# Patient Record
Sex: FEMALE | Employment: FULL TIME | URBAN - METROPOLITAN AREA
[De-identification: names, ages, dates, MRNs, and addresses within clinical notes are randomized per-mention and may not be internally consistent; named-entity substitution may affect disease eponyms.]

---

## 2018-07-23 LAB
HPV_EXT - HISTORICAL: NORMAL
PAP SMEAR - HIM PATIENT REPORTED: ABNORMAL

## 2019-06-25 LAB
ABORH_EXT (HISTORICAL CONVERSION): NORMAL
ANTIBODY_EXT (HISTORICAL CONVERSION): NEGATIVE
HBSAG_EXT (HISTORICAL CONVERSION): NEGATIVE
HGB_EXT (HISTORICAL CONVERSION): 13.4
HIV_EXT: NEGATIVE
PLT_EXT - HISTORICAL: 147
RPR - HISTORICAL: NORMAL
RUBELLA_EXT (HISTORICAL CONVERSION): NORMAL

## 2019-08-05 ENCOUNTER — TRANSFERRED RECORDS (OUTPATIENT)
Dept: HEALTH INFORMATION MANAGEMENT | Facility: CLINIC | Age: 39
End: 2019-08-05

## 2019-08-19 ENCOUNTER — MEDICAL CORRESPONDENCE (OUTPATIENT)
Dept: HEALTH INFORMATION MANAGEMENT | Facility: CLINIC | Age: 39
End: 2019-08-19

## 2019-08-19 ENCOUNTER — COMMUNICATION - HEALTHEAST (OUTPATIENT)
Dept: TELEHEALTH | Facility: CLINIC | Age: 39
End: 2019-08-19

## 2019-08-19 ENCOUNTER — PRENATAL OFFICE VISIT - HEALTHEAST (OUTPATIENT)
Dept: FAMILY MEDICINE | Facility: CLINIC | Age: 39
End: 2019-08-19

## 2019-08-19 DIAGNOSIS — Z23 NEED FOR HEPATITIS B VACCINATION: ICD-10-CM

## 2019-08-19 DIAGNOSIS — L29.9 ITCHING: ICD-10-CM

## 2019-08-19 DIAGNOSIS — O99.119 THROMBOCYTOPENIA AFFECTING PREGNANCY (H): ICD-10-CM

## 2019-08-19 DIAGNOSIS — K75.4 HEPATITIS, AUTOIMMUNE (H): ICD-10-CM

## 2019-08-19 DIAGNOSIS — G43.109 MIGRAINE WITH AURA AND WITHOUT STATUS MIGRAINOSUS, NOT INTRACTABLE: ICD-10-CM

## 2019-08-19 DIAGNOSIS — D69.6 THROMBOCYTOPENIA AFFECTING PREGNANCY (H): ICD-10-CM

## 2019-08-19 DIAGNOSIS — O09.529 SUPERVISION OF HIGH-RISK PREGNANCY OF ELDERLY MULTIGRAVIDA: ICD-10-CM

## 2019-08-19 DIAGNOSIS — Z12.4 CERVICAL CANCER SCREENING: ICD-10-CM

## 2019-08-19 LAB
ALBUMIN SERPL-MCNC: 3 G/DL (ref 3.5–5)
ALP SERPL-CCNC: 162 U/L (ref 45–120)
ALT SERPL W P-5'-P-CCNC: 456 U/L (ref 0–45)
AST SERPL W P-5'-P-CCNC: 448 U/L (ref 0–40)
BILIRUB DIRECT SERPL-MCNC: 0.5 MG/DL
BILIRUB SERPL-MCNC: 1 MG/DL (ref 0–1)
PROT SERPL-MCNC: 9.1 G/DL (ref 6–8)

## 2019-08-19 ASSESSMENT — MIFFLIN-ST. JEOR: SCORE: 1209.75

## 2019-08-20 LAB — BILE AC SERPL-SCNC: 73 UMOL/L (ref 0–10)

## 2019-08-21 ENCOUNTER — COMMUNICATION - HEALTHEAST (OUTPATIENT)
Dept: FAMILY MEDICINE | Facility: CLINIC | Age: 39
End: 2019-08-21

## 2019-08-21 ENCOUNTER — COMMUNICATION - HEALTHEAST (OUTPATIENT)
Dept: MATERNAL FETAL MEDICINE | Facility: HOSPITAL | Age: 39
End: 2019-08-21

## 2019-08-21 ENCOUNTER — TRANSCRIBE ORDERS (OUTPATIENT)
Dept: FAMILY MEDICINE | Facility: CLINIC | Age: 39
End: 2019-08-21

## 2019-08-21 DIAGNOSIS — O26.90 PREGNANCY RELATED CONDITION, ANTEPARTUM: Primary | ICD-10-CM

## 2019-08-23 ENCOUNTER — PRE VISIT (OUTPATIENT)
Dept: MATERNAL FETAL MEDICINE | Facility: CLINIC | Age: 39
End: 2019-08-23

## 2019-09-03 ENCOUNTER — OFFICE VISIT (OUTPATIENT)
Dept: MATERNAL FETAL MEDICINE | Facility: CLINIC | Age: 39
End: 2019-09-03
Attending: OBSTETRICS & GYNECOLOGY
Payer: COMMERCIAL

## 2019-09-03 DIAGNOSIS — O26.90 PREGNANCY RELATED CONDITION, ANTEPARTUM: Primary | ICD-10-CM

## 2019-09-03 DIAGNOSIS — M35.00 SJOGREN'S SYNDROME, WITH UNSPECIFIED ORGAN INVOLVEMENT (H): ICD-10-CM

## 2019-09-03 DIAGNOSIS — K75.4 AUTOIMMUNE HEPATITIS (H): ICD-10-CM

## 2019-09-03 DIAGNOSIS — O09.522 SUPERVISION OF ELDERLY MULTIGRAVIDA IN SECOND TRIMESTER: Primary | ICD-10-CM

## 2019-09-03 DIAGNOSIS — O26.90 PREGNANCY RELATED CONDITION, ANTEPARTUM: ICD-10-CM

## 2019-09-03 PROCEDURE — 96040 ZZH GENETIC COUNSELING, EACH 30 MINUTES: CPT | Mod: ZF | Performed by: GENETIC COUNSELOR, MS

## 2019-09-03 RX ORDER — HYDROXYZINE PAMOATE 50 MG/1
50-100 CAPSULE ORAL 3 TIMES DAILY PRN
Qty: 30 CAPSULE | Refills: 0 | Status: SHIPPED | OUTPATIENT
Start: 2019-09-03 | End: 2023-02-21

## 2019-09-03 NOTE — PROGRESS NOTES
Pt presents to [facility] for assessment and evaluation of her pregnancy due to autoimmune hepatitis. Pt met with [facility]. See separate note. Pt met with Dr. Robison and Dr. Olmos for [facility] consult. See note in epic for today's consult discussion and recommendations. At this time pt to pt will have L2 at 18-20 weeks. Call made to pt PCP by Dr. Olmos. See note. Questions answered. Discharged stable at this time. Macy Bernard RN

## 2019-09-03 NOTE — PROGRESS NOTES
Maternal-Fetal Medicine Consultation    Earnestine Guillen  : 1980  MRN: [id number]    REFERRAL:  Earnestine Guillen is a 38 year old sent by Dr. Mckenna from [facility] for MFM consultation.    HPI:  Earnestine Guillen is a 38 year old  at 15w6d by LMP c/w 9w0d US here for MFM consultation regarding autoimmune hepatitis. She is here today alone.    Earnestine reports she was diagnosed with autoimmune hepatitis about 1 year ago based on biopsy. She had presented with right flank pain and had a kidney infection and was also diagnosed with AI hepatitis at that time. She was started on azathioprine and tolerates the medication without issue.     When she found out she was pregnant her azathioprine was tapered from 50mg daily to every other day. On recheck of labs a few weeks later it was discovered that her LFTs had tushar significantly and she was subsequently restarted on the daily dosing. She notes itching that started around the time of the decrease in her azathioprine. States its all over, more so on the trunk than in her extremities. Not in palms or soles. It is worse at night. She has not taken anything for this. Overall feels like it has improved slightly since azathioprine was increased back to her previous dose.    She has a history of Migraines but no issue with this in pregnancy. Has not taken sumatriptan this pregnancy. Feeling tired and pruritis but otherwise no concerns. No cramping or bleeding.    Of note:   In review of her records after the patient left I found out that she has SSA/SSB antibodies.  She saw a Rheumatologist in 2018 (Dr. Mark Neil).  She has not had follow up with him regarding possible Sjogren's syndrome.     Pregnancy complicated by:  -Autoimmune hepatitis   -Advanced maternal age  -Migraines  -Thrombocytopenia  -Positive SSA/SSB- discovered after patient left the office.   -Hepatitis B nonimmune    Prenatal Care:  Primary OB care this pregnancy has been with   Clarisa from [facility].    Dating:    LMP: 5/15/2019, history of regular cycles  Dating ultrasound: 7/15/19 at 9w0d  Assisted reproduction: No  Assigned EDC: 20 by LMP    Obstetrics History:  G1 -  -  at 38w0d, 6lb female  G2 - -  at 40w0d, 7lb male    Gynecologic History:  - Pap 18- ASCUS with + HPV. Colposcopy done 18, no bx taken, otherwise normal. Deferring to postpartum visit per primary OB provider  - Denies prior cervical surgery or procedures  - History of pyelonephritis and urinary tract infection. Denies history of STI    Past Medical History:  -Autoimmune hepatitis   -Migraine  -Positive EMELIA, SSA, SSB  -Thrombocytopenia  -Pyelonephritis  -PID     Past Surgical History:  No surgeries     Current Medications:  Azathioprine  Sumatriptan- not taking  Zofran    Medications Prior to Pregnancy:  Azathioprine  Zofran    Allergies:  Had rash to Propranolol or methocaramol (given at same time)    Social History:   Occupation: Works full time at [employer]  Denies use of alcohol, drugs or smoking.    Family History:  -Father with diabetes  Denies history of genetic disorders, preeeclampsia, thromboembolic disease, bleeding disorders, mental retardation    Partner History:  New partner with this pregnancy. No genetic conditions that run in the family.     ROS:  10-point ROS negative except as in HPI     PHYSICAL EXAM:    LMP 05/15/2019   Gen: NAD, well appearing  Chest: Non-labored breathing    Prenatal Labs:    Date: 2019  ABO/Rh: O+  ABS: negative   Hgb: 13.4  Plt: 147  TPA: NR  HepBSAg: negative, HepBSAby negative  HIV: negative  Rubella: immune    GC/Chlam: negative  Urine Cx: negative   Bile acids 73 (2019)  AST 55>66>594 (2019)>448 (19)  ALT 64>67>630 (2019)>456 (19)   Alk Phos 66>84>173 (2019) 164 (19)     Genetic Testing:   cfDNA negative, expecting boy    Ultrasounds:    DATE  GA  ASSESSMENT  7/15/2019 9w0d  Dating    ASSESSMENT:  Earnestine Guillen is a 38 year old  at 15w6d by LMP consistent with 8w5d US here for M consultation for autoimmune hepatitis. On chart review patient also noted to have history of SSA+/SSB+. Her pregnancy is also notable for advanced maternal age, thrombocytopenia, and hepatitis B non-immunity.     RECOMMENDATIONS:    #Autoimmune hepatitis: Women with autoimmune hepatitis are at risk for  delivery and flare of autoimmune hepatitis with worsening of disease. Her current flare is most likely secondary to tapering of azathioprine and she is now back on daily dose and improving. Discussed patient's GI provider, BRANDT Trujillo who reports that it can take 1-1.5 months for LFTs normalize. Bile acids are elevated though not obtained fasting. Suspect patient's itching is secondary to current flare and that symptoms will resolve with normalization of LFTs. If she still has pruritis, BA can be rechecked (recommend checking them fasting).  Ursodiol can be started if they are still elevated and patient is symptomatic.        Recommendations:    Q 2 Week OK intervals from 16 to 24 weeks (these will be performed through MFM, alternating with Peds Cardiology).     Continue daily Azathioprine    Would be okay with prednisone taper if needed to normalize LFTs    Continue q2wk liver studies under direction of GI.     Vistaril prescription provided to help with pruritis at night.     Would be okay with initiating ursodiol if patient's symptoms don't continue to improve.    Recommend comprehensive ultrasound at 18-20 weeks for AMA followed by growth ultrasounds at 28 and 34 weeks with immunosuppressive medication    #SSA/SSB positive: Noted on chart review from 2018. Women with SS-A (Ro) or SS-B (La) antibodies are at risk for  lupus, characterized by rash.  Most severe complication is congenital heart block (CHB). The risk of  developing heart block in offspring of women with anti-Ro/SSA antibodies in the absence of a prior birth with  lupus syndrome is approximately 1-2%.   It increases to 15-20% if a previous child was affected.  Prenatal screening programs using the mechanical HI interval have been created to identify heart block early so that it may be treated before it becomes irreversible.  Patient will need serial assessment of the mechanical HI interval to screen for CHB. We also recommend a formal fetal echocardiograms ASAP, 22 weeks and 26 weeks. This was discussed with the patient over the phone as the antibodies were discovered after she left.     Recommendations:    Q 2 week HI intervals from 16 to 26weeks    Fetal echocardiogram at 17, 22 and 26 weeks.     Recommend ASAP follow up with her Rheumatologist Dr. Neil (He may want her to start on Plaquenil). Please tell patient to arrange this follow up.    # Advanced maternal age: Patient had cfDNA which was negative. Patients of advanced age are also at increased risks of pregnancy complications, including miscarriage, preeclampsia, abnormalities with placentation, stillbirth, and  delivery.  These risks increase with increasing maternal age and comorbidities.      Recommendations:    Targeted anatomical ultrasound at 18-20 weeks.  .    Consider induction of labor at 39 weeks, but no later than 40 weeks.    The patient was seen and evaluated with Dr. Robison    Thank you for allowing us to participate in the care of your patient. Please do not hesitate to contact us if you have further questions regarding the management of your patient.     I acted as a scribe for Dr. Ricki Olmos MD  Ob/Gyn PGY-2  September 3, 2019 7:45 PM    Attestation:   The documentation recorded by the scribe accurately reflects the services I personally performed and the decisions made by me.   Debbi Robison, DO  Maternal Fetal Medicine Specialist           The patient was  seen for an established outpatient consultation.  I spent a total of 30 minutes face to face with Earnestine Guillen during today's visit. Over 50% of this time was spent counseling the patient and/or coordinating care regarding autoimmune hepatitis and SSA antibodies (probable Sjogren's syndrome).

## 2019-09-03 NOTE — PROGRESS NOTES
[facility] Fetal Medicine Center  Genetic Counseling Consult    Patient:  Earnestine Guillen YOB: 1980   Date of Service:  19      Earnestine Guillen was seen at the [facility] for genetic consultation as part of her MFM consultation today.  The indication for genetic counseling is advanced maternal age. She was unaccompanied to today's visit.       Impression/Plan:   1. Earnestine had a cell-free fetal DNA test earlier in pregnancy, which was normal.    2. Earnestine completed an MFM consultation with Dr. Debbi Robison today due to her diagnosis of autoimmune hepatitis and recent rise in her LFTs. See MFM consult note for complete discussion.    Pregnancy History:   /Parity:    Age at Delivery: 39 year old  KRISHNA: 2020, by Last Menstrual Period  Gestational Age: 15w6d    Earnestine takes azathioprine for her diagnosis of autoimmune hepatitis.     Earnestine s pregnancy history is significant for:  o 2000: term, vaginal delivery, female  o 2003: term, vaginal delivery, male    Medical History:   Earnestine has autoimmune hepatitis and follows with [facility] for her gastroenterology care. See MFM consult note for complete discussion.       Family History:   A three-generation pedigree was obtained, and is scanned under the  Media  tab.   The reported family history is negative for multiple miscarriages, stillbirths, birth defects, mental retardation, known genetic conditions, and consanguinity.       Risk Assessment for Chromosome Conditions:   We explained that the risk for fetal chromosome abnormalities increases with maternal age. We discussed specific features of common chromosome abnormalities, including Down syndrome, trisomy 13, trisomy 18, and sex chromosome trisomies.      - At age 39 at midtrimester, the risk to have a baby with Down syndrome is 1 in 98.     - At age 39 at midtrimester, the risk to have a baby with any chromosome  abnormality is 1 in 51.       Earnestine had maternal serum screening earlier in pregnancy.     Non-invasive Prenatal Testing (NIPT)    Maternal plasma cell-free DNA testing    Screens for fetal trisomy 21, trisomy 13, trisomy 18, and sex chromosome aneuploidy    First trimester ultrasound with nuchal translucency and nasal bone assessment was not performed in this pregnancy, to our knowledge.    Earnestine had a Prequel screen earlier in pregnancy; we reviewed the results today, which are normal for chromosome 13, chromosome 18 and chromosome 21 (no aneuploidy detected)    Given the accuracy of this test, these results greatly decrease the chance for certain fetal chromosome abnormalities    We discussed the limitations of normal NIPT results    MSAFP (after 15 weeks for open neural tube defect screening) results were not available for our review today.    Testing Options:   We discussed the following options:   Non-invasive Prenatal Testing (NIPT)    Maternal plasma cell-free DNA testing; first trimester ultrasound with nuchal translucency and nasal bone assessment is recommended, when appropriate    Screens for fetal trisomy 21, trisomy 13, trisomy 18, and sex chromosome aneuploidy    Cannot screen for open neural tube defects; maternal serum AFP after 15 weeks is recommended     Comprehensive (Level II) ultrasound: Detailed ultrasound performed between 18-22 weeks gestation to screen for major birth defects and markers for aneuploidy.      We reviewed the benefits and limitations of this testing.  Screening tests provide a risk assessment specific to the pregnancy for certain fetal chromosome abnormalities, but cannot definitively diagnose or exclude a fetal chromosome abnormality.  Follow-up genetic counseling and consideration of diagnostic testing is recommended with any abnormal screening result. Diagnostic tests carry inherent risks- including risk of miscarriage- that require careful consideration.  These tests can  detect fetal chromosome abnormalities with greater than 99% certainty.  Results can be compromised by maternal cell contamination or mosaicism, and are limited by the resolution of cytogenetic G-banding technology.  There is no screening nor diagnostic test that can detect all forms of birth defects or mental disability.    It was a pleasure to be involved with Earnestine s care. Face-to-face time of the meeting was 20 minutes.      Maria A Vega MS, [facility]  Maternal Fetal Medicine  [facility]  Ph: [phone]  [email or web address]

## 2019-09-09 ENCOUNTER — AMBULATORY - HEALTHEAST (OUTPATIENT)
Dept: MATERNAL FETAL MEDICINE | Facility: HOSPITAL | Age: 39
End: 2019-09-09

## 2019-09-09 ENCOUNTER — PRENATAL OFFICE VISIT - HEALTHEAST (OUTPATIENT)
Dept: FAMILY MEDICINE | Facility: CLINIC | Age: 39
End: 2019-09-09

## 2019-09-09 DIAGNOSIS — G43.109 MIGRAINE WITH AURA AND WITHOUT STATUS MIGRAINOSUS, NOT INTRACTABLE: ICD-10-CM

## 2019-09-09 DIAGNOSIS — O99.119 THROMBOCYTOPENIA AFFECTING PREGNANCY (H): ICD-10-CM

## 2019-09-09 DIAGNOSIS — D69.6 THROMBOCYTOPENIA AFFECTING PREGNANCY (H): ICD-10-CM

## 2019-09-09 DIAGNOSIS — O09.529 SUPERVISION OF HIGH-RISK PREGNANCY OF ELDERLY MULTIGRAVIDA: ICD-10-CM

## 2019-09-09 DIAGNOSIS — K75.4 HEPATITIS, AUTOIMMUNE (H): ICD-10-CM

## 2019-09-09 DIAGNOSIS — R79.89 ABNORMAL LFTS: ICD-10-CM

## 2019-09-09 DIAGNOSIS — O26.90 PREGNANCY, ANTEPARTUM, COMPLICATIONS: ICD-10-CM

## 2019-09-09 LAB
ALBUMIN SERPL-MCNC: 2.7 G/DL (ref 3.5–5)
ALP SERPL-CCNC: 127 U/L (ref 45–120)
ALT SERPL W P-5'-P-CCNC: 237 U/L (ref 0–45)
ANION GAP SERPL CALCULATED.3IONS-SCNC: 8 MMOL/L (ref 5–18)
AST SERPL W P-5'-P-CCNC: 251 U/L (ref 0–40)
BASOPHILS # BLD AUTO: 0 THOU/UL (ref 0–0.2)
BASOPHILS NFR BLD AUTO: 0 % (ref 0–2)
BILIRUB SERPL-MCNC: 0.8 MG/DL (ref 0–1)
BUN SERPL-MCNC: 8 MG/DL (ref 8–22)
CALCIUM SERPL-MCNC: 8.6 MG/DL (ref 8.5–10.5)
CHLORIDE BLD-SCNC: 107 MMOL/L (ref 98–107)
CO2 SERPL-SCNC: 20 MMOL/L (ref 22–31)
CREAT SERPL-MCNC: 0.59 MG/DL (ref 0.6–1.1)
EOSINOPHIL # BLD AUTO: 0.1 THOU/UL (ref 0–0.4)
EOSINOPHIL NFR BLD AUTO: 2 % (ref 0–6)
ERYTHROCYTE [DISTWIDTH] IN BLOOD BY AUTOMATED COUNT: 13.2 % (ref 11–14.5)
GFR SERPL CREATININE-BSD FRML MDRD: >60 ML/MIN/1.73M2
GLUCOSE BLD-MCNC: 149 MG/DL (ref 70–125)
HCT VFR BLD AUTO: 38.4 % (ref 35–47)
HGB BLD-MCNC: 12.5 G/DL (ref 12–16)
LYMPHOCYTES # BLD AUTO: 0.9 THOU/UL (ref 0.8–4.4)
LYMPHOCYTES NFR BLD AUTO: 19 % (ref 20–40)
MCH RBC QN AUTO: 29.1 PG (ref 27–34)
MCHC RBC AUTO-ENTMCNC: 32.7 G/DL (ref 32–36)
MCV RBC AUTO: 89 FL (ref 80–100)
MONOCYTES # BLD AUTO: 0.3 THOU/UL (ref 0–0.9)
MONOCYTES NFR BLD AUTO: 6 % (ref 2–10)
NEUTROPHILS # BLD AUTO: 3.4 THOU/UL (ref 2–7.7)
NEUTROPHILS NFR BLD AUTO: 73 % (ref 50–70)
PLATELET # BLD AUTO: 149 THOU/UL (ref 140–440)
PMV BLD AUTO: 9.1 FL (ref 7–10)
POTASSIUM BLD-SCNC: 3.6 MMOL/L (ref 3.5–5)
PROT SERPL-MCNC: 8.1 G/DL (ref 6–8)
RBC # BLD AUTO: 4.3 MILL/UL (ref 3.8–5.4)
SODIUM SERPL-SCNC: 135 MMOL/L (ref 136–145)
WBC: 4.6 THOU/UL (ref 4–11)

## 2019-09-10 ENCOUNTER — COMMUNICATION - HEALTHEAST (OUTPATIENT)
Dept: SCHEDULING | Facility: CLINIC | Age: 39
End: 2019-09-10

## 2019-09-10 ENCOUNTER — COMMUNICATION - HEALTHEAST (OUTPATIENT)
Dept: FAMILY MEDICINE | Facility: CLINIC | Age: 39
End: 2019-09-10

## 2019-09-11 ENCOUNTER — COMMUNICATION - HEALTHEAST (OUTPATIENT)
Dept: FAMILY MEDICINE | Facility: CLINIC | Age: 39
End: 2019-09-11

## 2019-09-11 ENCOUNTER — COMMUNICATION - HEALTHEAST (OUTPATIENT)
Dept: ADMINISTRATIVE | Facility: CLINIC | Age: 39
End: 2019-09-11

## 2019-09-11 DIAGNOSIS — K75.4 HEPATITIS, AUTOIMMUNE (H): ICD-10-CM

## 2019-09-11 DIAGNOSIS — O09.529 SUPERVISION OF HIGH-RISK PREGNANCY OF ELDERLY MULTIGRAVIDA: ICD-10-CM

## 2019-09-11 DIAGNOSIS — R76.8 SS-B ANTIBODY POSITIVE: ICD-10-CM

## 2019-09-11 DIAGNOSIS — R76.8 SS-A ANTIBODY POSITIVE: ICD-10-CM

## 2019-09-11 LAB — BILE AC SERPL-SCNC: 39 UMOL/L (ref 0–10)

## 2019-09-17 ENCOUNTER — HOSPITAL ENCOUNTER (OUTPATIENT)
Dept: CARDIOLOGY | Facility: CLINIC | Age: 39
Discharge: HOME | End: 2019-09-17
Attending: OBSTETRICS & GYNECOLOGY | Admitting: OBSTETRICS & GYNECOLOGY
Payer: COMMERCIAL

## 2019-09-17 DIAGNOSIS — R76.8 SS-A ANTIBODY POSITIVE: ICD-10-CM

## 2019-09-17 DIAGNOSIS — O09.90 HIGH-RISK PREGNANCY, UNSPECIFIED TRIMESTER: ICD-10-CM

## 2019-09-17 PROCEDURE — 76825 ECHO EXAM OF FETAL HEART: CPT

## 2019-09-23 ENCOUNTER — OFFICE VISIT - HEALTHEAST (OUTPATIENT)
Dept: MATERNAL FETAL MEDICINE | Facility: HOSPITAL | Age: 39
End: 2019-09-23

## 2019-09-23 ENCOUNTER — RECORDS - HEALTHEAST (OUTPATIENT)
Dept: ADMINISTRATIVE | Facility: OTHER | Age: 39
End: 2019-09-23

## 2019-09-23 ENCOUNTER — RECORDS - HEALTHEAST (OUTPATIENT)
Dept: ULTRASOUND IMAGING | Facility: HOSPITAL | Age: 39
End: 2019-09-23

## 2019-09-23 DIAGNOSIS — O26.90 PREGNANCY RELATED CONDITIONS, UNSPECIFIED, UNSPECIFIED TRIMESTER: ICD-10-CM

## 2019-09-23 DIAGNOSIS — O26.612 LIVER DISORDER DURING PREGNANCY IN SECOND TRIMESTER: ICD-10-CM

## 2019-09-30 ENCOUNTER — PRENATAL OFFICE VISIT - HEALTHEAST (OUTPATIENT)
Dept: FAMILY MEDICINE | Facility: CLINIC | Age: 39
End: 2019-09-30

## 2019-09-30 DIAGNOSIS — O09.529 SUPERVISION OF HIGH-RISK PREGNANCY OF ELDERLY MULTIGRAVIDA: ICD-10-CM

## 2019-09-30 DIAGNOSIS — R76.8 SS-A ANTIBODY POSITIVE: ICD-10-CM

## 2019-09-30 DIAGNOSIS — D69.6 THROMBOCYTOPENIA AFFECTING PREGNANCY (H): ICD-10-CM

## 2019-09-30 DIAGNOSIS — K75.4 HEPATITIS, AUTOIMMUNE (H): ICD-10-CM

## 2019-09-30 DIAGNOSIS — O44.02 PLACENTA PREVIA IN SECOND TRIMESTER: ICD-10-CM

## 2019-09-30 DIAGNOSIS — O99.119 THROMBOCYTOPENIA AFFECTING PREGNANCY (H): ICD-10-CM

## 2019-09-30 DIAGNOSIS — M35.00 SJOGREN'S SYNDROME, WITH UNSPECIFIED ORGAN INVOLVEMENT (H): ICD-10-CM

## 2019-09-30 DIAGNOSIS — R79.89 ABNORMAL LFTS: ICD-10-CM

## 2019-10-07 ENCOUNTER — RECORDS - HEALTHEAST (OUTPATIENT)
Dept: ADMINISTRATIVE | Facility: OTHER | Age: 39
End: 2019-10-07

## 2019-10-07 ENCOUNTER — RECORDS - HEALTHEAST (OUTPATIENT)
Dept: ULTRASOUND IMAGING | Facility: HOSPITAL | Age: 39
End: 2019-10-07

## 2019-10-07 ENCOUNTER — OFFICE VISIT - HEALTHEAST (OUTPATIENT)
Dept: MATERNAL FETAL MEDICINE | Facility: HOSPITAL | Age: 39
End: 2019-10-07

## 2019-10-07 DIAGNOSIS — O26.90 PREGNANCY RELATED CONDITIONS, UNSPECIFIED, UNSPECIFIED TRIMESTER: ICD-10-CM

## 2019-10-07 DIAGNOSIS — O36.1920 MATERNAL ATYPICAL ANTIBODY AFFECTING PREGNANCY IN SECOND TRIMESTER, SINGLE OR UNSPECIFIED FETUS: ICD-10-CM

## 2019-10-22 ENCOUNTER — HOSPITAL ENCOUNTER (OUTPATIENT)
Dept: CARDIOLOGY | Facility: CLINIC | Age: 39
Discharge: HOME | End: 2019-10-22
Attending: OBSTETRICS & GYNECOLOGY | Admitting: OBSTETRICS & GYNECOLOGY
Payer: COMMERCIAL

## 2019-10-22 DIAGNOSIS — O09.90 HIGH-RISK PREGNANCY, UNSPECIFIED TRIMESTER: ICD-10-CM

## 2019-10-22 DIAGNOSIS — R76.8 SS-A ANTIBODY POSITIVE: ICD-10-CM

## 2019-10-22 PROCEDURE — 76825 ECHO EXAM OF FETAL HEART: CPT

## 2019-10-22 NOTE — PROGRESS NOTES
Fetal Cardiology Consultation    Patient:  Earnestine Guillen MRN:  [id number]   YOB: 1980 Age:  38 year old   Date of Visit:  10/22/2019 PCP:  Rebecca Mckenna MD   KRISHNA: 2/19/2020, by Last Menstrual Period EGA: 22w6d weeks     Dear Dr. Robison:    I had the pleasure of seeing Earnestine Guillen at the [facility] Fetal [facility]chocardiography Laboratory in [place] on 10/22/2019 in ongoing consultation for fetal echocardiography results. She presented today by herself. As you know, she is a 38 year old female with SSA/SSB antibody positivity.    The fetal echocardiogram was normal. Normal fetal heart 1:1 atrioventricular rhythm with normal mechanical NE interval. Normal fetal cardiac anatomy. Normal right and left ventricular size and function without hypertrophy. No evidence of diastolic dysfunction. No pericardial effusion.     I reviewed and interpreted the fetal echocardiogram today. I discussed the normal results with Ms. Guillen. While these results are normal, it is important to note that fetal echocardiography cannot exclude small atrial or ventricular septal defects, persistent ductus arteriosus, mild coarctation of the aorta, partial anomalous pulmonary venous return, minor anatomic valve anomalies, or coronary artery anomalies.     Thank you for allowing me to participate in Ms. Guillen's care. Please don't hesitate to contact me or the Fetal Cardiology team at [facility] with any questions or concerns.     I spent a total of 5 minutes face-to-face with Ms. Guillen during today's office visit. Over 50% of this time was spent counseling the patient and/or coordinating care regarding the fetal echocardiography results.     Zion Bender MD  Pediatric Cardiology  [facility]  Phone [phone]

## 2019-10-23 ENCOUNTER — COMMUNICATION - HEALTHEAST (OUTPATIENT)
Dept: FAMILY MEDICINE | Facility: CLINIC | Age: 39
End: 2019-10-23

## 2019-11-04 ENCOUNTER — RECORDS - HEALTHEAST (OUTPATIENT)
Dept: ULTRASOUND IMAGING | Facility: HOSPITAL | Age: 39
End: 2019-11-04

## 2019-11-04 ENCOUNTER — RECORDS - HEALTHEAST (OUTPATIENT)
Dept: ADMINISTRATIVE | Facility: OTHER | Age: 39
End: 2019-11-04

## 2019-11-04 ENCOUNTER — OFFICE VISIT - HEALTHEAST (OUTPATIENT)
Dept: MATERNAL FETAL MEDICINE | Facility: HOSPITAL | Age: 39
End: 2019-11-04

## 2019-11-04 DIAGNOSIS — O44.02 PLACENTA PREVIA IN SECOND TRIMESTER: ICD-10-CM

## 2019-11-04 DIAGNOSIS — O26.90 PREGNANCY RELATED CONDITIONS, UNSPECIFIED, UNSPECIFIED TRIMESTER: ICD-10-CM

## 2019-11-04 DIAGNOSIS — R76.8 SS-A ANTIBODY POSITIVE: ICD-10-CM

## 2019-11-06 ENCOUNTER — PRENATAL OFFICE VISIT - HEALTHEAST (OUTPATIENT)
Dept: FAMILY MEDICINE | Facility: CLINIC | Age: 39
End: 2019-11-06

## 2019-11-06 DIAGNOSIS — O44.02 PLACENTA PREVIA IN SECOND TRIMESTER: ICD-10-CM

## 2019-11-06 DIAGNOSIS — N30.00 ACUTE CYSTITIS WITHOUT HEMATURIA: ICD-10-CM

## 2019-11-06 DIAGNOSIS — M35.01 SJOGREN'S SYNDROME WITH KERATOCONJUNCTIVITIS SICCA (H): ICD-10-CM

## 2019-11-06 DIAGNOSIS — O09.529 SUPERVISION OF HIGH-RISK PREGNANCY OF ELDERLY MULTIGRAVIDA: ICD-10-CM

## 2019-11-06 DIAGNOSIS — K75.4 HEPATITIS, AUTOIMMUNE (H): ICD-10-CM

## 2019-11-06 DIAGNOSIS — O99.810 IMPAIRED GLUCOSE IN PREGNANCY, ANTEPARTUM: ICD-10-CM

## 2019-11-06 DIAGNOSIS — R73.09 ABNORMAL GLUCOSE TOLERANCE TEST: ICD-10-CM

## 2019-11-06 LAB
ALBUMIN UR-MCNC: NEGATIVE MG/DL
APPEARANCE UR: ABNORMAL
BACTERIA #/AREA URNS HPF: ABNORMAL HPF
BILIRUB UR QL STRIP: NEGATIVE
COLOR UR AUTO: ABNORMAL
FASTING STATUS PATIENT QL REPORTED: ABNORMAL
GLUCOSE 1H P 50 G GLC PO SERPL-MCNC: 153 MG/DL (ref 70–139)
GLUCOSE UR STRIP-MCNC: NEGATIVE MG/DL
HGB UR QL STRIP: ABNORMAL
KETONES UR STRIP-MCNC: NEGATIVE MG/DL
LEUKOCYTE ESTERASE UR QL STRIP: ABNORMAL
NITRATE UR QL: NEGATIVE
PH UR STRIP: 6 [PH] (ref 5–8)
RBC #/AREA URNS AUTO: ABNORMAL HPF
SP GR UR STRIP: >=1.03 (ref 1–1.03)
SQUAMOUS #/AREA URNS AUTO: ABNORMAL LPF
UROBILINOGEN UR STRIP-ACNC: ABNORMAL
WBC #/AREA URNS AUTO: ABNORMAL HPF

## 2019-11-07 LAB — BACTERIA SPEC CULT: NORMAL

## 2019-11-11 ENCOUNTER — COMMUNICATION - HEALTHEAST (OUTPATIENT)
Dept: FAMILY MEDICINE | Facility: CLINIC | Age: 39
End: 2019-11-11

## 2019-11-15 ENCOUNTER — AMBULATORY - HEALTHEAST (OUTPATIENT)
Dept: LAB | Facility: CLINIC | Age: 39
End: 2019-11-15

## 2019-11-15 DIAGNOSIS — O99.810 IMPAIRED GLUCOSE IN PREGNANCY, ANTEPARTUM: ICD-10-CM

## 2019-11-15 DIAGNOSIS — R73.09 ABNORMAL GLUCOSE TOLERANCE TEST: ICD-10-CM

## 2019-11-15 DIAGNOSIS — O24.410 DIET CONTROLLED GESTATIONAL DIABETES MELLITUS (GDM) IN THIRD TRIMESTER: ICD-10-CM

## 2019-11-15 LAB
FASTING STATUS PATIENT QL REPORTED: YES
GLUCOSE 1H P 100 G GLC PO SERPL-MCNC: 217 MG/DL
GLUCOSE 2H P 100 G GLC PO SERPL-MCNC: 198 MG/DL
GLUCOSE P FAST SERPL-MCNC: 85 MG/DL

## 2019-11-20 ENCOUNTER — HOSPITAL ENCOUNTER (OUTPATIENT)
Dept: CARDIOLOGY | Facility: CLINIC | Age: 39
Discharge: HOME | End: 2019-11-20
Attending: OBSTETRICS & GYNECOLOGY | Admitting: OBSTETRICS & GYNECOLOGY
Payer: COMMERCIAL

## 2019-11-20 DIAGNOSIS — R76.8 SS-A ANTIBODY POSITIVE: ICD-10-CM

## 2019-11-20 DIAGNOSIS — O09.90 HIGH-RISK PREGNANCY, UNSPECIFIED TRIMESTER: ICD-10-CM

## 2019-11-20 PROCEDURE — 76825 ECHO EXAM OF FETAL HEART: CPT

## 2019-11-20 NOTE — PROGRESS NOTES
Fetal Cardiology Consultation    Patient:  Earnestine Guillen MRN:  [id number]   YOB: 1980 Age:  38 year old   Date of Visit:  11/20/2019 PCP:  Rebecca Mckenna MD   KRISHNA: 2/19/2020, by Last Menstrual Period EGA: 27w0d weeks     Dear Dr. Mckenna:    I had the pleasure of seeing Earnestine Guillen at the [facility] Fetal Echocardiography Laboratory in [place] on 11/20/2019 in ongoing consultation for fetal echocardiography results. She presented today by herself. As you know, she is a 38 year old female with SSA-antibody positivity.    The fetal echocardiogram was normal. Normal fetal heart 1:1 atrioventricular rhythm with normal mechanical FL interval. Normal fetal cardiac anatomy. Normal right and left ventricular size and function without hypertrophy. No evidence of diastolic dysfunction. No pericardial effusion.     I reviewed and interpreted the fetal echocardiogram today. I discussed the normal results with Ms. Guillen. While these results are normal, it is important to note that fetal echocardiography cannot exclude small atrial or ventricular septal defects, persistent ductus arteriosus, mild coarctation of the aorta, partial anomalous pulmonary venous return, minor anatomic valve anomalies, or coronary artery anomalies.     Thank you for allowing me to participate in Ms. Guillen's care. Please don't hesitate to contact me or the Fetal Cardiology team at [facility] with any questions or concerns.     I spent a total of 10 minutes face-to-face with Ms. Guillen during today's office visit. Over 50% of this time was spent counseling the patient and/or coordinating care regarding the fetal echocardiography results.     Zion Bender MD  Pediatric Cardiology  [facility]  Phone [phone]

## 2019-12-04 ENCOUNTER — RECORDS - HEALTHEAST (OUTPATIENT)
Dept: ADMINISTRATIVE | Facility: OTHER | Age: 39
End: 2019-12-04

## 2019-12-04 ENCOUNTER — RECORDS - HEALTHEAST (OUTPATIENT)
Dept: ULTRASOUND IMAGING | Facility: HOSPITAL | Age: 39
End: 2019-12-04

## 2019-12-04 ENCOUNTER — OFFICE VISIT - HEALTHEAST (OUTPATIENT)
Dept: MATERNAL FETAL MEDICINE | Facility: HOSPITAL | Age: 39
End: 2019-12-04

## 2019-12-04 DIAGNOSIS — O09.529 SUPERVISION OF HIGH-RISK PREGNANCY OF ELDERLY MULTIGRAVIDA: ICD-10-CM

## 2019-12-04 DIAGNOSIS — R76.8 OTHER SPECIFIED ABNORMAL IMMUNOLOGICAL FINDINGS IN SERUM: ICD-10-CM

## 2019-12-04 DIAGNOSIS — K75.4 HEPATITIS, AUTOIMMUNE (H): ICD-10-CM

## 2019-12-04 DIAGNOSIS — O44.02 COMPLETE PLACENTA PREVIA NOS OR WITHOUT HEMORRHAGE, SECOND TRIMESTER: ICD-10-CM

## 2019-12-04 DIAGNOSIS — R76.8 SS-A ANTIBODY POSITIVE: ICD-10-CM

## 2019-12-06 ENCOUNTER — AMBULATORY - HEALTHEAST (OUTPATIENT)
Dept: EDUCATION SERVICES | Facility: CLINIC | Age: 39
End: 2019-12-06

## 2019-12-06 ENCOUNTER — PRENATAL OFFICE VISIT - HEALTHEAST (OUTPATIENT)
Dept: FAMILY MEDICINE | Facility: CLINIC | Age: 39
End: 2019-12-06

## 2019-12-06 DIAGNOSIS — O44.02 PLACENTA PREVIA IN SECOND TRIMESTER: ICD-10-CM

## 2019-12-06 DIAGNOSIS — K75.4 HEPATITIS, AUTOIMMUNE (H): ICD-10-CM

## 2019-12-06 DIAGNOSIS — O24.410 DIET CONTROLLED GESTATIONAL DIABETES MELLITUS (GDM), ANTEPARTUM: ICD-10-CM

## 2019-12-06 DIAGNOSIS — O24.410 DIET CONTROLLED GESTATIONAL DIABETES MELLITUS (GDM) IN SECOND TRIMESTER: ICD-10-CM

## 2019-12-06 DIAGNOSIS — O09.529 SUPERVISION OF HIGH-RISK PREGNANCY OF ELDERLY MULTIGRAVIDA: ICD-10-CM

## 2019-12-06 DIAGNOSIS — R76.8 SS-A ANTIBODY POSITIVE: ICD-10-CM

## 2019-12-06 DIAGNOSIS — M35.01 SJOGREN'S SYNDROME WITH KERATOCONJUNCTIVITIS SICCA (H): ICD-10-CM

## 2019-12-06 LAB
ALBUMIN SERPL-MCNC: 2.6 G/DL (ref 3.5–5)
ALBUMIN UR-MCNC: NEGATIVE MG/DL
ALP SERPL-CCNC: 344 U/L (ref 45–120)
ALT SERPL W P-5'-P-CCNC: 39 U/L (ref 0–45)
APPEARANCE UR: CLEAR
AST SERPL W P-5'-P-CCNC: 41 U/L (ref 0–40)
BACTERIA #/AREA URNS HPF: ABNORMAL HPF
BILIRUB DIRECT SERPL-MCNC: 0.2 MG/DL
BILIRUB SERPL-MCNC: 0.5 MG/DL (ref 0–1)
BILIRUB UR QL STRIP: NEGATIVE
COLOR UR AUTO: YELLOW
ERYTHROCYTE [DISTWIDTH] IN BLOOD BY AUTOMATED COUNT: 12.8 % (ref 11–14.5)
GLUCOSE UR STRIP-MCNC: NEGATIVE MG/DL
HCT VFR BLD AUTO: 36.9 % (ref 35–47)
HGB BLD-MCNC: 12.3 G/DL (ref 12–16)
HGB UR QL STRIP: ABNORMAL
KETONES UR STRIP-MCNC: ABNORMAL MG/DL
LEUKOCYTE ESTERASE UR QL STRIP: ABNORMAL
MCH RBC QN AUTO: 29 PG (ref 27–34)
MCHC RBC AUTO-ENTMCNC: 33.4 G/DL (ref 32–36)
MCV RBC AUTO: 87 FL (ref 80–100)
MUCOUS THREADS #/AREA URNS LPF: ABNORMAL LPF
NITRATE UR QL: NEGATIVE
PH UR STRIP: 6 [PH] (ref 5–8)
PLATELET # BLD AUTO: 161 THOU/UL (ref 140–440)
PMV BLD AUTO: 8.8 FL (ref 7–10)
PROT SERPL-MCNC: 7.8 G/DL (ref 6–8)
RBC # BLD AUTO: 4.24 MILL/UL (ref 3.8–5.4)
RBC #/AREA URNS AUTO: ABNORMAL HPF
SP GR UR STRIP: >=1.03 (ref 1–1.03)
SQUAMOUS #/AREA URNS AUTO: ABNORMAL LPF
UROBILINOGEN UR STRIP-ACNC: ABNORMAL
WBC #/AREA URNS AUTO: ABNORMAL HPF
WBC: 6.8 THOU/UL (ref 4–11)

## 2019-12-07 LAB
BACTERIA SPEC CULT: NO GROWTH
T PALLIDUM AB SER QL: NEGATIVE

## 2019-12-09 ENCOUNTER — RECORDS - HEALTHEAST (OUTPATIENT)
Dept: FAMILY MEDICINE | Facility: CLINIC | Age: 39
End: 2019-12-09

## 2019-12-18 ENCOUNTER — AMBULATORY - HEALTHEAST (OUTPATIENT)
Dept: EDUCATION SERVICES | Facility: CLINIC | Age: 39
End: 2019-12-18

## 2019-12-18 DIAGNOSIS — O24.410 DIET CONTROLLED GESTATIONAL DIABETES MELLITUS (GDM), ANTEPARTUM: ICD-10-CM

## 2019-12-20 ENCOUNTER — PRENATAL OFFICE VISIT - HEALTHEAST (OUTPATIENT)
Dept: FAMILY MEDICINE | Facility: CLINIC | Age: 39
End: 2019-12-20

## 2019-12-20 DIAGNOSIS — M35.01 SJOGREN'S SYNDROME WITH KERATOCONJUNCTIVITIS SICCA (H): ICD-10-CM

## 2019-12-20 DIAGNOSIS — N89.8 VAGINAL DISCHARGE: ICD-10-CM

## 2019-12-20 DIAGNOSIS — B96.89 BACTERIAL VAGINOSIS: ICD-10-CM

## 2019-12-20 DIAGNOSIS — N76.0 BACTERIAL VAGINOSIS: ICD-10-CM

## 2019-12-20 DIAGNOSIS — O44.02 PLACENTA PREVIA IN SECOND TRIMESTER: ICD-10-CM

## 2019-12-20 DIAGNOSIS — K75.4 HEPATITIS, AUTOIMMUNE (H): ICD-10-CM

## 2019-12-20 DIAGNOSIS — R76.8 SS-A ANTIBODY POSITIVE: ICD-10-CM

## 2019-12-20 DIAGNOSIS — O09.529 SUPERVISION OF HIGH-RISK PREGNANCY OF ELDERLY MULTIGRAVIDA: ICD-10-CM

## 2019-12-20 DIAGNOSIS — O24.410 DIET CONTROLLED GESTATIONAL DIABETES MELLITUS (GDM) IN SECOND TRIMESTER: ICD-10-CM

## 2019-12-20 LAB
CLUE CELLS: ABNORMAL
TRICHOMONAS, WET PREP: ABNORMAL
YEAST, WET PREP: ABNORMAL

## 2019-12-30 ENCOUNTER — PRENATAL OFFICE VISIT - HEALTHEAST (OUTPATIENT)
Dept: FAMILY MEDICINE | Facility: CLINIC | Age: 39
End: 2019-12-30

## 2019-12-30 DIAGNOSIS — O24.410 DIET CONTROLLED GESTATIONAL DIABETES MELLITUS (GDM) IN SECOND TRIMESTER: ICD-10-CM

## 2019-12-30 DIAGNOSIS — O44.02 PLACENTA PREVIA IN SECOND TRIMESTER: ICD-10-CM

## 2019-12-30 DIAGNOSIS — R76.8 SS-A ANTIBODY POSITIVE: ICD-10-CM

## 2019-12-30 DIAGNOSIS — K75.4 HEPATITIS, AUTOIMMUNE (H): ICD-10-CM

## 2019-12-30 DIAGNOSIS — O09.529 SUPERVISION OF HIGH-RISK PREGNANCY OF ELDERLY MULTIGRAVIDA: ICD-10-CM

## 2020-01-07 ENCOUNTER — COMMUNICATION - HEALTHEAST (OUTPATIENT)
Dept: FAMILY MEDICINE | Facility: CLINIC | Age: 40
End: 2020-01-07

## 2020-01-07 DIAGNOSIS — O24.410 DIET CONTROLLED GESTATIONAL DIABETES MELLITUS (GDM) IN SECOND TRIMESTER: ICD-10-CM

## 2020-01-08 ENCOUNTER — RECORDS - HEALTHEAST (OUTPATIENT)
Dept: ADMINISTRATIVE | Facility: OTHER | Age: 40
End: 2020-01-08

## 2020-01-08 ENCOUNTER — RECORDS - HEALTHEAST (OUTPATIENT)
Dept: ULTRASOUND IMAGING | Facility: HOSPITAL | Age: 40
End: 2020-01-08

## 2020-01-08 ENCOUNTER — OFFICE VISIT - HEALTHEAST (OUTPATIENT)
Dept: MATERNAL FETAL MEDICINE | Facility: HOSPITAL | Age: 40
End: 2020-01-08

## 2020-01-08 DIAGNOSIS — O24.410 DIET CONTROLLED GESTATIONAL DIABETES MELLITUS (GDM), ANTEPARTUM: ICD-10-CM

## 2020-01-08 DIAGNOSIS — R76.8 SS-A ANTIBODY POSITIVE: ICD-10-CM

## 2020-01-08 DIAGNOSIS — K75.4 HEPATITIS, AUTOIMMUNE (H): ICD-10-CM

## 2020-01-08 DIAGNOSIS — R76.8 OTHER SPECIFIED ABNORMAL IMMUNOLOGICAL FINDINGS IN SERUM: ICD-10-CM

## 2020-01-08 DIAGNOSIS — K75.4 AUTOIMMUNE HEPATITIS (H): ICD-10-CM

## 2020-01-10 ENCOUNTER — COMMUNICATION - HEALTHEAST (OUTPATIENT)
Dept: OBGYN | Facility: CLINIC | Age: 40
End: 2020-01-10

## 2020-01-10 ENCOUNTER — RECORDS - HEALTHEAST (OUTPATIENT)
Dept: ADMINISTRATIVE | Facility: OTHER | Age: 40
End: 2020-01-10

## 2020-01-10 ENCOUNTER — COMMUNICATION - HEALTHEAST (OUTPATIENT)
Dept: ADMINISTRATIVE | Facility: CLINIC | Age: 40
End: 2020-01-10

## 2020-01-13 ENCOUNTER — COMMUNICATION - HEALTHEAST (OUTPATIENT)
Dept: MATERNAL FETAL MEDICINE | Facility: HOSPITAL | Age: 40
End: 2020-01-13

## 2020-01-14 ENCOUNTER — RECORDS - HEALTHEAST (OUTPATIENT)
Dept: ADMINISTRATIVE | Facility: OTHER | Age: 40
End: 2020-01-14

## 2020-01-14 ENCOUNTER — COMMUNICATION - HEALTHEAST (OUTPATIENT)
Dept: SCHEDULING | Facility: CLINIC | Age: 40
End: 2020-01-14

## 2020-01-14 ENCOUNTER — COMMUNICATION - HEALTHEAST (OUTPATIENT)
Dept: FAMILY MEDICINE | Facility: CLINIC | Age: 40
End: 2020-01-14

## 2020-01-15 ENCOUNTER — OFFICE VISIT - HEALTHEAST (OUTPATIENT)
Dept: FAMILY MEDICINE | Facility: CLINIC | Age: 40
End: 2020-01-15

## 2020-01-15 DIAGNOSIS — M79.89 LEG SWELLING: ICD-10-CM

## 2020-01-15 LAB
ALBUMIN SERPL-MCNC: 2.6 G/DL (ref 3.5–5)
ALP SERPL-CCNC: 269 U/L (ref 45–120)
ALT SERPL W P-5'-P-CCNC: 25 U/L (ref 0–45)
ANION GAP SERPL CALCULATED.3IONS-SCNC: 7 MMOL/L (ref 5–18)
APTT PPP: 26 SECONDS (ref 24–37)
AST SERPL W P-5'-P-CCNC: 28 U/L (ref 0–40)
BILIRUB SERPL-MCNC: 0.5 MG/DL (ref 0–1)
BUN SERPL-MCNC: 14 MG/DL (ref 8–22)
CALCIUM SERPL-MCNC: 8.5 MG/DL (ref 8.5–10.5)
CHLORIDE BLD-SCNC: 108 MMOL/L (ref 98–107)
CO2 SERPL-SCNC: 24 MMOL/L (ref 22–31)
CREAT SERPL-MCNC: 0.57 MG/DL (ref 0.6–1.1)
CREAT UR-MCNC: 72.9 MG/DL
ERYTHROCYTE [DISTWIDTH] IN BLOOD BY AUTOMATED COUNT: 12.8 % (ref 11–14.5)
GFR SERPL CREATININE-BSD FRML MDRD: >60 ML/MIN/1.73M2
GLUCOSE BLD-MCNC: 75 MG/DL (ref 70–125)
HCT VFR BLD AUTO: 35.5 % (ref 35–47)
HGB BLD-MCNC: 12 G/DL (ref 12–16)
INR PPP: 1 (ref 0.9–1.1)
MCH RBC QN AUTO: 28.8 PG (ref 27–34)
MCHC RBC AUTO-ENTMCNC: 33.8 G/DL (ref 32–36)
MCV RBC AUTO: 85 FL (ref 80–100)
PLATELET # BLD AUTO: 186 THOU/UL (ref 140–440)
PMV BLD AUTO: 9.1 FL (ref 7–10)
POTASSIUM BLD-SCNC: 3.7 MMOL/L (ref 3.5–5)
PROT SERPL-MCNC: 6.9 G/DL (ref 6–8)
PROTEIN, RANDOM URINE - HISTORICAL: 11 MG/DL
PROTEIN/CREAT RATIO, RANDOM UR: 0.15
RBC # BLD AUTO: 4.17 MILL/UL (ref 3.8–5.4)
SODIUM SERPL-SCNC: 139 MMOL/L (ref 136–145)
URATE SERPL-MCNC: 4.7 MG/DL (ref 2–7.5)
WBC: 5.6 THOU/UL (ref 4–11)

## 2020-01-15 ASSESSMENT — MIFFLIN-ST. JEOR: SCORE: 1247.86

## 2020-01-22 ENCOUNTER — RECORDS - HEALTHEAST (OUTPATIENT)
Dept: ADMINISTRATIVE | Facility: OTHER | Age: 40
End: 2020-01-22

## 2020-01-22 ENCOUNTER — HOME CARE/HOSPICE - HEALTHEAST (OUTPATIENT)
Dept: HOME HEALTH SERVICES | Facility: HOME HEALTH | Age: 40
End: 2020-01-22

## 2020-02-28 ENCOUNTER — COMMUNICATION - HEALTHEAST (OUTPATIENT)
Dept: FAMILY MEDICINE | Facility: CLINIC | Age: 40
End: 2020-02-28

## 2020-03-16 ENCOUNTER — COMMUNICATION - HEALTHEAST (OUTPATIENT)
Dept: FAMILY MEDICINE | Facility: CLINIC | Age: 40
End: 2020-03-16

## 2020-03-18 ENCOUNTER — COMMUNICATION - HEALTHEAST (OUTPATIENT)
Dept: FAMILY MEDICINE | Facility: CLINIC | Age: 40
End: 2020-03-18

## 2021-05-30 ENCOUNTER — RECORDS - HEALTHEAST (OUTPATIENT)
Dept: ADMINISTRATIVE | Facility: CLINIC | Age: 41
End: 2021-05-30

## 2021-05-31 NOTE — TELEPHONE ENCOUNTER
Patient will be called and scheduled for GC and consult at [facility] location.      EDMUNDO Ayala .

## 2021-05-31 NOTE — TELEPHONE ENCOUNTER
"Patient Returning Call  Reason for call:  Message from clinic  Information relayed to patient:  Message from Rebecca Mckenna MD sent at 8/21/2019 12:27 PM CDT -----  Called pt, left VM to call back. Would like the following information relayed to pt:      \"Your liver function tests are still high in the 450s, but lower than the 600s as they were on 8/5.  Bile acids were high at 73.  I spoke with your GI provider Shirley Tam who recommends we continue your azathioprine as she suspects your itching symptoms are most likely due to the fact that you were tapering down on the dose previously.  She recommends we give you a little more time to see how this improves your symptoms before considering different medication.  You can use Benadryl 25-50mg every 8 hours in the meantime for the itching which may provide some relief.  We should recheck your bile acids and liver function tests at your next OB visit in about 2 weeks.  I have also discussed this with the [facility] specialty doctor (maternal-fetal medicine ) we talked about and they will be calling you to schedule the visit.\" Thanks, Rebecca Mckenna MD     Patient has additional questions:  No  If YES, what are your questions/concerns:  n/a  Okay to leave a detailed message?: No call back needed  "

## 2021-05-31 NOTE — PROGRESS NOTES
First OB Visit     ASSESSMENT/PLAN    13w5d     1. Supervision of high-risk pregnancy of elderly multigravida  Transferring care to deliver at . Reviewed her initial OB and MICHELLE notes from [facility] avail in Care everywhere.    LMP of 5/15/2019. She had a first trimester ultrasound on 7/15/2019.  Gestational age by that ultrasound was 9 weeks 0 days.  Working gestational age was 8 weeks 5 days by LMP with KRISHNA of 2020.    Routine prenatal labs reviewed today: O+, Nieves neg. Hep B sAg neg, but not immune (Hep B Carla also neg). Hep C neg. HIV, G/C neg. Rubella immune. UA/Ucx neg. UDS neg. Hb 1st tri of 13.4, Platelets 141,  LFTs all elevated due to autoimmune hepatitis form  labs, but improved somewhat by today's  labs.    - Ambulatory referral to [facility]/genetics- Pregnancy: high risk conditions include AMA (NIPT was negative), autoimmune hepatitis on high risk medication (azathioprine), mild thrombocytopenia in pregnancy, recent itching without rash- working up for cholestasis of pregnancy.   - Bile Acids, Total  - Hepatic Profile    2. Itching  Given her h/o autoimmune hepatitis, concern is higher for flare of sx given recent tapering down of her azathioprine vs possible cholestasis of pregnancy. Will check bile acids and repeat hepatic profile today. Appreciate M input on this as well.   - Bile Acids, Total  - Hepatic Profile  Addendum: Bile acids elevated at 73. LFTs improved somewhat from , see #3 below. Called MFM when notified of elevated bile acids around 8am . Spoke with Dr. Robison to review pt's hx and recommended to see them soon (they will assist with scheduling) and touch base with GI clinic to discuss ursodiol and or prednisone initiation. Called GI clinic, Shirley Moran CNP who desires her to continue low dose azathioprine. Suspect itching s/t flare of her autoimmune hepatitis vs cholestasis.  Recommends giving pt more time. Can recheck LFTs/bile acid in 2 weeks.   If itching  continues, revisit with GI.   Benadryl ok.  The liver ultrasound scheduled for September can wait now that she is pregnant.   Will call pt to review this plan with her.       3. Hepatitis, autoimmune (H) on high risk medication   Dx 2018, has been on azathioprine (Preg risk category D) and asked by her GI provider to continue this in pregnancy. Labs reviewed (19 reflects baseline early-preg-->19 (was tapering down azathioprine due to pregnancy)-->19, post re-initiation of azathioprine). Overall improvement since re-initiating her azathioprine. Will co-manage with GI and MFM. Liver u/s is planned for September with GI.   Protein 8.9--> 9.4-->9.1  Albumin 3.8-->3.1--> 3.0  --> 173--> 162  Bilirubin total 0.6-->  1.4--> 1.0  Bilirubin direct 0.2--> 0.8--> 0.5  AST 41--> 594--> 448  ALT 34--> 630--> 456    Reviewed risk of azathioprine briefly with pt, and she will discuss further with MFM. Risks reviewed included: possible congenital anomalies, immunosuppression, IUGR,  labor, and other hematologic toxicity. Available guidelines show AZA is ok to continue for other conditions and her GI provider is aware of her pregnancy and requested she continue.     4. Thrombocytopenia affecting pregnancy (H)  Labs from IOB on 19 showed platelets of 141. Will continue to monitor. Appreciate MFM recs.     5. Migraine with aura and without status migrainosus, not intractable  Doing well without meds. Advised caution with triptan. OTC tylenol recommended. She is s/p occipital nerve blocks, does not plan to repeat during pregnancy.     6. Need for hepatitis B vaccination?  Hep B Surface antibody is noted to be negative 18 but positive 9/10/18 (considered immune), and negative again 19. ? If this is s/t her medications.     7. Cervical cancer screening- Pap 18- ASCUS with + HPV. Colposcopy done 18, no bx taken, otherwise normal. Plan to repeat Pap/HPV 1 year from then. Given current  pregnancy will defer to post partum visit.       Referral(s):   Maternal Fetal Medicine for Genetic counseling as well as evaluation given her high risk pregnancy state of autoimmune hepatitis, AMA.H/o pylonephritis    Discussed:  - Pre-pregnancy Body mass index is 26.62 kg/m .  - Recommended weight gain of  25-35 lbs for normal BMI.  - Influenza vaccine to be given in Sept  - Safe medications during pregnancy and prenatal vitamin daily discussed.   - Healthy habits including not using tobacco or alcohol, exercising regularly and maintaining healthy diet  - Information given on tips for dealing with nausea, healthy habits, exposures, safety, prenatal appointment schedule, and when to call the doctor.  - Recommendations for breastfeeding given     Follow up in 2 weeks for routine pre-riaz care.     I spent 60 minutes with the patient, >50% of which was in counseling regarding the patient's medical issues as noted above.    Rebecca Mckenna MD        SUBJECTIVE:  Earnestine Guillen is a 38 y.o.   female who presents to clinic for a new OB visit. Her last menstrual period was 5/15/19 (exact) with KRISHNA of 2020 c/w 1st trimester US.     Desires to deliver at [facility].   This was a planned pregnancy with her boyfriend-first child.  Has two children 18yo and 17yo and another father..  No complications with this pregnancies.      She is at 13w5d gestation today.     She has not had any bleeding, abdominal pain or cramping since her LMP. She has not had mild nausea. No vomiting. Weight loss has not occurred. Pre preg weight of 128lbs. Sore breasts.     OTHER CONCERNS: She does note itching of her hands and arms without rash which has been going on for the past week or so.  Not taking any medications for this.    She tells me she had genetic testing completed and she is expecting a baby boy and that testing was otherwise unremarkable.    Autoimmune hepatitis dx last year ~2018. She has been  following with nurse practitioner Shirley Tam for her autoimmune hepatitis with the  GI clinic and was told to taper off her azathioprine when she became pregnant but now they asked to restart this because liver labs were elevated at recent check 19. Has labs and a  liver ultrasound in September.     She had a history of pelvic inflammatory disease and recurrent UTIs and pyelonephritis which is actually what led to the diagnosis of autoimmune hepatitis last year by her account in 2018. UA and urine culture were neg at her initial OB with [facility].     Headaches: She has history of headaches every couple weeks- occipital neuralgia bilaterally and received an occipital nerve block on 2019.  She has been cleared to use Imitrex during pregnancy by her previous providers.  Needed this medication since becoming pregnant.  Avoiding any meds for headaches at this time. Reviewed tylenol for safety.       Imaging reviewed: Reviewed Dating Ultrasound:   impression:   Herring live Intrauterine pregnancy at 9w0d +/- 7 days by today's     ultrasound.     KRISHNA by ultrasound today is 20, which is consistent with LMP Estimated     Date of Delivery: 20.    Recommend using KRISHNA of 20.     Normal appearing uterus and adnexa.           Her obstetrical history is significant for advanced maternal age and thrombocytopenia, autoimmune hepatitis.   Relationship with FOB: significant other, living together.   .   Pregnancy history fully reviewed.    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    SO is boyfriend. Her two older children (17yo, 18yo) have a different father than current SO.     Never smoker. No drugs/alcohol use.     Rebecca Mckenna MD       Active Ambulatory Problems     Diagnosis Date Noted     Abnormal LFTs s/t autoimmune hepatitis      Cervical cancer screening- ASCUS/HPV+, repeat 8/2019- delay due to pregnancy 07/21/2017     Hepatitis, autoimmune (H) 09/06/2018     Migraine with aura and without status migrainosus, not intractable 07/23/2018     Need for hepatitis B vaccination 06/25/2019     Positive EMELIA (antinuclear antibody) 08/13/2018     Supervision of high-risk pregnancy of elderly multigravida 06/25/2019     Thrombocytopenia affecting pregnancy (H) 08/20/2019     Resolved Ambulatory Problems     Diagnosis Date Noted     Anemia      Leukopenia      UTI (urinary tract infection) 02/18/2018     Acute pyelonephritis      SIRS (systemic inflammatory response syndrome) (H)      Hydronephrosis, unspecified hydronephrosis type      PID (acute pelvic inflammatory disease)      Past Medical History:   Diagnosis Date     Acute pyelonephritis      Anemia      Autoimmune hepatitis (H) 2018     Cervicalgia      Migraine      PID (acute pelvic inflammatory disease) 2018     Transaminitis        The following portions of the patient's history were reviewed and updated as appropriate: allergies, current medications, past family history, past medical history, past social history, past surgical history and problem list.    Review of Systems  A 12 point comprehensive review of systems was negative except as noted.      PHYSICAL EXAM:  /72 (Patient Site: Left Arm, Patient Position: Sitting, Cuff Size: Adult Regular)   Pulse 76   Ht 5' (1.524 m)   Wt 136 lb 4.8 oz (61.8 kg)   LMP 05/15/2019 (Exact Date)   BMI 26.62 kg/m     GENERAL: Pleasant pregnant female, alert, well  groomed.  SKIN: Warm and dry, without lesions or rashes  EYES: PERRLA, EOM intact  MOUTH: Buccal mucosa pink, moist without lesions.   NECK: Thyroid without enlargement and nodules. No cervical lymphadenopathy.  LUNGS: Clear to auscultation.  BREAST: Symmetrical. No masses noted. No skin or nipple changes or axillary nodes.   HEART: RRR without murmur.  ABDOMEN: Soft without masses , tenderness or organomegaly. No CVA tenderness.  Fundus palpable below umbilicus c/w 14 week gestation. FHT 150s- visible by bedside ultrasound as were frequent fetal movements.  MUSCULOSKELETAL: Full range of motion.  EXTREMITIES: No edema. No significant varicosities.   : deferred      Rebecca Mckenna MD

## 2021-05-31 NOTE — TELEPHONE ENCOUNTER
"----- Message from Rebecca Mckenna MD sent at 8/21/2019 12:27 PM CDT -----  Called pt, left VM to call back. Would like the following information relayed to pt:     \"Your liver function tests are still high in the 450s, but lower than the 600s as they were on 8/5.  Bile acids were high at 73.  I spoke with your GI provider Shirley Tam who recommends we continue your azathioprine as she suspects your itching symptoms are most likely due to the fact that you were tapering down on the dose previously.  She recommends we give you a little more time to see how this improves your symptoms before considering different medication.  You can use Benadryl 25-50mg every 8 hours in the meantime for the itching which may provide some relief.  We should recheck your bile acids and liver function tests at your next OB visit in about 2 weeks.  I have also discussed this with the [facility] specialty doctor (maternal-fetal medicine ) we talked about and they will be calling you to schedule the visit.\" Thanks, Rebecca Mckenna MD    "

## 2021-06-01 NOTE — TELEPHONE ENCOUNTER
Called Dr. Dumont to review her recent visit, reviewed note as well.   High SSA and SSB antibody titres; ? Sjogren's. Can cause fetal heart block.   Recommend to revisit with her rheumatologist and we will help coordinate that appt. She normally sees Dr. Mark Neil (10/2018).  He may want to start Plaquenil.  Earnestine will need q 2 week ultrasounds to monitor LA interval to monitor for heart block until 26 weeks.   Baby needs EKG after birth, ?echo. At risk for transient  lupus syndrome- rash, transient     I informed Dr. Dumont's of pt's recent lab improvements as well.   Dr. Dumont's has spoken with patient about the brown spotting after intercourse when she was talking with pt about need to follow with Rheumatology and agrees good idea to have pt see me on Friday.

## 2021-06-01 NOTE — TELEPHONE ENCOUNTER
Body Mass Index: Care Instructions  Your Care Instructions    Body mass index (BMI) can help you see if your weight is raising your risk for health problems. It uses a formula to compare how much you weigh with how tall you are. · A BMI lower than 18.5 is considered underweight. · A BMI between 18.5 and 24.9 is considered healthy. · A BMI between 25 and 29.9 is considered overweight. A BMI of 30 or higher is considered obese. If your BMI is in the normal range, it means that you have a lower risk for weight-related health problems. If your BMI is in the overweight or obese range, you may be at increased risk for weight-related health problems, such as high blood pressure, heart disease, stroke, arthritis or joint pain, and diabetes. If your BMI is in the underweight range, you may be at increased risk for health problems such as fatigue, lower protection (immunity) against illness, muscle loss, bone loss, hair loss, and hormone problems. BMI is just one measure of your risk for weight-related health problems. You may be at higher risk for health problems if you are not active, you eat an unhealthy diet, or you drink too much alcohol or use tobacco products. Follow-up care is a key part of your treatment and safety. Be sure to make and go to all appointments, and call your doctor if you are having problems. It's also a good idea to know your test results and keep a list of the medicines you take. How can you care for yourself at home? · Practice healthy eating habits. This includes eating plenty of fruits, vegetables, whole grains, lean protein, and low-fat dairy. · If your doctor recommends it, get more exercise. Walking is a good choice. Bit by bit, increase the amount you walk every day. Try for at least 30 minutes on most days of the week. · Do not smoke. Smoking can increase your risk for health problems. If you need help quitting, talk to your doctor about stop-smoking programs and medicines. Reached out to patient and scheduled an appointment with Dr. Marshall on 09/16/2019. Instructed patient to call back with any additional concerns.   Thank you, Valerie Orozco   These can increase your chances of quitting for good. · Limit alcohol to 2 drinks a day for men and 1 drink a day for women. Too much alcohol can cause health problems. If you have a BMI higher than 25  · Your doctor may do other tests to check your risk for weight-related health problems. This may include measuring the distance around your waist. A waist measurement of more than 40 inches in men or 35 inches in women can increase the risk of weight-related health problems. · Talk with your doctor about steps you can take to stay healthy or improve your health. You may need to make lifestyle changes to lose weight and stay healthy, such as changing your diet and getting regular exercise. If you have a BMI lower than 18.5  · Your doctor may do other tests to check your risk for health problems. · Talk with your doctor about steps you can take to stay healthy or improve your health. You may need to make lifestyle changes to gain or maintain weight and stay healthy, such as getting more healthy foods in your diet and doing exercises to build muscle. Where can you learn more? Go to http://milka-pilar.info/. Enter S176 in the search box to learn more about \"Body Mass Index: Care Instructions. \"  Current as of: October 13, 2016  Content Version: 11.4  © 6750-1071 [employer]. Care instructions adapted under license by [employer] (which disclaims liability or warranty for this information). If you have questions about a medical condition or this instruction, always ask your healthcare professional. [address] any warranty or liability for your use of this information.

## 2021-06-01 NOTE — PROGRESS NOTES
Return OB Visit     ASSESSMENT/PLAN    16w5d by LMP c/w 1st tri us     1. Supervision of high-risk pregnancy of elderly multigravida  Transferred care to deliver at . Complex hx, prenatal chart previously reviewed in detail.   - MFM and genetics have seen pt on 9/3; awaiting finalized notes but appreciate recs. Pt indicates labs today will dictate their pending plans going forward.  - F/u with myself in 3 to 4 weeks, anatomy scan to be ordered at that time  - Bile Acids, Total  - CMP  - CBC w/ plts    2. Itching-improving; suspected secondary to flare of autoimmune hepatitis as she was tapering off azathioprine  Rechecking hepatic profile today as per plan with her GI provider Shirley Moran.  Possible cholestasis of pregnancy though this is less likely given improvement in her itching even without use of hydroxyzine as she has restarted her azathioprine at her prepregnancy dose.   - checking labs as above  - ok to decrease dose of hydroxyzine to 50 mg at bedtime and during the day if tolerates without dizziness    3. Hepatitis, autoimmune (H) on high risk medication   Dx 2018, initially tapered down on azathrioprine when she conceived, resulting in presumed flare given elevated LFTs/bile acids. GI and MFM following.  - Checking labs today to determine management.   - GI appt on     4. Thrombocytopenia affecting pregnancy (H)  Labs from IOB on 19 showed platelets of 141. Will continue to monitor- checking today. Appreciate MFM recs.     5. Migraine with aura and without status migrainosus, not intractable  Doing well without meds. Advised caution with triptan. OTC tylenol recommended. She is s/p occipital nerve blocks, does not plan to repeat during pregnancy.     6. Need for hepatitis B vaccination?  Hep B Surface antibody is noted to be negative 18 but positive 9/10/18 (considered immune), and negative again 19. ? If this is s/t her medications.     7. Cervical cancer screening- Pap  18- ASCUS with + HPV. Colposcopy done 18, no bx taken, otherwise normal. Plan to repeat Pap/HPV 1 year from then. Given current pregnancy will defer to post partum visit.     Discussed:  - Pre-pregnancy BMI 27.    - Recommended weight gain of  25-35 lbs for normal BMI.  - Influenza vaccine to be given in Sept when avail.   - Safe medications during pregnancy and prenatal vitamin daily discussed.   - Healthy habits including not using tobacco or alcohol, exercising regularly and maintaining healthy diet  - Information given on tips for dealing with nausea, healthy habits, exposures, safety, prenatal appointment schedule, and when to call the doctor.  - Recommendations for breastfeeding given     Follow up in 3-4 weeks for routine pre- care for 20wk check.       I spent 25 minutes with the patient, >50% of which was in counseling regarding patient's medical issues as noted above.    Rebecca Mckenna MD        SUBJECTIVE:  Earnestine Guillen is a 38 y.o.   female who presents to clinic for a return OB visit. Her last menstrual period was 5/15/19 (exact) with KRISHNA of 2020 c/w 1st trimester US.     Interval update:   Itching is much better since going back to daily Azathioprine dosing. Hydroxyzine helps a little but causes her to feel dizzy. She is only able to take 2 capsules (100mg) at bedtime. Feels the itching got better even without the hydroxyzine.  No rash.  She did meet with genetic counselor and Dr. Robison on 9/3. They are planning for an ultrasound- possibly for nuchal translucency.   Has an appt with her GI provider Shirley on .   She has not had any bleeding, abdominal pain or cramping since her LMP. She has not had mild nausea. No vomiting. Weight loss has not occurred. Pre preg weight of 128lbs. Sore breasts.     Autoimmune hepatitis dx last year ~2018. She has been following with nurse practitioner Shirley Tam for her autoimmune hepatitis with the  GI clinic and was told  to taper off her azathioprine when she became pregnant but now they asked to restart this because liver labs were elevated at recent check 8/5/19. Has labs and a  liver ultrasound in September.     She had a history of pelvic inflammatory disease and recurrent UTIs and pyelonephritis which is actually what led to the diagnosis of autoimmune hepatitis last year by her account in 2018. UA and urine culture were neg at her initial OB with [facility].     Headaches: She has history of headaches every couple weeks- occipital neuralgia bilaterally and received an occipital nerve block on 7/16/2019.  She has been cleared to use Imitrex during pregnancy by her previous providers.  Avoiding any meds for headaches at this time. Reviewed tylenol for safety.     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    SO is boyfriend. Her two older children (17yo, 18yo) have a different father than current SO.     Never smoker. No drugs/alcohol use.     Rebecca Mckenna MD       Active Ambulatory Problems     Diagnosis Date Noted     Abnormal LFTs s/t autoimmune hepatitis      Cervical cancer screening- ASCUS/HPV+, repeat 8/2019- delay due to pregnancy 07/21/2017     Hepatitis, autoimmune (H) 09/06/2018     Migraine with aura and without status migrainosus, not intractable 07/23/2018     Need for hepatitis B vaccination 06/25/2019     Positive EMELIA (antinuclear antibody) 08/13/2018     Supervision of high-risk pregnancy of elderly multigravida 06/25/2019     Thrombocytopenia affecting pregnancy (H) 08/20/2019     Resolved Ambulatory Problems     Diagnosis Date Noted     Anemia      Leukopenia      UTI (urinary tract infection) 02/18/2018     Acute pyelonephritis      SIRS (systemic inflammatory response syndrome) (H)      Hydronephrosis, unspecified hydronephrosis type       PID (acute pelvic inflammatory disease)      Past Medical History:   Diagnosis Date     Acute pyelonephritis      Anemia      Autoimmune hepatitis (H) 2018     Cervicalgia      Migraine      PID (acute pelvic inflammatory disease) 2018     Transaminitis        The following portions of the patient's history were reviewed and updated as appropriate: allergies, current medications, past family history, past medical history, past social history, past surgical history and problem list.    Review of Systems  A 12 point comprehensive review of systems was negative except as noted.      PHYSICAL EXAM:  BP 96/66 (Patient Site: Left Arm, Patient Position: Sitting, Cuff Size: Adult Regular)   Pulse 76   Wt 138 lb 9.6 oz (62.9 kg)   LMP 05/15/2019 (Exact Date)   BMI 27.07 kg/m     GENERAL: Pleasant pregnant female, alert, well groomed.  SKIN: Warm and dry, without lesions or rashes  EYES: PERRLA, EOM intact  MOUTH: Buccal mucosa pink, moist without lesions.   NECK: Thyroid without enlargement and nodules. No cervical lymphadenopathy.  LUNGS: Clear to auscultation.  BREAST: Symmetrical. No masses noted. No skin or nipple changes or axillary nodes.   HEART: RRR without murmur.  ABDOMEN: Soft without masses , tenderness or organomegaly. No CVA tenderness.  Fundus palpable below umbilicus c/w 17 week gestation. FHT 150s- visible by bedside ultrasound as were frequent fetal movements.  MUSCULOSKELETAL: Full range of motion.  EXTREMITIES: No edema. No significant varicosities.   Skin: no rash      Rebecca Mckenna MD

## 2021-06-01 NOTE — TELEPHONE ENCOUNTER
Provider Communication  Who is calling:  Dr Debbi Dumont  Facility in which provider is associated:  Maternal Fetal Medicine  Reason for call:  States just had office visit with patient and she would like to discuss it with the provider.  Urgency for return call:  as available today.  Okay to leave detailed message?:  Yes

## 2021-06-01 NOTE — TELEPHONE ENCOUNTER
Triage call:   Pregnant 16W6D - small amount of brown blood this morning when she urinated. Has not had blood since, had sex last night. No additional symptoms. Vaginal area is slightly uncomfortable.     Triaged to continue home care and reviewed additional care advice with patient. She verbalizes understanding and is reassured.     Angelika Rosenbaum RN BA [facility] Triage/Med Refill 9/10/2019 12:51 PM    Reason for Disposition    SPOTTING (single or brief episode)    Protocols used: PREGNANCY - VAGINAL BLEEDING LESS THAN 20 WEEKS EGA-A-OH

## 2021-06-01 NOTE — PATIENT INSTRUCTIONS - HE
Take one capsule of hydroxyzine (the anti-itch) medicine up to three times per day. If still dizzy or tired, then let me know and we can send a smaller dose.

## 2021-06-01 NOTE — TELEPHONE ENCOUNTER
Please see the below message and advise. Dr. Dumont with Maternal Fetal Medicine can be reached at:   [phone].  Thank you, Valerie Orozco

## 2021-06-01 NOTE — PROGRESS NOTES
"Please see \"Imaging\" tab under \"Chart Review\" for details of today's visit.    Dung Ríos        "

## 2021-06-01 NOTE — PROGRESS NOTES
Return OB Visit     ASSESSMENT/PLAN    19w5d by LMP c/w 1st tri us    Supervision of high-risk pregnancy of elderly multigravida in 2nd trimester.  High risk conditions: AMA age 38, Auotimmune hepatitis, +SSA/SSB antibodies, likely Sjogren's; also with placenta previa  Followed by MFM- risk for fetal heart block, undergoing routine echos/DE intervals; risk for transient  lupus syndrome (rash) at birth- baby will need EKG  - Genetics testing offered, NIPT negative, declines further testing  - Comprehensive anatomy scan 19 reviewed, normal aside from placenta previa  - Fetal echo and fetal DE intervals done at alternating visits with MFM, reviewed these recent normal results form  and   - she was scheduled for Rhuematology with Dr. Neil, needs to be rescheduled asap to consider plaquenil in setting of +SSA/SSB in pregnancy. We will help her reschedule this. She does have dry eyes/dry mouth this pregnancy. Never previously told she had a dx of sjogrens however.     Placenta previa.  Noted on u/s from . Intermittent spotting since 9/10 as a result of low lying placenta. Cervix is closed today; no bimanual performed.   Pelvic rest x 1-2 months until next evaluation at [facility] during scheduled ultrasounds  Work note provided today.      Hepatitis, autoimmune (H) on high risk medication;  Dx 2018, initially tapered down on azathrioprine when she conceived, resulting in presumed flare during 1st trimester based on elevated LFTs/bile acids. GI and MFM following, unlikely cholestasis at this point.  - GI appt on , with improvement in labs reviewed in [facility]. Reviewed labs from  compared to 19 via her GI clinic showing normal CBC and great improvement in her LFTs.  ALP went from 173 to 132,  down to 171 now, ALT down from 630 to 175, bilirubin total and direct are now in the normal.  - GI clinic plans to repeat and will reach out to pt when due. Otherwise doesn't need  follow up for 6 months if all goes well.   - continues with prn use of hydroxyzine once every other day for itching from the hepatitis flare during her 1st trimester  - no prednisone indicated at this time    Thrombocytopenia affecting pregnancy- resolved at last visit  Labs from IOB on 19 showed platelets of 141--> 149 on 19. Will continue to monitor when next due for labs    Migraine with aura and without status migrainosus, not intractable  Doing well without meds. Advised caution with triptan. OTC tylenol recommended. She is s/p occipital nerve blocks, does not plan to repeat during pregnancy.     Post delivery cares:  Notify NICU at delivery given maternal +SSA/SSB antibody and risk for fetal heart block and transient  lupus syndrome (rash, etc).   Baby will need EKG  Mom needs Hep B vaccine? (unclear immune status as was previously immune then not, ? If med related)    Discussed:  - Pre-pregnancy BMI 27.    - Recommended weight gain of  25-35 lbs for normal BMI.  - Influenza vaccine to be given in Sept when avail. - declines today, will address next visit  - Safe medications during pregnancy and prenatal vitamin daily discussed.   - Healthy habits including not using tobacco or alcohol, exercising regularly and maintaining healthy diet  - Information given on tips for dealing with nausea, healthy habits, exposures, safety, prenatal appointment schedule, and when to call the doctor.  - Recommendations for breastfeeding given     She will follow-up with me for 24-week visit for flu shot, 1 hour GTT, CBC, syphilis at that time.      I spent 25 minutes with the patient, >50% of which was in counseling regarding patient's medical issues as noted above.    Rebecca Mckenna MD        SUBJECTIVE:  Earnestine Guillen is a 38 y.o.   female who presents to clinic for a return OB visit. Her last menstrual period was 5/15/19 (exact) with KRISHNA of 2020 c/w 1st trimester US.     Interval update:    Still bleeding on and off since 9/10 (started after intercourse) due to low lying placenta noted on 9/23 ultrasound. She notices brown spotting, light pink maybe 2 or 3 days per week. No bright red bleeding. Was recommended by MFM for pelvic rest for 2 months and repeat u/s after that.     She was scheduled with Rheumatology Dr. Mark Neil ( Clinic) on Thursday 9/26 but couldn't go as it sounds like she was not asked for her own availability before this was being scheduled; hoping to reschedule for this week.     She has been working 10 hour days at [employer], causing low back pain. She needs a note to say she can use a chair. She previously had a restriction for 50lbs from her Ob doctor at previous clinic. She is wondering if she can do less weight due to the back pain and intermittent spotting given her placenta previa.  Nov 1st is last day of work.     Feeling a little lightheaded and dizzy with standing. Hb 12.4 on 9/24.   Itching is much better   No rash.  Had labs with GI clinic recently and we reviewed these-continuing to improve. Plan is to have these repeated through her GI clinic in a few weeks, the clinic will be reaching out to her to schedule that.    Social History     Patient does not qualify to have social determinant information on file (likely too young).   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    SO is boyfriend. Her two older children (17yo, 20yo) have a different father than current SO.     Never smoker. No drugs/alcohol use.     Rebecac Mckenna MD       Active Ambulatory Problems     Diagnosis Date Noted     Abnormal LFTs s/t autoimmune hepatitis      Cervical cancer screening- ASCUS/HPV+, repeat 8/2019- delay due to pregnancy 07/21/2017     Hepatitis, autoimmune (H) 09/06/2018     Migraine with aura and without status  migrainosus, not intractable 07/23/2018     Need for hepatitis B vaccination 06/25/2019     Positive EMELIA (antinuclear antibody) 08/13/2018     Supervision of high-risk pregnancy of elderly multigravida 06/25/2019     Thrombocytopenia affecting pregnancy (H) 08/20/2019     Resolved Ambulatory Problems     Diagnosis Date Noted     Anemia      Leukopenia      UTI (urinary tract infection) 02/18/2018     Acute pyelonephritis      SIRS (systemic inflammatory response syndrome) (H)      Hydronephrosis, unspecified hydronephrosis type      PID (acute pelvic inflammatory disease)      Past Medical History:   Diagnosis Date     Anemia      Autoimmune hepatitis (H) 2018     Cervicalgia      Migraine      Transaminitis        The following portions of the patient's history were reviewed and updated as appropriate: allergies, current medications, past family history, past medical history, past social history, past surgical history and problem list.    Review of Systems  A 12 point comprehensive review of systems was negative except as noted.      PHYSICAL EXAM:  LMP 05/15/2019 (Exact Date)    GENERAL: Pleasant pregnant female, alert, well groomed.  SKIN: Warm and dry, without lesions or rashes  EYES: PERRLA, EOM intact  MOUTH: Buccal mucosa pink, tacky/dry without lesions.   NECK: Thyroid without enlargement and nodules. No cervical lymphadenopathy.  LUNGS: Clear to auscultation.  BREAST: Symmetrical. No masses noted. No skin or nipple changes or axillary nodes.   HEART: RRR without murmur.  ABDOMEN: Soft without masses , tenderness or organomegaly. No CVA tenderness.  Fundus at 18.5cm, FHTs 140s.   : cervix closed, physiologic discharge, no bleeding from the os. No bimanual performed given placenta previa has been documented.   MUSCULOSKELETAL: Full range of motion.  EXTREMITIES: No edema. No significant varicosities.   Skin: no rash      Rebecca Mckenna MD

## 2021-06-01 NOTE — TELEPHONE ENCOUNTER
Earnestine is scheduled with Dr. Mark Neil on Thursday 9/26 3:00pm with the [facility].  Patient has been notified via [employer] message.

## 2021-06-01 NOTE — TELEPHONE ENCOUNTER
Called Earnestine & claire message because she was on our schedule today at 4pm to follow up on the vaginal bleeding and noticed she cancelled. Was calling to see if she was still experiencing these symptoms. Let her know to call us if there were questions or concerns.   Hermelinda Mitchell, DEMARCO

## 2021-06-02 NOTE — TELEPHONE ENCOUNTER
Reached out to patient and was informed she will be completing an CECILE, so we are able to provider her disability group with medical information. Will wait for patient to drop off release. Thank you, Valerie Orozco

## 2021-06-02 NOTE — PROGRESS NOTES
"Please see \"Imaging\" tab under Chart Review for full report.  This ultrasound was performed in the [facility], and may be located under Care Everywhere.    Kate Romero MD  Maternal Fetal Medicine    "

## 2021-06-02 NOTE — TELEPHONE ENCOUNTER
Who is calling:  Tritsa chaing  from the [employer].   Reason for Call:  Caller stated I notice patient is pregnancy and we need to know if the reason for this disability is due to her current work requirements and if weight restrictions were needed?   Date of last appointment with primary care: 9/30/19  Okay to leave a detailed message: Yes

## 2021-06-03 VITALS
WEIGHT: 138.6 LBS | HEART RATE: 76 BPM | DIASTOLIC BLOOD PRESSURE: 66 MMHG | SYSTOLIC BLOOD PRESSURE: 96 MMHG | BODY MASS INDEX: 27.07 KG/M2

## 2021-06-03 VITALS — HEIGHT: 60 IN | BODY MASS INDEX: 26.76 KG/M2 | WEIGHT: 136.3 LBS

## 2021-06-03 VITALS
WEIGHT: 141 LBS | HEART RATE: 80 BPM | SYSTOLIC BLOOD PRESSURE: 102 MMHG | BODY MASS INDEX: 27.54 KG/M2 | DIASTOLIC BLOOD PRESSURE: 62 MMHG

## 2021-06-03 VITALS
HEART RATE: 64 BPM | BODY MASS INDEX: 27.05 KG/M2 | DIASTOLIC BLOOD PRESSURE: 76 MMHG | SYSTOLIC BLOOD PRESSURE: 104 MMHG | WEIGHT: 138.5 LBS

## 2021-06-03 NOTE — PROGRESS NOTES
History of GDM x2, diet controlled previously.  She had 6 and 7 pound babies.  Met with Earnestine today to review her failed 3-hour glucose testing.  Reviewed maternal and fetal morbidity associated with GDM.  - Discussed that blood glucose monitoring should be performed at least four times daily (fasting and one or two hours after the first bite of each meal) for the first two weeks after diagnosis   ?Fasting blood glucose concentration: <95 mg/dL (5.3 mmol/L)   ?One-hour postprandial blood glucose concentration: <140 mg/dL (7.8 mmol/L)   ?Two-hour postprandial glucose concentration: <120 mg/dL (6.7 mmol/L)  -Referred patient to diabetes education for gestational diabetes  -Rx sent for glucometer and test strips to have her start checking these.    Will see in clinic in 2 weeks

## 2021-06-03 NOTE — TELEPHONE ENCOUNTER
Name of form/paperwork: Other:  Disability  Have you been seen for this request: Yes:  Patient states provider wanted a reminder to send in patient's disability paperwork.  Has it been faxed back?  Do we have the form: Yes- faxed to clinic by [employer]  When is form needed by: ASAP  How would you like the form returned: fax  Fax Number: see form Patient Notified form requests are processed in 3-5 business days: No  (If patient needs form sooner, please note that in this message.)  Okay to leave a detailed message? Yes

## 2021-06-03 NOTE — TELEPHONE ENCOUNTER
Spoke with patient. Stated that I have tried faxing the paperwork multiple times and called the [employer] to try a different fax. They stated that is the only fax and another option was to have the patient bring the paperwork in to them. Form is placed at the  for the patient to  and have also sent one to scan.   Hermelinda Mitchell, ARIANNAN

## 2021-06-03 NOTE — PROGRESS NOTES
Return OB Visit     ASSESSMENT/PLAN    25w0d by LMP c/w 1st tri us    Supervision of high-risk pregnancy of elderly multigravida in 2nd trimester.  High risk conditions: AMA age 38, Auotimmune hepatitis, +SSA/SSB antibodies, likely Sjogren's; also with placenta previa  Followed by MFM- risk for fetal heart block, undergoing routine echos/OR intervals; risk for transient  lupus syndrome (rash) at birth- baby will need EKG  - Genetics testing offered, NIPT negative, declines further testing  - Comprehensive anatomy scan 19 reviewed, normal aside from placenta previa  - Fetal echo and fetal OR intervals done at alternating visits with MFM, reviewed these recent normal results on 19, last OR assessment in 2 weeks.   - Flu shot today   - UA today for urinary discomfort  - 1hr GTT today    Placenta previa.  Noted on u/s from . Intermittent spotting since 9/10 as a result of low lying placenta until about 2 weeks ago. Was on pelvic rest.   - Recent ultrasound  shows still posterior placenta previa and reassess in 4 weeks.      Sjogren's syndrome +SSA/SSB in pregnancy.  -She has met with rheumatology, Dr. Neil 10/7/19 and has been started on Plaquenil twice daily And eyedrops  -Labs were done to monitor for lymphoma   -Recommendation to follow-up in 6 months with rheumatology    Hepatitis, autoimmune (H) on high risk medication;  Dx 2018, initially tapered down on azathrioprine when she conceived, resulting in presumed flare during 1st trimester based on elevated LFTs/bile acids. GI and MFM following, unlikely cholestasis at this point.  - GI appt on , with improvement in labs reviewed in [facility]. Reviewed labs from  compared to 19 via her GI clinic showing normal CBC and great improvement in her LFTs.  ALP went from 173 to 132,  down to 171 now, ALT down from 630 to 175, bilirubin total and direct are now in the normal.   - Rheumatology repeated these labs  10/7/2019, , .    - F/u with GI around March   - continues with prn use of hydroxyzine once every other day for itching from the hepatitis flare during her 1st trimester  - no prednisone indicated at this time      Post delivery cares:  Notify NICU at delivery given maternal +SSA/SSB antibody and risk for fetal heart block and transient  lupus syndrome (rash, etc).   Baby will need EKG  Mom needs Hep B vaccine? (unclear immune status as was previously immune then not, ? If med related)    Discussed:  - Pre-pregnancy BMI 27.    - Recommended weight gain of  25-35 lbs for normal BMI.  - Influenza vaccine to be given in Sept when avail. - declines today, will address next visit  - Safe medications during pregnancy and prenatal vitamin daily discussed.   - Healthy habits including not using tobacco or alcohol, exercising regularly and maintaining healthy diet  - Information given on tips for dealing with nausea, healthy habits, exposures, safety, prenatal appointment schedule, and when to call the doctor.  - Recommendations for breastfeeding given     She will follow-up with me for 24-week visit for flu shot, 1 hour GTT, CBC, syphilis at that time.      I spent 25 minutes with the patient, >50% of which was in counseling regarding patient's medical issues as noted above.    Rebecca Mckenna MD        SUBJECTIVE:  Earnestine Guillen is a 38 y.o.   female who presents to clinic for a return OB visit. Her last menstrual period was 5/15/19 (exact) with KRISHNA of 2020 c/w 1st trimester US.     Interval update:     The bleeding from her placenta previa stopped about 2 weeks ago.   Urinary discomfort- frequency and mild dysuria. Feels a little dehydrated.     She had a recent ultrasound with MFM on  and the placenta has moved up a little, but still consider previa.  She is aware of this.  She is also met with her rheumatologist and they have started Plaquenil twice daily.  She is tolerating this  without side effects and understands it may take 6 months for full effect.      She recently started short-term disability, the first day of disability being 11/2.  She plans to take this for 3 months, then followed by a 3-month pregnancy related leave.      Itching is resolved  No rash.    Reviewed [facility] ultrasound dated 11/4/2019   IMPRESSION  ---------------------------------------------------------------------------------------------------------  1) Intrauterine pregnancy at 24 5/7 weeks gestational age.  2) None of the anomalies commonly detected by ultrasound were evident in the fetal anatomic survey described above.  3) Growth parameters and estimated fetal weight were consistent with an appropriate for gestation age pattern of growth.  4) The amniotic fluid volume appeared normal.  5) A posterior placenta previa was again seen. The leading edge of the placenta is immediately adjacent to the internal os by transabdominal imaging.  6) The NJ interval was normal (114 ms).      Social History     Patient does not qualify to have social determinant information on file (likely too young).   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    SO is boyfriend. Her two older children (17yo, 18yo) have a different father than current SO.     Never smoker. No drugs/alcohol use.     Rebecca Mckenna MD       Active Ambulatory Problems     Diagnosis Date Noted     Abnormal LFTs s/t autoimmune hepatitis      Cervical cancer screening- ASCUS/HPV+, repeat 8/2019- delay due to pregnancy 07/21/2017     Hepatitis, autoimmune (H) 09/06/2018     Migraine with aura and without status migrainosus, not intractable 07/23/2018     Need for hepatitis B vaccination 06/25/2019     Positive EMELIA (antinuclear antibody) 08/13/2018     Supervision of high-risk pregnancy of elderly multigravida  06/25/2019     Thrombocytopenia affecting pregnancy (H) 08/20/2019     SS-A and SS-B antibody positive 09/30/2019     Sjogren's syndrome (H)iwth ++ SSA/SSB 09/30/2019     Placenta previa in second trimester 10/01/2019     Resolved Ambulatory Problems     Diagnosis Date Noted     Anemia      Leukopenia      UTI (urinary tract infection) 02/18/2018     Acute pyelonephritis      SIRS (systemic inflammatory response syndrome) (H)      Hydronephrosis, unspecified hydronephrosis type      PID (acute pelvic inflammatory disease)      Past Medical History:   Diagnosis Date     Anemia      Autoimmune hepatitis (H) 2018     Cervicalgia      Migraine      Transaminitis        The following portions of the patient's history were reviewed and updated as appropriate: allergies, current medications, past family history, past medical history, past social history, past surgical history and problem list.    Review of Systems  A 12 point comprehensive review of systems was negative except as noted.      PHYSICAL EXAM:  /62 (Patient Site: Left Arm, Patient Position: Sitting, Cuff Size: Adult Regular)   Pulse 80   Wt 141 lb (64 kg)   LMP 05/15/2019 (Exact Date)   BMI 27.54 kg/m     GENERAL: Pleasant pregnant female, alert, well groomed.  SKIN: Warm and dry, without lesions or rashes  EYES: PERRLA, EOM intact  MOUTH: Buccal mucosa pink, tacky/dry without lesions.   NECK: Thyroid without enlargement and nodules. No cervical lymphadenopathy.  LUNGS: Clear to auscultation.  BREAST: Symmetrical. No masses noted. No skin or nipple changes or axillary nodes.   HEART: RRR without murmur.  ABDOMEN: Soft without masses , tenderness or organomegaly. No CVA tenderness.  Fundus at 25cm, FHTs 138  : cervix closed, physiologic discharge, no bleeding from the os. No bimanual performed given placenta previa has been documented.   MUSCULOSKELETAL: Full range of motion.  EXTREMITIES: No edema. No significant varicosities.   Skin: no  manoj Mckenna MD

## 2021-06-03 NOTE — PROGRESS NOTES
"Please see the \"Imaging\" tab for details of today's ultrasound.    Elizabeth Christianson MD  Specialist in Maternal-Fetal Medicine    "

## 2021-06-04 VITALS
BODY MASS INDEX: 28.4 KG/M2 | HEART RATE: 92 BPM | SYSTOLIC BLOOD PRESSURE: 110 MMHG | WEIGHT: 145.4 LBS | DIASTOLIC BLOOD PRESSURE: 76 MMHG

## 2021-06-04 VITALS
SYSTOLIC BLOOD PRESSURE: 120 MMHG | DIASTOLIC BLOOD PRESSURE: 80 MMHG | HEART RATE: 72 BPM | HEIGHT: 61 IN | WEIGHT: 141.2 LBS | BODY MASS INDEX: 26.66 KG/M2

## 2021-06-04 VITALS
DIASTOLIC BLOOD PRESSURE: 66 MMHG | BODY MASS INDEX: 28.4 KG/M2 | HEART RATE: 88 BPM | WEIGHT: 145.4 LBS | SYSTOLIC BLOOD PRESSURE: 96 MMHG

## 2021-06-04 VITALS — WEIGHT: 143.75 LBS | BODY MASS INDEX: 28.07 KG/M2

## 2021-06-04 VITALS
HEART RATE: 87 BPM | DIASTOLIC BLOOD PRESSURE: 68 MMHG | BODY MASS INDEX: 28.08 KG/M2 | SYSTOLIC BLOOD PRESSURE: 99 MMHG | OXYGEN SATURATION: 98 % | WEIGHT: 143.8 LBS

## 2021-06-04 VITALS — BODY MASS INDEX: 28.32 KG/M2 | WEIGHT: 145 LBS

## 2021-06-04 NOTE — PROGRESS NOTES
Return OB Visit     ASSESSMENT/PLAN    29w2d by LMP c/w 1st tri us    Supervision of high-risk pregnancy of elderly multigravida in 3rdtrimester.  High risk conditions: AMA age 38, Auotimmune hepatitis, +SSA/SSB antibodies, likely Sjogren's; also with placenta previa, GDM  Followed by [facility]- risk for fetal heart block, undergoing routine echos/NM intervals; risk for transient  lupus syndrome (rash) at birth- baby will need EKG  - Genetics testing offered, NIPT negative, declines further testing  - Comprehensive anatomy scan 19 reviewed, normal aside from placenta previa  - Fetal echo and fetal NM intervals done at alternating visits with MFM, reviewed these recent normal results on 19, last NM assessment  Done  and normal.   - Growth US done 19 with MFM, f/u in 5 weeks  - Flu shot given  - Tdap today    Gestational diabetes. Failed 3hr testing. History of GDM x2, diet controlled previously.  She had 6 and 7 pound babies.  She met with diabetic educator today, has been checking sugars 1-3 times daily and all but one value was at goal.  -Check blood glucose 4X per day, with daily ketones  -Reviewed 1 hour postprandial goals less than 140 and 2-hour less than 120.  -She will write down her numbers for our review.  -Continue GDM meal plan, 3 meals with snacks.  -Follow-up with diabetic educator in 1 week    Placenta previa- now low lying.   Noted on u/s from . Intermittent spotting since 9/10 as a result of low lying placenta until about 23 weeks. Was on pelvic rest.    - Recent ultrasound  shows leading edge of placenta is 1.8cm from internal os. The placenta is low-lying (marginal venous sinus at the leading edge of placenta measure 1.8 cm from the internal os).  - Repeat u/s in 5 weeks to confirm growth and placental location.      Sjogren's syndrome +SSA/SSB in pregnancy.  -She has met with rheumatology, Dr. Neil 10/7/19 and has been started on Plaquenil twice daily And  eyedrops  -Labs were done to monitor for lymphoma   -Recommendation to follow-up in 6 months with rheumatology    Hepatitis, autoimmune (H) on high risk medication;  Dx 2018, initially tapered down on azathrioprine when she conceived, resulting in presumed flare during 1st trimester based on elevated LFTs/bile acids. GI and MFM following, unlikely cholestasis at this point.  - GI appt on , with improvement in labs reviewed in [facility]. Reviewed labs from  compared to 19 via her GI clinic showing normal CBC and great improvement in her LFTs.  ALP went from 173 to 132,  down to 171 now, ALT down from 630 to 175, bilirubin total and direct are now in the normal.   - Rheumatology repeated these labs 10/7/2019, , .    - F/u with GI around March   - continues with prn use of hydroxyzine once every other day for itching from the hepatitis flare during her 1st trimester  - no prednisone indicated at this time  - recheck LFTs today    Post delivery cares:  Notify NICU at delivery given maternal +SSA/SSB antibody and risk for fetal heart block and transient  lupus syndrome (rash, etc).   Baby will need EKG  Mom needs Hep B vaccine? (unclear immune status as was previously immune then not, ? If med related)    Discussed:  - Pre-pregnancy BMI 27.    - Recommended weight gain of  25-35 lbs for normal BMI.  - Influenza vaccine  Given  -  labor sx/signs  - Recommendations for breastfeeding given     She will follow-up with me in 2 weeks     I spent 40 minutes with the patient, >50% of which was in counseling regarding patient's medical issues as noted above.    Rebecca Mckenna MD        SUBJECTIVE:  Earnestine Guillen is a 38 y.o.   female who presents to clinic for a return OB visit.     Interval update:   Her recent ultrasound 2 days ago with MFM showed the placenta is now considered low-lying.  She reports that the doctor there indicated would be reasonable for  vaginal delivery at this point but we will re-evaluate going forward.    She met with the gestational diabetes educator today.  She has been checking blood sugars 1-3 times per week.  She has all the blood sugar data on her glucometer which is at home, but she wrote down a few that was brought to the educator visit.  She is eating 3 meals a day, her  is going to a vegan diet so she is adopted these changes as well.  See that note for details.    Regarding her hepatitis, no abdominal pain and itching has resolved.  No rash.  She is tolerating the Plaquenil for her Sjogren's.    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    SO is boyfriend. Her two older children (15yo, 20yo) have a different father than current SO.     Never smoker. No drugs/alcohol use.     Rebecca Mckenna MD       Active Ambulatory Problems     Diagnosis Date Noted     Abnormal LFTs s/t autoimmune hepatitis      Cervical cancer screening- ASCUS/HPV+, repeat 8/2019- delay due to pregnancy 07/21/2017     Hepatitis, autoimmune (H) 09/06/2018     Migraine with aura and without status migrainosus, not intractable 07/23/2018     Need for hepatitis B vaccination 06/25/2019     Positive EMELIA (antinuclear antibody) 08/13/2018     Supervision of high-risk pregnancy of elderly multigravida 06/25/2019     Thrombocytopenia affecting pregnancy (H) 08/20/2019     SS-A and SS-B antibody positive 09/30/2019     Sjogren's syndrome (H)iwth ++ SSA/SSB 09/30/2019     Placenta previa in second trimester 10/01/2019     Resolved Ambulatory Problems     Diagnosis Date Noted     Anemia      Leukopenia      UTI (urinary tract infection) 02/18/2018     Acute pyelonephritis      SIRS (systemic inflammatory response syndrome) (H)      Hydronephrosis, unspecified hydronephrosis type      PID (acute  pelvic inflammatory disease)      Past Medical History:   Diagnosis Date     Anemia      Autoimmune hepatitis (H) 2018     Cervicalgia      Migraine      Transaminitis        The following portions of the patient's history were reviewed and updated as appropriate: allergies, current medications, past family history, past medical history, past social history, past surgical history and problem list.    Review of Systems  A 12 point comprehensive review of systems was negative except as noted.      PHYSICAL EXAM:  Wt 145 lb 6.4 oz (66 kg)   LMP 05/15/2019 (Exact Date)   BMI 28.40 kg/m     GENERAL: Pleasant pregnant female, alert, well groomed.  SKIN: Warm and dry, without lesions or rashes  EYES: PERRLA, EOM intact  MOUTH: Buccal mucosa pink, tacky/dry without lesions.   NECK: Thyroid without enlargement and nodules. No cervical lymphadenopathy.  LUNGS: Clear to auscultation.  BREAST: Symmetrical. No masses noted. No skin or nipple changes or axillary nodes.   HEART: RRR without murmur.  ABDOMEN: Soft without masses , tenderness or organomegaly. No CVA tenderness.  Fundus at 29cm, FHTs 122  : cervix closed, physiologic discharge, no bleeding from the os. No bimanual performed given placenta previa has been documented.   MUSCULOSKELETAL: Full range of motion.  EXTREMITIES: No edema. No significant varicosities.   Skin: no rash      Rebecca Mckenna MD

## 2021-06-04 NOTE — PROGRESS NOTES
Return OB Visit     ASSESSMENT/PLAN    31w2d  KRISHNA 2020 by LMP c/w 1st tri us    Supervision of high-risk pregnancy of elderly multigravida in 3rdtrimester.  High risk conditions: AMA age 38, Auotimmune hepatitis, +SSA/SSB antibodies, likely Sjogren's; also with placenta previa, GDM  Followed by MFM- risk for fetal heart block, undergoing routine echos/FL intervals; risk for transient  lupus syndrome (rash) at birth- baby will need EKG  - Genetics testing offered, NIPT negative, declines further testing  - Comprehensive anatomy scan 19 reviewed, normal aside from placenta previa  - Fetal echo and fetal FL intervals done at alternating visits with MFM, reviewed these recent normal results on 19, last FL assessment  Done  and normal.   - Growth US done 19 with MFM, f/u in 5 weeks  - Flu shot given  - Tdap given  - Expecting baby boy  - Wet prep today to evaluate her vaginal discharge--> + for clue cells; will treat with metronidazole x7d    Gestational diabetes, diet controlled. Failed 3hr testing. History of GDM x2, diet controlled previously.  She had 6 and 7 pound babies.   -Currently all blood sugars are at goal.  -Reviewed 1 hour postprandial goals less than 140 and 2-hour less than 120.  -Check blood glucose 4X per day, with daily ketones  -Continue GDM meal plan, 3 meals with snacks.  -Follow-up with diabetic educator/NP as scheduled     Placenta previa- now low lying.   Noted on u/s from . Intermittent spotting since 9/10 as a result of low lying placenta until about 23 weeks, then no further spotting/bleeding. Was on pelvic rest.    - Recent ultrasound  shows leading edge of placenta is 1.8cm from internal os. The placenta is low-lying (marginal venous sinus at the leading edge of placenta measure 1.8 cm from the internal os).  - Repeat u/s as scheduled with  MFM to confirm growth and placental location.      Sjogren's syndrome +SSA/SSB in pregnancy.  Stable.  Followed by rheumatology, Dr. Neil.  - Continues on Plaquenil twice daily and eye drops    Hepatitis, autoimmune (H) on high risk medication;  Stable. Dx 2018, initially tapered down on azathrioprine when she conceived, resulting in presumed flare during 1st trimester based on elevated LFTs/bile acids. GI and MFM following, unlikely cholestasis at this point.  - F/u with GI around March   - Rare use of hydroxyzine   - no prednisone indicated at this time    Post delivery cares:  Notify NICU at delivery given maternal +SSA/SSB antibody and risk for fetal heart block and transient  lupus syndrome (rash, etc).   Baby will need EKG  Mom needs Hep B vaccine? (unclear immune status as was previously immune then not, ? If med related)    Discussed:  - Pre-pregnancy BMI 27.    - Recommended weight gain of  25-35 lbs for normal BMI.  - Influenza vaccine  Given  -  labor sx/signs  - Recommendations for breastfeeding given     She will follow-up with me in 2 weeks     Rebecca Mckenna MD      SUBJECTIVE:  Earnestine Guillen is a 39 y.o.   female who presents to clinic for a return OB visit.     Interval update:   In the last week she has noticed some white vaginal discharge and wondered if we could test for BV or yeast.  It is not particularly bothersome to her, no itching.  She has had no further vaginal bleeding since about 23 weeks gestation.  Her placenta previa is now considered low-lying.    She has only had about 5 pounds total weight gain, and recently dropped a little weight due to her healthier eating patterns with recent diagnosis of gestational diabetes.    She met with the gestational diabetes educator, last on :  Blood sugar record 12/10-:  FBS: 88,80,73,83,74,86,93,80,91  Post breakfast: 120,136,128,126,138,136,129,126,103  Post lunch: 84,125,87,132,131,127,119,133,136  Post dinner: 127,132,135,120,128,126,134,132    She is eating 3 meals a day, her  is going to a vegan  diet so she is adopted these changes as well.  See that note for details.    Regarding her hepatitis, no abdominal pain and itching has resolved.  No rash.  She is tolerating the Plaquenil for her Sjogren's.    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    SO is boyfriend. Her two older children (15yo, 20yo) have a different father than current SO.     Never smoker. No drugs/alcohol use.     Rebecca Mckenna MD       Active Ambulatory Problems     Diagnosis Date Noted     Abnormal LFTs s/t autoimmune hepatitis      Cervical cancer screening- ASCUS/HPV+, repeat 8/2019- delay due to pregnancy 07/21/2017     Hepatitis, autoimmune (H) 09/06/2018     Migraine with aura and without status migrainosus, not intractable 07/23/2018     Need for hepatitis B vaccination 06/25/2019     Positive EMELIA (antinuclear antibody) 08/13/2018     Supervision of high-risk pregnancy of elderly multigravida 06/25/2019     Thrombocytopenia affecting pregnancy (H) 08/20/2019     SS-A and SS-B antibody positive 09/30/2019     Sjogren's syndrome (H)iwth ++ SSA/SSB 09/30/2019     Placenta previa in second trimester 10/01/2019     Diet controlled gestational diabetes mellitus (GDM) in second trimester 12/20/2019     Resolved Ambulatory Problems     Diagnosis Date Noted     Anemia      Leukopenia      UTI (urinary tract infection) 02/18/2018     Acute pyelonephritis      SIRS (systemic inflammatory response syndrome) (H)      Hydronephrosis, unspecified hydronephrosis type      PID (acute pelvic inflammatory disease)      Past Medical History:   Diagnosis Date     Anemia      Autoimmune hepatitis (H) 2018     Cervicalgia      Migraine      Transaminitis        The following portions of the patient's history were reviewed and updated as appropriate: allergies, current  medications, past family history, past medical history, past social history, past surgical history and problem list.    Review of Systems  A 12 point comprehensive review of systems was negative except as noted.      PHYSICAL EXAM:  BP 99/68   Pulse 87   Wt 143 lb 12.8 oz (65.2 kg)   LMP 05/15/2019 (Exact Date)   SpO2 98%   BMI 28.08 kg/m     GENERAL: Pleasant pregnant female, alert, well groomed.  SKIN: Warm and dry, without lesions or rashes  EYES: PERRLA, EOM intact  LUNGS: Clear to auscultation.  BREAST: Symmetrical. No masses noted. No skin or nipple changes or axillary nodes.   HEART: RRR without murmur.  ABDOMEN: Soft without masses. Fundus at 30cm, FHTs 129  : external exam, physiologic discharge  MUSCULOSKELETAL: Full range of motion.  EXTREMITIES: No edema. No significant varicosities.   Skin: no rash      Rebecca Mckenna MD

## 2021-06-05 NOTE — TELEPHONE ENCOUNTER
RN Assessment/Reason for Call:   Okay to leave Detailed Message  Earnestine calling in, had baby 1/11/2020  Swelling in legs.new; 143# was 145# with baby; 126#  Knee down to feet; both legs.    RN Action/Disposition:  Protocol recommends see Dr in 24 hrs.  Offered WIC; prefers appt.  Appt Dr Marshall 1/15/2020 12:40p  Call back if worse symptoms  Discussed home care measures.  Agrees to plan.     Colleen Hurley, RN    [facility] Triage/med refill  1/14/2020  3:45 PM        Reason for Disposition    MODERATE leg swelling (e.g., more than just ankles, below knees)    Protocols used: PREGNANCY - LEG SWELLING AND EDEMA-A-AH

## 2021-06-05 NOTE — TELEPHONE ENCOUNTER
Telephone call:    Paged by answering service, pt is a 40yo,  at 34w2d, calls reporting leaking since 04:00, clear fluid. Had to change her underwear 3 times. Normal fetal movements. Denies leaking, bleeding. Some cramping. Recommended eval at [facility] now to evaluate for pre-term ROM. Pt agrees and will present to [facility].

## 2021-06-05 NOTE — PROGRESS NOTES
Assessment:        Earnestine Guillen is a 39 y.o. female here for postpartum exam at 4 days postpartum.  1. Leg swelling  Ambulatory referral for Orthotics / Prosthetics - [place]    INR    APTT(PTT)    HM2(CBC w/o Differential)    Protein/Creatinine Ratio, Urine Random    Uric Acid    Comprehensive Metabolic Panel    CANCELED: Creatinine   2. Routine postpartum follow-up  Ambulatory referral for Orthotics / Prosthetics - [place]    INR    APTT(PTT)    HM2(CBC w/o Differential)    Protein/Creatinine Ratio, Urine Random    Uric Acid    Comprehensive Metabolic Panel    CANCELED: Creatinine   . .    Leg swelling at 4 days post partum. She was given frequent IV fluid boluses during her labor for fetal heart rate changes.  She was additionally on Pitocin postpartum for prevention of postpartum hemorrhage.  Both of these can increase fluids.  Blood pressure yesterday was 135/85 at home and today it is 120/80 in clinic. We will check HELLP labs but very low suspicion for any alarming causes for her BLLE swelling. No cardiac sx to otherwise suggest heart failure. Given her other high risk clinical conditions (autoimmune hepatitis, GDM, Sjogrens), will also check CBC, CMP.         Subjective:       Earnestine Guillen is a 39 y.o. female who presents for a postpartum visit. She is 4 days postpartum following a spontaneous vaginal delivery. I have fully reviewed the prenatal and intrapartum course as I was the admitting and delivering physician. The delivery was at 34w3d gestational weeks due to PPROM. Outcome: spontaneous vaginal delivery.  GBS += treated x 4 with PCN; GDM- well controlled; hx of Sjogren's disease and autoimmune hepatitis.     Postpartum course has been complicated by LE swelling which began yesterday.  Checked BP at home was 135/85 yesterday. She has been boarding/rooming in the hospital as her baby is in the NICU still.   BP this AM at the hospital was 125/82. Denies headaches, RUQ abdominal pain, vision  "change. Denies shortness of breath, dyspnea on exertion, chest pain or palpitations. No orthopnea.          Baby's course is high risk, but going as been going as predicted for 34weeker with no resp support or IV therapy required- currently still in the NICU for feeding/growing . Baby is feeding by breast.  Currently bleeding  thin lochia. Bowel function is normal. Bladder function is normal. Patient has not resumed intercourse. Contraception method is none. Postpartum depression screening score: 0     The following portions of the patient's history were reviewed and updated as appropriate: allergies, current medications and problem list    Review of Systems  General:  Denies problem  Eyes: Denies problem  Ears/Nose/Throat: Denies problem  Cardiovascular: Denies problem  Respiratory:  Denies problem  Gastrointestinal:  Denies problem, Genitourinary: Denies problem  Musculoskeletal:  Denies problem  Skin: Denies problem  Neurologic: Denies problem  Psychiatric: Denies problem  Endocrine: Denies problem  Heme/Lymphatic: Denies problem   Allergic/Immunologic: Denies problem          Objective:         Vitals:    01/15/20 1007   BP: 120/80   Pulse: 72   Weight: 141 lb 3.2 oz (64 kg)   Height: 5' 1\" (1.549 m)       Physical Exam:  General Appearance: Alert, cooperative, no distress, appears stated age  Head: Normocephalic, without obvious abnormality, atraumatic  Eyes: PERRL, conjunctiva/corneas clear, EOM's intact  Ears: Normal TM's and external ear canals, both ears  Nose: Nares normal, septum midline,mucosa normal, no drainage  Throat: Lips, mucosa, and tongue normal; teeth and gums normal  Neck: Supple, symmetrical, trachea midline, no adenopathy;  thyroid: not enlarged, symmetric, no tenderness/mass/nodules; no carotid bruit or JVD  Back: Symmetric, no curvature, ROM normal, no CVA tenderness  Lungs: Clear to auscultation bilaterally, respirations unlabored  Breasts: No breast masses, tenderness, asymmetry, or " nipple discharge.  Heart: Regular rate and rhythm, S1 and S2 normal, no murmur, rub, or gallop, Nonpitting edema of BLLE is mild   Abdomen: Soft, non-tender, bowel sounds active all four quadrants,  no masses, no organomegaly  Extremities: Extremities normal, atraumatic, no cyanosis or edema  Skin: Skin color, texture, turgor normal, no rashes or lesions  Neurologic: Normal

## 2021-06-05 NOTE — TELEPHONE ENCOUNTER
Can pt see me tomorrow 1/15 instead around 9:40am? (ok to double book the 10am slot)- also ok to double book over the 10:40 slot if she has a preferred morning time other than 940. Please also advise her to go to Labor & Delivery unit for evaluation if she develops headache, vision change, Right upper abdominal pain, or shortness of breath or chest pain. If she has access to a blood pressure cuff, please check BP and go to L&D if BP is >140/90. Otherwise ok to wait until clinic tomorrow. If she has compression stockings to wear these and can elevate legs until then.

## 2021-06-05 NOTE — TELEPHONE ENCOUNTER
RN cannot approve Refill Request    RN can NOT refill this medication Protocol failed and NO refill given.      Alma Rosa Storey, [facility] Triage/Med Refill 1/8/2020    Requested Prescriptions   Pending Prescriptions Disp Refills     ACCU-CHEK FASTCLIX LANCET DRUM 102 each 0     Sig: TO TEST BLOOD SUGAR 4X/DAY.       Diabetic Supplies Refill Protocol Failed - 1/7/2020 12:16 PM        Failed - Visit with PCP or prescribing provider visit in last 6 months     Last office visit with prescriber/PCP: Visit date not found OR same dept: Visit date not found OR same specialty: Visit date not found  Last physical: Visit date not found Last MTM visit: Visit date not found   Next visit within 3 mo: Visit date not found  Next physical within 3 mo: Visit date not found  Prescriber OR PCP: Rebecca Mckenna MD  Last diagnosis associated with med order: 1. Diet controlled gestational diabetes mellitus (GDM) in second trimester  - ACCU-CHEK FASTCLIX LANCET DRUM; TO TEST BLOOD SUGAR 4X/DAY.  Dispense: 102 each; Refill: 0    If protocol passes may refill for 12 months if within 3 months of last provider visit (or a total of 15 months).             Failed - A1C in last 6 months     No results found for: HGBA1C

## 2021-06-05 NOTE — TELEPHONE ENCOUNTER
Reached out to patient and relayed the below message. Patient agreed to come in tomorrow at 9:40 am. Patient also took her blood pressure while we were on the phone, and her blood pressure was 135/85. No additional questions at this time.   Valerie Orozco

## 2021-06-06 NOTE — TELEPHONE ENCOUNTER
Reached out to patient and assisted with scheduling an appointment. Patient stated she is feel well, and does not have any concerns at this time. Valerie Orozco

## 2021-06-06 NOTE — TELEPHONE ENCOUNTER
Patient Returning Call  Reason for call:  Returning call to be scheduled   Information relayed to patient:  Needs to schedule 6/8 week post partum, however Dr. Mckenna does not have an opening within that time frame, please schedule    Patient has additional questions:  Yes  If YES, what are your questions/concerns:  Needs a 6-8 week post partum, 8 weeks is 3/7/20  Okay to leave a detailed message?: Yes

## 2021-06-16 PROBLEM — Z23 NEED FOR HEPATITIS B VACCINATION: Status: ACTIVE | Noted: 2019-06-25

## 2021-06-16 PROBLEM — Z12.4 CERVICAL CANCER SCREENING: Status: ACTIVE | Noted: 2017-07-21

## 2021-06-16 PROBLEM — O24.410 DIET CONTROLLED GESTATIONAL DIABETES MELLITUS (GDM) IN SECOND TRIMESTER: Status: ACTIVE | Noted: 2019-12-20

## 2021-06-16 PROBLEM — R76.8 POSITIVE ANA (ANTINUCLEAR ANTIBODY): Status: ACTIVE | Noted: 2018-08-13

## 2021-06-16 PROBLEM — O42.919 PRETERM PREMATURE RUPTURE OF MEMBRANES (PPROM) WITH UNKNOWN ONSET OF LABOR: Status: ACTIVE | Noted: 2020-01-10

## 2021-06-16 PROBLEM — R76.8 SS-A ANTIBODY POSITIVE: Status: ACTIVE | Noted: 2019-09-30

## 2021-06-16 NOTE — TELEPHONE ENCOUNTER
Telephone Encounter by Valerie Orozco CMA at 2/28/2020  1:07 PM     Author: Valerie Orozco CMA Service: -- Author Type: Certified Medical Assistant    Filed: 2/28/2020  1:08 PM Encounter Date: 2/28/2020 Status: Signed    : Valerie Orozco CMA (Certified Medical Assistant)       Reached out to patient and left a message to return phone call. Please see the below message, and assist patient with scheduling. Thank you, Rebecca Heaton MD P Svendsen, Jennifer Care Team Pool             Please help schedule pt for 6-8week post partum check up.   Thanks,   Rebecca Mckenna MD

## 2021-06-19 NOTE — LETTER
Letter by Rebecca Mckenna MD at      Author: Rebecca Mckenna MD Service: -- Author Type: --    Filed:  Encounter Date: 9/30/2019 Status: Signed         September 30, 2019     Patient: Earnestine Guillen   YOB: 1980   Date of Visit: 9/30/2019       To Whom It May Concern:    It is my medical opinion that Earnestine Guillen may return to light duty immediately with the following restrictions: 25 lb lifting/pushing restrictions. She should also have a chair available for breaks. Recommend 15min breaks every 2-3 hours if able. Thanks.     If you have any questions or concerns, please don't hesitate to call.    Sincerely,        Electronically signed by Rebecca Mckenna MD

## 2021-06-20 NOTE — LETTER
Letter by Rebecca Mckenna MD at      Author: Rebecca Mckenna MD Service: -- Author Type: --    Filed:  Encounter Date: 3/18/2020 Status: (Other)         March 18, 2020     Patient: Earnestine Guillen   YOB: 1980   Date of Visit: 3/18/2020       To Whom It May Concern:    It is my medical opinion that Earnestine Guillen has medical conditions which make it important to consider caution around people who may put her at risk with the current coronavirus outbreak. Please consider ability for Earnestine to work remotely if possible, or to wear a mask & gloves if remote work is not an option.     If you have any questions or concerns, please don't hesitate to call.    Sincerely,        Electronically signed by Rebecca Mckenna MD

## 2021-06-20 NOTE — LETTER
Letter by Hermelinda Mitcehll LPN at      Author: Hermelinda Mitchell LPN Service: -- Author Type: --    Filed:  Encounter Date: 3/16/2020 Status: (Other)         March 23, 2020     Patient: Earnestine Guillen   YOB: 1980   Date of Visit: 3/16/2020       To Whom It May Concern:    It is my medical opinion that Earnestine Guillen has underlying medical conditions including autoimmune hepatitis which was diagnosed around 8/2018. She is on immunosuppresant medciations for this making her high risk for susceptibility to Coronavirus. I advise that she stay home to limit her risk with regard to coronavirus.    If you have any questions or concerns, please don't hesitate to call.    Sincerely,        Electronically signed by Hermelinda Mitchell LPN

## 2021-07-03 NOTE — ADDENDUM NOTE
Addendum Note by Rebecca Mckenna MD at 12/20/2019 11:40 AM     Author: Rebecca Mckenna MD Service: -- Author Type: Physician    Filed: 12/20/2019  1:11 PM Encounter Date: 12/20/2019 Status: Signed    : Rebecca Mckenna MD (Physician)    Addended by: REBECCA MCKENNA on: 12/20/2019 01:11 PM        Modules accepted: Orders

## 2021-07-03 NOTE — ADDENDUM NOTE
Addendum Note by Rebecca Mckenna MD at 12/6/2019  1:40 PM     Author: Rebecca Mckenna MD Service: -- Author Type: Physician    Filed: 12/6/2019  4:45 PM Encounter Date: 12/6/2019 Status: Signed    : Rebecca Mckenna MD (Physician)    Addended by: REBECCA MCKENNA on: 12/6/2019 04:45 PM        Modules accepted: Orders

## 2021-07-03 NOTE — ADDENDUM NOTE
Addendum Note by Rebecca Mckenna MD at 11/6/2019  8:00 AM     Author: Rebecca Mckenna MD Service: -- Author Type: Physician    Filed: 11/6/2019  9:45 AM Encounter Date: 11/6/2019 Status: Signed    : Rebecca Mckenna MD (Physician)    Addended by: REBECCA MCKENNA on: 11/6/2019 09:45 AM        Modules accepted: Orders

## 2021-07-03 NOTE — ADDENDUM NOTE
Addendum Note by Nan Lozano RT (R) at 11/6/2019  8:00 AM     Author: Nan Lozano RT (R) Service: -- Author Type: Radiologic Technologist    Filed: 11/6/2019  9:10 AM Encounter Date: 11/6/2019 Status: Signed    : Nan Lozano RT (R) (Radiologic Technologist)    Addended by: NAN LOZANO on: 11/6/2019 09:10 AM        Modules accepted: Orders

## 2021-07-14 PROBLEM — O99.119 THROMBOCYTOPENIA AFFECTING PREGNANCY (H): Chronic | Status: RESOLVED | Noted: 2019-08-20 | Resolved: 2019-12-30

## 2021-07-14 PROBLEM — O44.02 PLACENTA PREVIA IN SECOND TRIMESTER: Status: RESOLVED | Noted: 2019-10-01 | Resolved: 2020-01-10

## 2021-07-14 PROBLEM — D69.6 THROMBOCYTOPENIA AFFECTING PREGNANCY (H): Chronic | Status: RESOLVED | Noted: 2019-08-20 | Resolved: 2019-12-30

## 2021-07-14 PROBLEM — O09.529 SUPERVISION OF HIGH-RISK PREGNANCY OF ELDERLY MULTIGRAVIDA: Chronic | Status: RESOLVED | Noted: 2019-06-25 | Resolved: 2020-03-23

## 2021-07-14 PROBLEM — O09.523 MULTIGRAVIDA OF ADVANCED MATERNAL AGE IN THIRD TRIMESTER: Status: RESOLVED | Noted: 2020-01-10 | Resolved: 2020-03-23

## 2021-07-14 PROBLEM — Z34.90 PREGNANT: Status: RESOLVED | Noted: 2020-01-11 | Resolved: 2020-03-23

## 2021-08-15 ENCOUNTER — HEALTH MAINTENANCE LETTER (OUTPATIENT)
Age: 41
End: 2021-08-15

## 2021-09-23 ENCOUNTER — TELEPHONE (OUTPATIENT)
Dept: FAMILY MEDICINE | Facility: CLINIC | Age: 41
End: 2021-09-23

## 2021-09-23 NOTE — TELEPHONE ENCOUNTER
Pt's son was seen for video visit today regarding concern for COVID.   Her  tested positive; Earnestine's result came back negative but they all have sx c/w COVID so would like to treat conservatively as her test being false negative result. Her sx started Monday 9/20/2021. Needs work note.    She had actually gotten her third dose of the vaccine on 9/7 as she has immunocompromising medications for her autoimmune condition.

## 2021-09-23 NOTE — LETTER
September 23, 2021      Earnestine Guillen  [address] [address]        To Whom It May Concern:    Earnestine Guillen  was seen on 9/23/2021.  Earnestine had a recent negative COVID test done on 9/20/2021  however another family member tested positive and they all have similar symptoms. For this reason, I have advised she complete the 10 day isolation which will allow her to return to work on Thursday 9/30/2021 which will be 10 days after her symptoms started.       Sincerely,        Rebecca Mckenna MD

## 2021-09-29 ENCOUNTER — MEDICAL CORRESPONDENCE (OUTPATIENT)
Dept: HEALTH INFORMATION MANAGEMENT | Facility: CLINIC | Age: 41
End: 2021-09-29
Payer: COMMERCIAL

## 2021-09-29 ENCOUNTER — TELEPHONE (OUTPATIENT)
Dept: FAMILY MEDICINE | Facility: CLINIC | Age: 41
End: 2021-09-29
Payer: COMMERCIAL

## 2021-09-29 NOTE — TELEPHONE ENCOUNTER
FORM DROPPED OFF AT , PUT IN CMT FOLDER AND ROUTED TO  CORE    LAST OFFICE VISIT-01/15/2020    CALL [facility] @ [phone]    SEE NOTE ATTACHED-PT NEEDS LA PAPERWORK FILLED OUT FROM BEING OFF WORK FOR COVID.

## 2021-09-30 NOTE — TELEPHONE ENCOUNTER
Form prepped and in inbasket at [facility] desk.  Hermelinda Mitchell LPN

## 2021-10-10 ENCOUNTER — HEALTH MAINTENANCE LETTER (OUTPATIENT)
Age: 41
End: 2021-10-10

## 2021-11-01 ENCOUNTER — OFFICE VISIT (OUTPATIENT)
Dept: FAMILY MEDICINE | Facility: CLINIC | Age: 41
End: 2021-11-01
Payer: COMMERCIAL

## 2021-11-01 VITALS
BODY MASS INDEX: 24.56 KG/M2 | DIASTOLIC BLOOD PRESSURE: 92 MMHG | HEART RATE: 90 BPM | TEMPERATURE: 99.1 F | SYSTOLIC BLOOD PRESSURE: 126 MMHG | OXYGEN SATURATION: 97 % | WEIGHT: 130 LBS

## 2021-11-01 DIAGNOSIS — R50.9 FEVER, UNSPECIFIED FEVER CAUSE: Primary | ICD-10-CM

## 2021-11-01 DIAGNOSIS — J02.0 STREPTOCOCCAL PHARYNGITIS: ICD-10-CM

## 2021-11-01 LAB
DEPRECATED S PYO AG THROAT QL EIA: POSITIVE
FLUAV AG SPEC QL IA: NEGATIVE
FLUBV AG SPEC QL IA: NEGATIVE

## 2021-11-01 PROCEDURE — U0003 INFECTIOUS AGENT DETECTION BY NUCLEIC ACID (DNA OR RNA); SEVERE ACUTE RESPIRATORY SYNDROME CORONAVIRUS 2 (SARS-COV-2) (CORONAVIRUS DISEASE [COVID-19]), AMPLIFIED PROBE TECHNIQUE, MAKING USE OF HIGH THROUGHPUT TECHNOLOGIES AS DESCRIBED BY CMS-2020-01-R: HCPCS | Performed by: FAMILY MEDICINE

## 2021-11-01 PROCEDURE — U0005 INFEC AGEN DETEC AMPLI PROBE: HCPCS | Performed by: FAMILY MEDICINE

## 2021-11-01 PROCEDURE — 99214 OFFICE O/P EST MOD 30 MIN: CPT | Performed by: FAMILY MEDICINE

## 2021-11-01 PROCEDURE — 87880 STREP A ASSAY W/OPTIC: CPT | Performed by: FAMILY MEDICINE

## 2021-11-01 PROCEDURE — 87804 INFLUENZA ASSAY W/OPTIC: CPT | Performed by: FAMILY MEDICINE

## 2021-11-01 RX ORDER — PENICILLIN V POTASSIUM 500 MG/1
500 TABLET, FILM COATED ORAL 2 TIMES DAILY
Qty: 20 TABLET | Refills: 0 | Status: SHIPPED | OUTPATIENT
Start: 2021-11-01 | End: 2021-11-22

## 2021-11-01 NOTE — PROGRESS NOTES
ASSESSMENT/PLAN:  Earnestine was seen today for otalgia.    Diagnoses and all orders for this visit:    Fever, unspecified fever cause  -     Streptococcus A Rapid Scr w Reflx to PCR - Lab Collect; Future  -     Influenza A & B Antigen - Clinic Collect  -     Symptomatic COVID-19 Virus (Coronavirus) by PCR; Future  -     Streptococcus A Rapid Scr w Reflx to PCR - Lab Collect  -     Symptomatic COVID-19 Virus (Coronavirus) by PCR Nose    Positive strep  [facility]  Supportive cares    SUBJECTIVE:    Earnestine Guillen is a 40 year old female who came in today     Pleasant 40-year-old female who comes in 2 days ago she noted a dry and sore throat.  This is accompanied by a dry cough.  She had noted a fever of 102.  No rash.  No vomiting.  No diarrhea.  No loss of taste or smell.  She is fully vaccinated.  No known exposure to anyone who tested positive for Covid recently.  She has been taking Tylenol and DayQuil for her symptoms    Review of Systems (except those mentioned above)  Constitutional: Negative.   HENT: Negative.   Eyes: Negative.   Respiratory: Negative.   Cardiovascular: Negative.   Gastrointestinal: Negative.   Endocrine: Negative.   Genitourinary: Negative.   Musculoskeletal: Negative.   Skin: Negative.   Allergic/Immunologic: Negative.   Neurological: Negative.   Hematological: Negative.   Psychiatric/Behavioral: Negative.     Patient Active Problem List    Diagnosis Date Noted      (normal spontaneous vaginal delivery) 2020     Priority: Medium      premature rupture of membranes (PPROM) at 34w2d 01/10/2020     Priority: Medium     Abnormal LFTs s/t autoimmune hepatitis      Priority: Medium     Diet controlled gestational diabetes mellitus (GDM) in second trimester 2019     Priority: Medium     SS-A and SS-B antibody positive 2019     Priority: Medium     Sjogren's syndrome (H)iwth ++ SSA/SSB 2019     Priority: Medium     Dx based on presence of SSA/SSB bella with dry eyes and  dry mouth         Need for hepatitis B vaccination 06/25/2019     Priority: Medium     Hepatitis, autoimmune (H) 09/06/2018     Priority: Medium     Positive EMELIA (antinuclear antibody) 08/13/2018     Priority: Medium     Migraine with aura and without status migrainosus, not intractable 07/23/2018     Priority: Medium     No immetrix in pg         Cervical cancer screening- ASCUS/HPV+, repeat 8/2019- delay due to pregnancy 07/21/2017     Priority: Medium     Overview:   From visit on 7/17/17:  History of abnormal pap tests?  No  2017  NILM, neg HPV   2018 ASCUS, Positive for High Risk HPV types other than 16 or 18   37 y.o.  [place] normal  Plan PAP/HPV in 1 year. Crystal Wright MD         Allergies   Allergen Reactions     Methocarbamol Rash     Could be an allergy with this or propranolol     Propranolol Rash     Could be an allergy with this or methocarbamol     Venlafaxine Rash     Current Outpatient Medications   Medication Sig Dispense Refill     penicillin V (VEETID) 500 MG tablet Take 1 tablet (500 mg) by mouth 2 times daily 20 tablet 0     hydrOXYzine (VISTARIL) 50 MG capsule Take 1-2 capsules ( mg) by mouth 3 times daily as needed for itching (Can take up to 100mg at night) 30 capsule 0     Past Medical History:   Diagnosis Date     Acute pyelonephritis      Anemia      Autoimmune hepatitis (H) 2018     Cervicalgia      Diabetes mellitus (H)     DGDM     Migraine      PID (acute pelvic inflammatory disease) 2018     Transaminitis      Past Surgical History:   Procedure Laterality Date     INJECTION ANESTHETIC AGENT TO CERVICAL PLEXUS       Social History     Socioeconomic History     Marital status: Single     Spouse name: Not on file     Number of children: Not on file     Years of education: Not on file     Highest education level: Not on file   Occupational History     Not on file   Tobacco Use     Smoking status: Not on file   Substance and Sexual Activity     Alcohol use: Not on file     Drug  use: Not on file     Sexual activity: Not on file   Other Topics Concern     Not on file   Social History Narrative     Not on file     Social Determinants of Health     Financial Resource Strain:      Difficulty of Paying Living Expenses:    Food Insecurity:      Worried About Running Out of Food in the Last Year:      Ran Out of Food in the Last Year:    Transportation Needs:      Lack of Transportation (Medical):      Lack of Transportation (Non-Medical):    Physical Activity:      Days of Exercise per Week:      Minutes of Exercise per Session:    Stress:      Feeling of Stress :    Social Connections:      Frequency of Communication with Friends and Family:      Frequency of Social Gatherings with Friends and Family:      Attends Jainism Services:      Active Member of Clubs or Organizations:      Attends Club or Organization Meetings:      Marital Status:    Intimate Partner Violence:      Fear of Current or Ex-Partner:      Emotionally Abused:      Physically Abused:      Sexually Abused:      No family history on file.      OBJECTIVE:    Vitals:    11/01/21 0939   BP: (!) 126/92   Pulse: 90   Temp: 99.1  F (37.3  C)   TempSrc: Oral   SpO2: 97%   Weight: 59 kg (130 lb)     Body mass index is 24.56 kg/m .    Physical Exam:  Constitutional: Patient was oriented to person, place, and time. Patient appeared well-developed and well-nourished. No distress.   Head: Normocephalic and atraumatic.   Right Ear: External ear normal. Normal TM  Left Ear: External ear normal. Normal TM  Nose: Nose normal.   Mouth/Throat: Oropharynx was erythematous. No oropharyngeal exudate.   Eyes: Conjunctivae and EOM were normal. Pupils were equal, round, and reactive to light. Right eye exhibited no discharge. Left eye exhibited no discharge. No scleral icterus.   Neck: Neck supple. No JVD present. No tracheal deviation present. No thyromegaly present.   Lymphadenopathy:  Patient has no cervical adenopathy.   Cardiovascular: Normal  rate, regular rhythm, normal heart sounds and intact distal pulses. No murmur heard.   Pulmonary/Chest: Effort normal and breath sounds normal. No stridor. No respiratory distress. Patient had no wheezes, no rales, exhibits no tenderness.   Skin: Skin was warm and dry. No rash noted. Patient was not diaphoretic. No erythema. No pallor.       Results for orders placed or performed in visit on 11/01/21   Influenza A & B Antigen - Clinic Collect     Status: Normal    Specimen: Nose; Swab   Result Value Ref Range    Influenza A antigen Negative Negative    Influenza B antigen Negative Negative    Narrative    Test results must be correlated with clinical data. If necessary, results should be confirmed by a molecular assay or viral culture.   Streptococcus A Rapid Scr w Reflx to PCR - Lab Collect     Status: Abnormal    Specimen: Throat; Swab   Result Value Ref Range    Group A Strep antigen Positive (A) Negative

## 2021-11-02 LAB — SARS-COV-2 RNA RESP QL NAA+PROBE: NEGATIVE

## 2021-11-22 ENCOUNTER — OFFICE VISIT (OUTPATIENT)
Dept: OBGYN | Facility: CLINIC | Age: 41
End: 2021-11-22
Payer: COMMERCIAL

## 2021-11-22 VITALS
SYSTOLIC BLOOD PRESSURE: 127 MMHG | BODY MASS INDEX: 26.58 KG/M2 | HEART RATE: 81 BPM | HEIGHT: 60 IN | WEIGHT: 135.4 LBS | DIASTOLIC BLOOD PRESSURE: 86 MMHG

## 2021-11-22 DIAGNOSIS — Z23 NEED FOR PROPHYLACTIC VACCINATION AND INOCULATION AGAINST INFLUENZA: ICD-10-CM

## 2021-11-22 DIAGNOSIS — O03.9 SAB (SPONTANEOUS ABORTION): Primary | ICD-10-CM

## 2021-11-22 LAB — B-HCG SERPL-ACNC: 2 IU/L (ref 0–5)

## 2021-11-22 PROCEDURE — 90686 IIV4 VACC NO PRSV 0.5 ML IM: CPT | Performed by: OBSTETRICS & GYNECOLOGY

## 2021-11-22 PROCEDURE — 36415 COLL VENOUS BLD VENIPUNCTURE: CPT | Performed by: OBSTETRICS & GYNECOLOGY

## 2021-11-22 PROCEDURE — 84702 CHORIONIC GONADOTROPIN TEST: CPT | Performed by: OBSTETRICS & GYNECOLOGY

## 2021-11-22 PROCEDURE — 90471 IMMUNIZATION ADMIN: CPT | Performed by: OBSTETRICS & GYNECOLOGY

## 2021-11-22 PROCEDURE — 99203 OFFICE O/P NEW LOW 30 MIN: CPT | Mod: 25 | Performed by: OBSTETRICS & GYNECOLOGY

## 2021-11-22 ASSESSMENT — MIFFLIN-ST. JEOR: SCORE: 1205.67

## 2021-11-22 NOTE — PROGRESS NOTES
GYN Progress Note     CC: Bleeding in early pregnancy     HISTORY OF PRESENT ILLNESS:  Earnestine Guillen is a 40 year old  at 6w0d by LMP who presents for follow up for vaginal bleeding in early pregnancy. She reports having a positive pregnancy test about two weeks ago but then presented to Urgent Care on 2021 where a HCG quant was done and found to be 31, she has not had any repeat HCG levels since that time and is confused about what is happening with the pregnancy. She reports that since her visit on , her bleeding has stopped and she denies any abdominal pain.     Her PMH is significant for Sjogren's with +SSA/SSB antibodies as well as autoimmune hepatitis well controled on Azathioprine and Plaquenil which she had continued during her last pregnancy. This was a planned pregnancy although her periods have become irregular since the birth of her last baby just over a year ago.     Patient's last menstrual period was 10/11/2021 (exact date).      OB HISTORY:  OB History    Para Term  AB Living   4 3 2 1 0 3   SAB IAB Ectopic Multiple Live Births   0 0 0 0 3      # Outcome Date GA Lbr Deon/2nd Weight Sex Delivery Anes PTL Lv   4 Current            3  20 36w0d   M    JENN      Birth Comments: Gestational diabetes, PROM   2 Term         JENN   1 Term         JENN            GYN HISTORY:  She reports irregular menses, Patient's last menstrual period was 10/11/2021 (exact date).  Denies hx of abnormal pap smears or STIs      PAST MEDICAL HISTORY:  Past Medical History:   Diagnosis Date     Acute pyelonephritis      Anemia      Autoimmune hepatitis (H) 2018     Cervicalgia      Diabetes mellitus (H)     DGDM     Migraine      PID (acute pelvic inflammatory disease) 2018     Transaminitis           PAST SURGICAL HISTORY:  Past Surgical History:   Procedure Laterality Date     INJECTION ANESTHETIC AGENT TO CERVICAL PLEXUS          CURRENT MEDICATIONS:   Current  Outpatient Medications   Medication Sig Dispense Refill     hydrOXYzine (VISTARIL) 50 MG capsule Take 1-2 capsules ( mg) by mouth 3 times daily as needed for itching (Can take up to 100mg at night) 30 capsule 0   Also taking Plaquenil and Azathioprine       ALLERGIES:  Methocarbamol, Propranolol, and Venlafaxine         SOCIAL HISTORY:  Social History     Tobacco Use     Smoking status: Not on file     Smokeless tobacco: Not on file   Substance Use Topics     Alcohol use: Not on file     Drug use: Not on file          FAMILY HISTORY:  No family history on file.         REVIEW OF SYSTEMS:  See HPI        PHYSICAL EXAMINATION:  VS:/86 (BP Location: Right arm, Patient Position: Sitting, Cuff Size: Adult Regular)   Pulse 81   Ht 1.524 m (5')   Wt 61.4 kg (135 lb 6.4 oz)   LMP 10/11/2021 (Exact Date)   BMI 26.44 kg/m    Body mass index is 26.44 kg/m .       General: Patient alert and oriented, no acute distress  CV: no peripheral edema or cyanosis  Resp: normal respiratory effort and equal lung expansion  Abdomen: non-tender to light and deep palpation, no masses, organomegaly or hernia  : Pelvic exam deferred   Ext: non-tender, no edema      Laboratory values:  Component      Latest Ref Rng & Units 2021   HCG Quantitative Serum      0 - 5 IU/L 2     Imaging findings:      ASSESSMENT:  Earnestine Guillen is a 40 year old  who presents for evaluation of bleeding in early pregnancy     PLAN:  (O03.9) SAB (spontaneous )  (primary encounter diagnosis)  Comment:  Discussed with patient that her HCG has decreased to a non-pregnant level which confirms SAB  Given that her bleeding has already stopped and HCG is negative, I do not expect that she will need any further treatment.   Discussed that she should call if she does not get a period within 4-5 weeks   Patient planning for pregnancy again in the future and declines contraception. We discussed overall risk of miscarriage of 15-20% with  chromosomal abnormalities being the most common etiology. We did discuss the increased risk of SAB associated with advanced maternal age.    -Rh+  -Continue prenatal vitamin, patient plans to continue current medications during future pregnancy   Plan: HCG quantitative pregnancy          (Z23) Need for prophylactic vaccination and inoculation against influenza  Plan: INFLUENZA VACCINE IM > 6 MONTHS VALENT IIV4         (AFLURIA/FLUZONE)          Dispo: RTC as needed     Keisha Tovar MD

## 2022-02-11 PROBLEM — Z00.00 ENCOUNTER FOR PREVENTIVE HEALTH EXAMINATION: Status: ACTIVE | Noted: 2022-02-11

## 2022-02-18 ENCOUNTER — APPOINTMENT (OUTPATIENT)
Dept: NEUROLOGY | Facility: CLINIC | Age: 42
End: 2022-02-18
Payer: MEDICAID

## 2022-02-18 VITALS
HEIGHT: 63 IN | BODY MASS INDEX: 24.45 KG/M2 | SYSTOLIC BLOOD PRESSURE: 110 MMHG | DIASTOLIC BLOOD PRESSURE: 80 MMHG | WEIGHT: 138 LBS

## 2022-02-18 DIAGNOSIS — Z78.9 OTHER SPECIFIED HEALTH STATUS: ICD-10-CM

## 2022-02-18 DIAGNOSIS — Z85.3 PERSONAL HISTORY OF MALIGNANT NEOPLASM OF BREAST: ICD-10-CM

## 2022-02-18 DIAGNOSIS — Z82.0 FAMILY HISTORY OF EPILEPSY AND OTHER DISEASES OF THE NERVOUS SYSTEM: ICD-10-CM

## 2022-02-18 PROCEDURE — 99204 OFFICE O/P NEW MOD 45 MIN: CPT

## 2022-02-18 NOTE — REASON FOR VISIT
[Consultation] : a consultation visit [[employer] ] : provided by [employer]   [FreeTextEntry1] : headache [TWNoteComboBox1] : [place]

## 2022-02-18 NOTE — HISTORY OF PRESENT ILLNESS
[FreeTextEntry1] : Initial office visit February 18, 2022:\par This is a 41-year-old woman who presents today for evaluation of headache. She's been having headaches since starting chemotherapy for breast cancer in 2017. The whole cranial headache which sometimes is bandlike. She has seen other neurologists in the past been on nortriptyline, she thinks 25 mg daily. This was helping with the headaches. She moved and no longer has someone to prescribe nortriptyline for her. The headaches returned and can be constant. She is not a headache for the last 3 days but prior to that she has had prolonged headaches. She she her today for neurologic evaluation and treatment.

## 2022-02-18 NOTE — ASSESSMENT
[FreeTextEntry1] : This is a 41-year-old woman with a likely tension-type headache. She has responded well to nortriptyline in the past she states. I will restart nortriptyline 25 mg at night. I have asked her try this for one month and call me if it is not helping he can increase it further. I will see her back in the office in 2 months, sooner should the need arise.

## 2022-02-18 NOTE — CONSULT LETTER
[Dear  ___] : Dear  [unfilled], [Consult Letter:] : I had the pleasure of evaluating your patient, [unfilled]. [Please see my note below.] : Please see my note below. [Consult Closing:] : Thank you very much for allowing me to participate in the care of this patient.  If you have any questions, please do not hesitate to contact me. [Sincerely,] : Sincerely, [FreeTextEntry3] : Vernon Orta M.D., Ph.D. DPN-N\par [facility]\par Neurology at [place]\par Medical Director of Stroke Services\par [facility]\par

## 2022-02-18 NOTE — PHYSICAL EXAM
[General Appearance - Alert] : alert [General Appearance - In No Acute Distress] : in no acute distress [General Appearance - Well Nourished] : well nourished [General Appearance - Well Developed] : well developed [Person] : oriented to person [Place] : oriented to place [Time] : oriented to time [Remote Intact] : remote memory intact [Registration Intact] : recent registration memory intact [Span Intact] : the attention span was normal [Concentration Intact] : normal concentrating ability [Visual Intact] : visual attention was ~T not ~L decreased [Repeating Phrases] : no difficulty repeating a phrase [Naming Objects] : no difficulty naming common objects [Fluency] : fluency intact [Comprehension] : comprehension intact [Current Events] : adequate knowledge of current events [Past History] : adequate knowledge of personal past history [Cranial Nerves Optic (II)] : visual acuity intact bilaterally,  visual fields full to confrontation, pupils equal round and reactive to light [Cranial Nerves Oculomotor (III)] : extraocular motion intact [Cranial Nerves Trigeminal (V)] : facial sensation intact symmetrically [Cranial Nerves Facial (VII)] : face symmetrical [Cranial Nerves Vestibulocochlear (VIII)] : hearing was intact bilaterally [Cranial Nerves Glossopharyngeal (IX)] : tongue and palate midline [Cranial Nerves Accessory (XI - Cranial And Spinal)] : head turning and shoulder shrug symmetric [Cranial Nerves Hypoglossal (XII)] : there was no tongue deviation with protrusion [Motor Tone] : muscle tone was normal in all four extremities [Motor Strength] : muscle strength was normal in all four extremities [Involuntary Movements] : no involuntary movements were seen [No Muscle Atrophy] : normal bulk in all four extremities [Paresis Pronator Drift Right-Sided] : no pronator drift on the right [Paresis Pronator Drift Left-Sided] : no pronator drift on the left [Motor Strength Upper Extremities Bilaterally] : strength was normal in both upper extremities [Motor Strength Lower Extremities Bilaterally] : strength was normal in both lower extremities [Sensation Tactile Decrease] : light touch was intact [Sensation Pain / Temperature Decrease] : pain and temperature was intact [Sensation Vibration Decrease] : vibration was intact [Proprioception] : proprioception was intact [Abnormal Walk] : normal gait [Balance] : balance was intact [Tremor] : no tremor present [Coordination - Dysmetria Impaired Finger-to-Nose Bilateral] : not present [2+] : Patella left 2+ [Sclera] : the sclera and conjunctiva were normal [PERRL With Normal Accommodation] : pupils were equal in size, round, reactive to light, with normal accommodation [Extraocular Movements] : extraocular movements were intact [No APD] : no afferent pupillary defect [No DONNIE] : no internuclear ophthalmoplegia [Full Visual Field] : full visual field

## 2022-02-28 ENCOUNTER — PRENATAL OFFICE VISIT (OUTPATIENT)
Dept: OBGYN | Facility: CLINIC | Age: 42
End: 2022-02-28
Payer: COMMERCIAL

## 2022-02-28 VITALS
WEIGHT: 137.1 LBS | HEART RATE: 69 BPM | BODY MASS INDEX: 26.92 KG/M2 | DIASTOLIC BLOOD PRESSURE: 79 MMHG | HEIGHT: 60 IN | SYSTOLIC BLOOD PRESSURE: 116 MMHG

## 2022-02-28 DIAGNOSIS — O20.9 FIRST TRIMESTER BLEEDING: Primary | ICD-10-CM

## 2022-02-28 LAB — B-HCG SERPL-ACNC: 962 IU/L (ref 0–5)

## 2022-02-28 PROCEDURE — 84702 CHORIONIC GONADOTROPIN TEST: CPT | Performed by: OBSTETRICS & GYNECOLOGY

## 2022-02-28 PROCEDURE — 36415 COLL VENOUS BLD VENIPUNCTURE: CPT | Performed by: OBSTETRICS & GYNECOLOGY

## 2022-02-28 PROCEDURE — 99213 OFFICE O/P EST LOW 20 MIN: CPT | Performed by: OBSTETRICS & GYNECOLOGY

## 2022-02-28 RX ORDER — PRENATAL VIT/IRON FUM/FOLIC AC 27MG-0.8MG
1 TABLET ORAL DAILY
COMMUNITY

## 2022-02-28 NOTE — PROGRESS NOTES
Assessment & Plan     First trimester bleeding  Recommend serial HCG. 8 w 3 d by LMP, but second cycle after SAB  Will use HCG result to determine when to do ultrasound.   Ultrasound ordered, will wait to schedule until HCG back  - HCG quantitative pregnancy; Standing  - HCG quantitative pregnancy    Review of the result(s) of each unique test - ultrasound, HCG  Ordering of each unique test         BMI:   Estimated body mass index is 26.78 kg/m  as calculated from the following:    Height as of this encounter: 1.524 m (5').    Weight as of this encounter: 62.2 kg (137 lb 1.6 oz).   Weight management plan: Discussed healthy diet and exercise guidelines        No follow-ups on file.    Veronica Betancourt MD  [facility] LILY Colby is a 41 year old who presents for the following health issues     HPI   Presents for evaluation o  Miscarriage in 11/2021  Period returned December.   Patient's last menstrual period was 01/10/2022 (exact date). GA 8 weeks 2 days  Positive pregnancy test 2/21/22 in a [facility].   On 2/25 started to have a little bit of blood on the tissue every time she wipes.   No cramping or pain.  Rh positive    Past Medical History:   Diagnosis Date     Acute pyelonephritis      Anemia      Autoimmune hepatitis (H) 2018     Cervicalgia      Diabetes mellitus (H)     DGDM     Migraine      PID (acute pelvic inflammatory disease) 2018     Transaminitis        Past Surgical History:   Procedure Laterality Date     INJECTION ANESTHETIC AGENT TO CERVICAL PLEXUS         No family history on file.    Social History     Socioeconomic History     Marital status: Single     Spouse name: Not on file     Number of children: Not on file     Years of education: Not on file     Highest education level: Not on file   Occupational History     Not on file   Tobacco Use     Smoking status: Not on file     Smokeless tobacco: Not on file   Substance and Sexual Activity      Alcohol use: Not on file     Drug use: Not on file     Sexual activity: Not on file   Other Topics Concern     Not on file   Social History Narrative     Not on file     Social Determinants of Health     Financial Resource Strain: Not on file   Food Insecurity: Not on file   Transportation Needs: Not on file   Physical Activity: Not on file   Stress: Not on file   Social Connections: Not on file   Intimate Partner Violence: Not on file   Housing Stability: Not on file       Current Outpatient Medications   Medication     hydrOXYzine (VISTARIL) 50 MG capsule     Prenatal Vit-Fe Fumarate-FA (PRENATAL MULTIVITAMIN W/IRON) 27-0.8 MG tablet     No current facility-administered medications for this visit.          Allergies   Allergen Reactions     Methocarbamol Rash     Could be an allergy with this or propranolol     Propranolol Rash     Could be an allergy with this or methocarbamol     Venlafaxine Rash         Review of Systems   Constitutional, HEENT, cardiovascular, pulmonary, gi and gu systems are negative, except as otherwise noted.      Objective    /79 (BP Location: Right arm, Patient Position: Sitting, Cuff Size: Adult Regular)   Pulse 69   Ht 1.524 m (5')   Wt 62.2 kg (137 lb 1.6 oz)   LMP 01/10/2022 (Exact Date)   BMI 26.78 kg/m    Body mass index is 26.78 kg/m .  Physical Exam   GENERAL: healthy, alert and no distress  PSYCH: mentation appears normal, affect normal/bright

## 2022-04-26 ENCOUNTER — APPOINTMENT (OUTPATIENT)
Dept: NEUROLOGY | Facility: CLINIC | Age: 42
End: 2022-04-26

## 2022-05-11 ENCOUNTER — OFFICE VISIT (OUTPATIENT)
Dept: FAMILY MEDICINE | Facility: CLINIC | Age: 42
End: 2022-05-11
Payer: COMMERCIAL

## 2022-05-11 VITALS
BODY MASS INDEX: 26.95 KG/M2 | HEART RATE: 61 BPM | DIASTOLIC BLOOD PRESSURE: 86 MMHG | SYSTOLIC BLOOD PRESSURE: 129 MMHG | TEMPERATURE: 98.3 F | OXYGEN SATURATION: 97 % | WEIGHT: 138 LBS | RESPIRATION RATE: 16 BRPM

## 2022-05-11 DIAGNOSIS — H57.12 PAIN OF LEFT EYE: ICD-10-CM

## 2022-05-11 DIAGNOSIS — M35.00 SJOGREN'S SYNDROME, WITH UNSPECIFIED ORGAN INVOLVEMENT (H): ICD-10-CM

## 2022-05-11 DIAGNOSIS — H57.89 RED EYE: Primary | ICD-10-CM

## 2022-05-11 PROCEDURE — 99213 OFFICE O/P EST LOW 20 MIN: CPT | Performed by: FAMILY MEDICINE

## 2022-05-12 NOTE — PROGRESS NOTES
Assessment/Plan:   Red eye  Pain of left eye  Sjogren's syndrome, with unspecified organ involvement (H)  New onset of painful red eye on the left. No known trauma. Mild itchiness yesterday of both eyes. Also with left headache. No corneal abrasion, visual acuity normal. Pain with movement. Has history of sjogren's and prior eye condition which sounds like iritis.   Consider iritis, infection, increased ocular pressure, subconjunctival hemorrhage (total eye).  I was able to get an appointment for her with [facility] to be seen urgently this afternoon and she will go directly there for further evaluation. Follow up as needed.   - Adult Eye Referral; Future    I discussed red flag symptoms, return precautions to clinic/ER and follow up care with patient/parent.  Expected clinical course, symptomatic cares advised. Questions answered. Patient/parent amenable with plan.    Go to [facility] now for evaluation of painful red eye    Subjective:     Earnestine Guillen is a 41 year old female with Sjogren's syndrome who presents for evaluation of a painful red eye. Yesterday both eyes were mildly itchy. She has since developed headache on the left side and behind the left eye along with increased redness and photosensitivity, blurriness and pain in the left eye.   No definite eye drainage. No trauma. No foreign body sensation in the eye. No trauma. She has not put in any drops.   No fever or new URI or ST. She had a sinus infection a month ago, symptoms may not be all gone. She had Covid in January.   No vertigo, no N/V/D. No rash on the face or scalp or near the eye. Not watering.   She experienced something similar about 10 or more years ago, was seen by [facility] (Dr Herrmann) and given an antibiotic and steroid drops she thinks. I cannot see records of that.      Allergies   Allergen Reactions     Methocarbamol Rash     Could be an allergy with this or propranolol     Propranolol Rash     Could be an allergy with  this or methocarbamol     Venlafaxine Rash     Current Outpatient Medications   Medication     hydrOXYzine (VISTARIL) 50 MG capsule     Prenatal Vit-Fe Fumarate-FA (PRENATAL MULTIVITAMIN W/IRON) 27-0.8 MG tablet     No current facility-administered medications for this visit.     Patient Active Problem List   Diagnosis     Abnormal LFTs s/t autoimmune hepatitis     Cervical cancer screening- ASCUS/HPV+, repeat 2019- delay due to pregnancy     Hepatitis, autoimmune (H)     Migraine with aura and without status migrainosus, not intractable     Need for hepatitis B vaccination     Positive EMELIA (antinuclear antibody)     SS-A and SS-B antibody positive     Sjogren's syndrome (H)iwth ++ SSA/SSB     Diet controlled gestational diabetes mellitus (GDM) in second trimester      premature rupture of membranes (PPROM) at 34w2d      (normal spontaneous vaginal delivery)       Objective:     /86   Pulse 61   Temp 98.3  F (36.8  C) (Oral)   Resp 16   Wt 62.6 kg (138 lb)   LMP 01/10/2022 (Exact Date)   SpO2 97%   Breastfeeding Unknown   BMI 26.95 kg/m      Physical  VA 20/20 right eye and left eye with glasses on  General Appearance: Alert, pleasant, no distress. AVSS  Head: Normocephalic, without obvious abnormality, atraumatic. CN 2-12 grossly intact.   Eyes: Conjunctiva right side is fairly normal. Extraocular movements are intact. PERRLA. Light sensitivity L>R. Mildly sensitive in the left eye with light shining in the right eye. Diffuse bright redness of the conjunctivitis left eye. No definite swelling. Mild pain with palpation of the eyeball. Pressure with movement. No purulent drainage.   Fluorescein stain left eye showed no definite corneal defect.   Nose: No significant congestion.  Neck: No adenopathy  Lungs:  respirations unlabored  Skin:  no rashes or lesions around the left face or scalp.   Psychiatric: Patient has a normal mood and affect.

## 2022-05-20 ENCOUNTER — TRANSFERRED RECORDS (OUTPATIENT)
Dept: HEALTH INFORMATION MANAGEMENT | Facility: CLINIC | Age: 42
End: 2022-05-20
Payer: COMMERCIAL

## 2022-06-06 ENCOUNTER — TRANSFERRED RECORDS (OUTPATIENT)
Dept: HEALTH INFORMATION MANAGEMENT | Facility: CLINIC | Age: 42
End: 2022-06-06
Payer: COMMERCIAL

## 2022-07-11 ENCOUNTER — TRANSFERRED RECORDS (OUTPATIENT)
Dept: HEALTH INFORMATION MANAGEMENT | Facility: CLINIC | Age: 42
End: 2022-07-11

## 2022-08-01 ENCOUNTER — OFFICE VISIT (OUTPATIENT)
Dept: FAMILY MEDICINE | Facility: CLINIC | Age: 42
End: 2022-08-01
Payer: COMMERCIAL

## 2022-08-01 VITALS
HEART RATE: 68 BPM | WEIGHT: 143.4 LBS | SYSTOLIC BLOOD PRESSURE: 120 MMHG | HEIGHT: 60 IN | BODY MASS INDEX: 28.16 KG/M2 | DIASTOLIC BLOOD PRESSURE: 76 MMHG

## 2022-08-01 DIAGNOSIS — Z00.00 ROUTINE GENERAL MEDICAL EXAMINATION AT A HEALTH CARE FACILITY: Primary | ICD-10-CM

## 2022-08-01 DIAGNOSIS — N96 HISTORY OF RECURRENT MISCARRIAGES: ICD-10-CM

## 2022-08-01 DIAGNOSIS — K75.4 HEPATITIS, AUTOIMMUNE (H): ICD-10-CM

## 2022-08-01 DIAGNOSIS — Z23 NEED FOR HEPATITIS B VACCINATION: ICD-10-CM

## 2022-08-01 DIAGNOSIS — Z86.32 HISTORY OF DIET CONTROLLED GESTATIONAL DIABETES MELLITUS (GDM): ICD-10-CM

## 2022-08-01 DIAGNOSIS — G43.109 MIGRAINE WITH AURA AND WITHOUT STATUS MIGRAINOSUS, NOT INTRACTABLE: ICD-10-CM

## 2022-08-01 DIAGNOSIS — M35.01 SJOGREN'S SYNDROME WITH KERATOCONJUNCTIVITIS SICCA (H): ICD-10-CM

## 2022-08-01 DIAGNOSIS — Z87.59 HISTORY OF PRETERM PREMATURE RUPTURE OF MEMBRANES (PPROM): ICD-10-CM

## 2022-08-01 DIAGNOSIS — R76.8 POSITIVE ANA (ANTINUCLEAR ANTIBODY): ICD-10-CM

## 2022-08-01 DIAGNOSIS — R76.8 SS-A ANTIBODY POSITIVE: ICD-10-CM

## 2022-08-01 DIAGNOSIS — Z12.4 SCREENING FOR CERVICAL CANCER: ICD-10-CM

## 2022-08-01 DIAGNOSIS — Z12.4 CERVICAL CANCER SCREENING: ICD-10-CM

## 2022-08-01 LAB
ALBUMIN SERPL BCG-MCNC: 4.4 G/DL (ref 3.5–5.2)
ALP SERPL-CCNC: 84 U/L (ref 35–104)
ALT SERPL W P-5'-P-CCNC: 21 U/L (ref 10–35)
ANION GAP SERPL CALCULATED.3IONS-SCNC: 13 MMOL/L (ref 7–15)
AST SERPL W P-5'-P-CCNC: 27 U/L (ref 10–35)
BILIRUB SERPL-MCNC: 0.4 MG/DL
BUN SERPL-MCNC: 11.9 MG/DL (ref 6–20)
CALCIUM SERPL-MCNC: 9.3 MG/DL (ref 8.6–10)
CHLORIDE SERPL-SCNC: 102 MMOL/L (ref 98–107)
CHOLEST SERPL-MCNC: 178 MG/DL
CREAT SERPL-MCNC: 0.48 MG/DL (ref 0.51–0.95)
DEPRECATED HCO3 PLAS-SCNC: 22 MMOL/L (ref 22–29)
ERYTHROCYTE [DISTWIDTH] IN BLOOD BY AUTOMATED COUNT: 12.2 % (ref 10–15)
GFR SERPL CREATININE-BSD FRML MDRD: >90 ML/MIN/1.73M2
GLUCOSE SERPL-MCNC: 89 MG/DL (ref 70–99)
HBA1C MFR BLD: 5.5 % (ref 0–5.6)
HCT VFR BLD AUTO: 38.6 % (ref 35–47)
HDLC SERPL-MCNC: 39 MG/DL
HGB BLD-MCNC: 13.3 G/DL (ref 11.7–15.7)
LDLC SERPL CALC-MCNC: 89 MG/DL
MCH RBC QN AUTO: 27.1 PG (ref 26.5–33)
MCHC RBC AUTO-ENTMCNC: 34.5 G/DL (ref 31.5–36.5)
MCV RBC AUTO: 79 FL (ref 78–100)
NONHDLC SERPL-MCNC: 139 MG/DL
PLATELET # BLD AUTO: 179 10E3/UL (ref 150–450)
POTASSIUM SERPL-SCNC: 4 MMOL/L (ref 3.4–5.3)
PROT SERPL-MCNC: 7.6 G/DL (ref 6.4–8.3)
RBC # BLD AUTO: 4.9 10E6/UL (ref 3.8–5.2)
SODIUM SERPL-SCNC: 137 MMOL/L (ref 136–145)
TRIGL SERPL-MCNC: 248 MG/DL
TSH SERPL DL<=0.005 MIU/L-ACNC: 1.29 UIU/ML (ref 0.3–4.2)
WBC # BLD AUTO: 5.1 10E3/UL (ref 4–11)

## 2022-08-01 PROCEDURE — G0145 SCR C/V CYTO,THINLAYER,RESCR: HCPCS | Performed by: FAMILY MEDICINE

## 2022-08-01 PROCEDURE — 99396 PREV VISIT EST AGE 40-64: CPT | Performed by: FAMILY MEDICINE

## 2022-08-01 PROCEDURE — 85390 FIBRINOLYSINS SCREEN I&R: CPT | Performed by: PATHOLOGY

## 2022-08-01 PROCEDURE — 85613 RUSSELL VIPER VENOM DILUTED: CPT | Performed by: FAMILY MEDICINE

## 2022-08-01 PROCEDURE — 83036 HEMOGLOBIN GLYCOSYLATED A1C: CPT | Performed by: FAMILY MEDICINE

## 2022-08-01 PROCEDURE — 87624 HPV HI-RISK TYP POOLED RSLT: CPT | Performed by: FAMILY MEDICINE

## 2022-08-01 PROCEDURE — 99214 OFFICE O/P EST MOD 30 MIN: CPT | Mod: 25 | Performed by: FAMILY MEDICINE

## 2022-08-01 PROCEDURE — 80053 COMPREHEN METABOLIC PANEL: CPT | Performed by: FAMILY MEDICINE

## 2022-08-01 PROCEDURE — 84443 ASSAY THYROID STIM HORMONE: CPT | Performed by: FAMILY MEDICINE

## 2022-08-01 PROCEDURE — 36415 COLL VENOUS BLD VENIPUNCTURE: CPT | Performed by: FAMILY MEDICINE

## 2022-08-01 PROCEDURE — 86706 HEP B SURFACE ANTIBODY: CPT | Performed by: FAMILY MEDICINE

## 2022-08-01 PROCEDURE — 85027 COMPLETE CBC AUTOMATED: CPT | Performed by: FAMILY MEDICINE

## 2022-08-01 PROCEDURE — 83520 IMMUNOASSAY QUANT NOS NONAB: CPT | Mod: 90 | Performed by: FAMILY MEDICINE

## 2022-08-01 PROCEDURE — 80061 LIPID PANEL: CPT | Performed by: FAMILY MEDICINE

## 2022-08-01 PROCEDURE — 85730 THROMBOPLASTIN TIME PARTIAL: CPT | Performed by: FAMILY MEDICINE

## 2022-08-01 PROCEDURE — G0124 SCREEN C/V THIN LAYER BY MD: HCPCS | Performed by: PATHOLOGY

## 2022-08-01 PROCEDURE — 99000 SPECIMEN HANDLING OFFICE-LAB: CPT | Performed by: FAMILY MEDICINE

## 2022-08-01 RX ORDER — SUMATRIPTAN 100 MG/1
100 TABLET, FILM COATED ORAL
Qty: 10 TABLET | Refills: 11 | Status: SHIPPED | OUTPATIENT
Start: 2022-08-01 | End: 2023-10-09

## 2022-08-01 RX ORDER — SUMATRIPTAN 100 MG/1
TABLET, FILM COATED ORAL
COMMUNITY
Start: 2022-07-07 | End: 2022-08-01

## 2022-08-01 RX ORDER — HYDROXYCHLOROQUINE SULFATE 200 MG/1
TABLET, FILM COATED ORAL
COMMUNITY
Start: 2022-03-23

## 2022-08-01 RX ORDER — AZATHIOPRINE 50 MG/1
50 TABLET ORAL DAILY
COMMUNITY
Start: 2021-11-30

## 2022-08-01 ASSESSMENT — ENCOUNTER SYMPTOMS
PARESTHESIAS: 0
PALPITATIONS: 0
ARTHRALGIAS: 0
COUGH: 0
NAUSEA: 0
BREAST MASS: 0
SHORTNESS OF BREATH: 0
HEADACHES: 1
HEARTBURN: 0
EYE PAIN: 0
WEAKNESS: 0
FEVER: 0
HEMATOCHEZIA: 0
CHILLS: 0
NERVOUS/ANXIOUS: 0
JOINT SWELLING: 0
CONSTIPATION: 0
DIARRHEA: 0
DYSURIA: 0
HEMATURIA: 0
SORE THROAT: 0
DIZZINESS: 0
FREQUENCY: 0
MYALGIAS: 0
ABDOMINAL PAIN: 0

## 2022-08-01 NOTE — PROGRESS NOTES
SUBJECTIVE:   CC: Earnestine Guillen is an 41 year old woman who presents for preventive health visit.       Patient has been advised of split billing requirements and indicates understanding: Yes  Healthy Habits:     Getting at least 3 servings of Calcium per day:  Yes    Bi-annual eye exam:  Yes    Dental care twice a year:  Yes    Sleep apnea or symptoms of sleep apnea:  Excessive snoring    Diet:  Regular (no restrictions)    Frequency of exercise:  2-3 days/week    Duration of exercise:  15-30 minutes    Taking medications regularly:  Yes    Medication side effects:  None    PHQ-2 Total Score: 0    Additional concerns today:  Yes      Chief Complaint   Patient presents with     Physical     Not fasting       She has now had 3 miscarriages she states but just bringing this to our attention today   Miscarriages- all within 1st trimester:   Nov 2021 Feb 2022 (seen by OB at that time)  May 2022  Needs refill on her triptan; doesn't take it during her pregnancies/miscarriages; just noticing more migraines with the stress of the miscarriages.     To review: 2 pregnancies with prior partner; 1 pregnancy (baby Ike) with current . During that pregnancy: High risk pregnancy- AMA, Autoimmune hepatitis on Azathioprine, Sjogren's with + SSA/SSB bella on plaquenil followed by MFM for fetal heart rate monitoring ([place] intervals and echos normal) through 2nd trimester, and GDMA1 well controlled    Follows with Rheumatology Dr. Neil for her Sjogren's/+SSA/SSB bella - on plaquenil, imuran    Pap hx:   2017  NILM, neg HPV   2018 ASCUS, Positive for High Risk HPV types other than 16 or 18   37 y.o.  [place] normal  Plan PAP/HPV in 1 year. Crystal Wright MD    Social History     Social History Narrative    , 3 children    Non smoker    Rebecca Mckenna MD         Today's PHQ-2 Score:   PHQ-2 ( 1999 Pfizer) 8/1/2022   Q1: Little interest or pleasure in doing things 0   Q2: Feeling down, depressed or hopeless 0   PHQ-2  Score 0   Q1: Little interest or pleasure in doing things Not at all   Q2: Feeling down, depressed or hopeless Not at all   PHQ-2 Score 0       Abuse: Current or Past (Physical, Sexual or Emotional) - No  Do you feel safe in your environment? Yes    Have you ever done Advance Care Planning? (For example, a Health Directive, POLST, or a discussion with a medical provider or your loved ones about your wishes): No, advance care planning information given to patient to review.  Patient plans to discuss their wishes with loved ones or provider.      Social History     Tobacco Use     Smoking status: Never Smoker     Smokeless tobacco: Never Used   Substance Use Topics     Alcohol use: Not on file     If you drink alcohol do you typically have >3 drinks per day or >7 drinks per week? No    Alcohol Use 8/1/2022   Prescreen: >3 drinks/day or >7 drinks/week? No   Prescreen: >3 drinks/day or >7 drinks/week? -   No flowsheet data found.    Reviewed orders with patient.  Reviewed health maintenance and updated orders accordingly - Yes      Breast Cancer Screening:    Breast CA Risk Assessment (FHS-7) 8/1/2022   Do you have a family history of breast, colon, or ovarian cancer? No / Unknown           Pertinent mammograms are reviewed under the imaging tab.    History of abnormal Pap smear: YES - updated in Problem List and Health Maintenance accordingly  PAP / HPV 7/23/2018   HPV See Scanned Report     Reviewed and updated as needed this visit by clinical staff   Tobacco  Allergies  Meds  Problems  Med Hx  Surg Hx  Fam Hx            Reviewed and updated as needed this visit by Provider   Tobacco  Allergies  Meds  Problems  Med Hx  Surg Hx  Fam Hx               Review of Systems   Constitutional: Negative for chills and fever.   HENT: Negative for congestion, ear pain, hearing loss and sore throat.    Eyes: Negative for pain and visual disturbance.   Respiratory: Negative for cough and shortness of breath.     Cardiovascular: Negative for chest pain, palpitations and peripheral edema.   Gastrointestinal: Negative for abdominal pain, constipation, diarrhea, heartburn, hematochezia and nausea.   Breasts:  Negative for tenderness, breast mass and discharge.   Genitourinary: Negative for dysuria, frequency, genital sores, hematuria, pelvic pain, urgency, vaginal bleeding and vaginal discharge.   Musculoskeletal: Negative for arthralgias, joint swelling and myalgias.   Skin: Negative for rash.   Neurological: Positive for headaches. Negative for dizziness, weakness and paresthesias.   Psychiatric/Behavioral: Negative for mood changes. The patient is not nervous/anxious.         OBJECTIVE:   /76 (BP Location: Left arm, Patient Position: Sitting, Cuff Size: Adult Regular)   Pulse 68   Ht 1.524 m (5')   Wt 65 kg (143 lb 6.4 oz)   LMP 07/24/2022   BMI 28.01 kg/m    Physical Exam  GENERAL: healthy, alert and no distress  NECK: no adenopathy, no asymmetry, masses, or scars and thyroid normal to palpation  RESP: lungs clear to auscultation - no rales, rhonchi or wheezes  BREAST: normal without masses, tenderness or nipple discharge and no palpable axillary masses or adenopathy  CV: regular rate and rhythm, normal S1 S2, no S3 or S4, no murmur, click or rub, no peripheral edema and peripheral pulses strong  ABDOMEN: soft, nontender, no hepatosplenomegaly, no masses and bowel sounds normal   (female): normal female external genitalia, normal urethral meatus, vaginal mucosa, normal cervix/adnexa/uterus without masses or discharge  MS: no gross musculoskeletal defects noted, no edema    Diagnostic Test Results:  Labs reviewed in Epic    ASSESSMENT/PLAN:   Earnestine was seen today for physical.    Diagnoses and all orders for this visit:    Routine general medical examination at a health care facility  -     Comprehensive metabolic panel (BMP + Alb, Alk Phos, ALT, AST, Total. Bili, TP); Future  -     CBC with platelets;  Future  -     Hemoglobin A1c; Future  -     Lipid panel reflex to direct LDL Non-fasting; Future    History of recurrent miscarriages  History of  premature rupture of membranes (PPROM)  We reviewed her recurrent miscarriage history is likely due to the underlying Sjogren's with known SSA and SSB  antibodies positive.  We will start with a few additional lab test today.  She is interested to meet with maternal-fetal medicine for preconception counseling given her high pregnancy risks in the past and for miscarriage recommendations going forward.  She is still hoping to achieve spontaneous pregnancy as she did a few years ago.  -     Infertility/AI Referral; Future  -     Mullerian Hormone Antibody; Future  -     TSH with free T4 reflex; Future  -     Lupus Anticoagulant Panel; Future  -     Mat Fetal Med CTR Referral - Preconception; Future    Sjogren's syndrome with keratoconjunctivitis sicca (H)  SS-A and SS-B antibody positive  Positive EMELIA (antinuclear antibody)  Followed by rheumatology; on AZA and plaquenil (ok to continue these during pregnancy); a wean off AZA caused autoimmune hepatitis in the past.   -     Mat Fetal Med CTR Referral - Preconception; Future      History of diet controlled gestational diabetes mellitus (GDM)  -     Hemoglobin A1c; Future    Hx of hepatitis, autoimmune (H)  -     Comprehensive metabolic panel (BMP + Alb, Alk Phos, ALT, AST, Total. Bili, TP); Future  -     Mat Fetal Med CTR Referral - Preconception; Future    Cervical cancer screening- ASCUS/HPV+, repeat 2019- delay due to pregnancy  -     Pap screen with HPV - recommended age 30 - 65 years    Screening for cervical cancer  -     Pap screen with HPV - recommended age 30 - 65 years    Other orders  -     REVIEW OF HEALTH MAINTENANCE PROTOCOL ORDERS  -     HEPATITIS B VACCINE, ADULT, IM; Standing    Will screen for Hep B surface antigen after the 2018 booster/dose was given     Patient has been advised of split  billing requirements and indicates understanding: Yes    COUNSELING:  Reviewed preventive health counseling, as reflected in patient instructions    Estimated body mass index is 28.01 kg/m  as calculated from the following:    Height as of this encounter: 1.524 m (5').    Weight as of this encounter: 65 kg (143 lb 6.4 oz).    Weight management plan: Discussed healthy diet and exercise guidelines    She reports that she has never smoked. She has never used smokeless tobacco.      Counseling Resources:  ATP IV Guidelines  Pooled Cohorts Equation Calculator  Breast Cancer Risk Calculator  BRCA-Related Cancer Risk Assessment: FHS-7 Tool  FRAX Risk Assessment  ICSI Preventive Guidelines  Dietary Guidelines for Americans, 2010  USDA's MyPlate  ASA Prophylaxis  Lung CA Screening    Rebecca Mckenna MD  [facility]

## 2022-08-01 NOTE — PATIENT INSTRUCTIONS
I'll place a general referral for a fertility clinic. You can explore options with your insurance providers to confirm if these are in network. We generally recommend [facility] ([phone]), [facility] ([phone] in the [facility]) or [facility] in [place] [phone].

## 2022-08-02 LAB
DRVVT SCREEN RATIO: 0.86
HBV SURFACE AB SERPL IA-ACNC: 0 M[IU]/ML
INR PPP: 1.01 (ref 0.85–1.15)
LA PPP-IMP: NEGATIVE
LUPUS INTERPRETATION: NORMAL
PTT RATIO: 1.14
THROMBIN TIME: 17.2 SECONDS (ref 13–19)

## 2022-08-04 DIAGNOSIS — M35.00 SJOGREN'S SYNDROME, WITH UNSPECIFIED ORGAN INVOLVEMENT (H): ICD-10-CM

## 2022-08-04 DIAGNOSIS — R76.8 POSITIVE ANA (ANTINUCLEAR ANTIBODY): Primary | ICD-10-CM

## 2022-08-04 DIAGNOSIS — R76.8 SS-A ANTIBODY POSITIVE: ICD-10-CM

## 2022-08-04 DIAGNOSIS — O09.299: ICD-10-CM

## 2022-08-04 DIAGNOSIS — K75.4 HEPATITIS, AUTOIMMUNE (H): ICD-10-CM

## 2022-08-04 LAB
Lab: NORMAL
PERFORMING LABORATORY: NORMAL
SPECIMEN STATUS: NORMAL
TEST NAME: NORMAL

## 2022-08-07 LAB — MISCELLANEOUS TEST 1 (ARUP): NORMAL

## 2022-08-08 LAB
BKR LAB AP GYN ADEQUACY: ABNORMAL
BKR LAB AP GYN INTERPRETATION: ABNORMAL
BKR LAB AP HPV REFLEX: ABNORMAL
BKR LAB AP LMP: ABNORMAL
BKR LAB AP PREVIOUS ABNL DX: ABNORMAL
BKR LAB AP PREVIOUS ABNORMAL: ABNORMAL
PATH REPORT.COMMENTS IMP SPEC: ABNORMAL
PATH REPORT.COMMENTS IMP SPEC: ABNORMAL
PATH REPORT.RELEVANT HX SPEC: ABNORMAL

## 2022-08-12 LAB
HUMAN PAPILLOMA VIRUS 16 DNA: NEGATIVE
HUMAN PAPILLOMA VIRUS 18 DNA: NEGATIVE
HUMAN PAPILLOMA VIRUS FINAL DIAGNOSIS: ABNORMAL
HUMAN PAPILLOMA VIRUS OTHER HR: POSITIVE

## 2022-08-15 ENCOUNTER — PATIENT OUTREACH (OUTPATIENT)
Dept: FAMILY MEDICINE | Facility: CLINIC | Age: 42
End: 2022-08-15

## 2022-09-18 ENCOUNTER — HEALTH MAINTENANCE LETTER (OUTPATIENT)
Age: 42
End: 2022-09-18

## 2022-10-12 ENCOUNTER — APPOINTMENT (OUTPATIENT)
Dept: NEUROLOGY | Facility: CLINIC | Age: 42
End: 2022-10-12

## 2022-10-12 VITALS
SYSTOLIC BLOOD PRESSURE: 114 MMHG | DIASTOLIC BLOOD PRESSURE: 76 MMHG | WEIGHT: 137 LBS | BODY MASS INDEX: 24.27 KG/M2 | HEIGHT: 63 IN

## 2022-10-12 DIAGNOSIS — G44.209 TENSION-TYPE HEADACHE, UNSPECIFIED, NOT INTRACTABLE: ICD-10-CM

## 2022-10-12 PROCEDURE — 99214 OFFICE O/P EST MOD 30 MIN: CPT

## 2022-10-12 NOTE — REASON FOR VISIT
[Follow-Up: _____] : a [unfilled] follow-up visit [[employer] ] : provided by [employer]   [TWNoteComboBox1] : [place]e

## 2022-10-12 NOTE — PHYSICAL EXAM

## 2022-10-12 NOTE — ASSESSMENT
[FreeTextEntry1] : This is a 41-year-old woman with tension headache which has been worsening over the past week or so.  I like to increase her nortriptyline to 50 mg at night.  If this does not help we may need to do imaging, however at this time given her normal neurologic examination I will defer it.  I will see her back in 3 months, sooner should the need arise.

## 2022-10-12 NOTE — CONSULT LETTER
[Dear  ___] : Dear  [unfilled], [Courtesy Letter:] : I had the pleasure of seeing your patient, [unfilled], in my office today. [Please see my note below.] : Please see my note below. [Consult Closing:] : Thank you very much for allowing me to participate in the care of this patient.  If you have any questions, please do not hesitate to contact me. [Sincerely,] : Sincerely, [FreeTextEntry3] : Vernon Orta M.D., Ph.D. DPN-N\par [facility]\par Neurology at [place]\par Medical Director of Stroke Services\par [facility]\par

## 2022-10-18 ENCOUNTER — TELEPHONE (OUTPATIENT)
Dept: FAMILY MEDICINE | Facility: CLINIC | Age: 42
End: 2022-10-18

## 2022-10-31 ENCOUNTER — TELEPHONE (OUTPATIENT)
Dept: FAMILY MEDICINE | Facility: CLINIC | Age: 42
End: 2022-10-31

## 2022-10-31 NOTE — TELEPHONE ENCOUNTER
Patient dropped forms off to be completed by Dr. Mckenna.    Complete first page and refax all to [phone]    Routed to  [employer]

## 2022-10-31 NOTE — TELEPHONE ENCOUNTER
Forms/Letter Request    Type of form/letter: FMLA - Unknown    Have you been seen for this request: Yes forms need to be refilled out and faxed back; Dr. Mckenna missed a section (part C)    Do we have the form/letter: Yes: Spouse will be dropping off ppw    Also has a form attached in [employer] msg for Ike Marie ( 2020)  form    When is form/letter needed by: asap    How would you like the form/letter returned: Fax    Patient Notified form requests are processed in 3-5 business days:Yes    Could we send this information to you in [employer] or would you prefer to receive a phone call?:   Patient would prefer a phone call   Okay to leave a detailed message?: Yes at Home number on file [phone] (home)

## 2022-11-03 NOTE — TELEPHONE ENCOUNTER
I have filled out approximately 4-5 forms for this. I'm not specifically sure which forms need to be refilled as we have done them multiple times now. I recently did update what I thought was that section.     Please ensure the form for debby Ramires  form has been printed and filled out for my review.

## 2022-11-08 NOTE — TELEPHONE ENCOUNTER
Forms have been completed and faxed to the fax number on the form. Left a message for patient that they are available to be picked up at the  at her convenience. Advised patient to call back if she has any questions. Form was sent to HIM to be scanned into patient's child's chart.

## 2022-11-11 ENCOUNTER — PRE VISIT (OUTPATIENT)
Dept: MATERNAL FETAL MEDICINE | Facility: CLINIC | Age: 42
End: 2022-11-11

## 2022-11-17 ENCOUNTER — LAB (OUTPATIENT)
Dept: LAB | Facility: CLINIC | Age: 42
End: 2022-11-17
Attending: OBSTETRICS & GYNECOLOGY
Payer: COMMERCIAL

## 2022-11-17 ENCOUNTER — OFFICE VISIT (OUTPATIENT)
Dept: MATERNAL FETAL MEDICINE | Facility: CLINIC | Age: 42
End: 2022-11-17
Attending: OBSTETRICS & GYNECOLOGY
Payer: COMMERCIAL

## 2022-11-17 VITALS
DIASTOLIC BLOOD PRESSURE: 84 MMHG | OXYGEN SATURATION: 98 % | HEART RATE: 66 BPM | RESPIRATION RATE: 18 BRPM | SYSTOLIC BLOOD PRESSURE: 127 MMHG

## 2022-11-17 DIAGNOSIS — R76.8 POSITIVE ANA (ANTINUCLEAR ANTIBODY): ICD-10-CM

## 2022-11-17 DIAGNOSIS — N96 FEMALE INFERTILITY WITH RECURRENT PREGNANCY LOSS: ICD-10-CM

## 2022-11-17 DIAGNOSIS — N96 FEMALE INFERTILITY WITH RECURRENT PREGNANCY LOSS: Primary | ICD-10-CM

## 2022-11-17 DIAGNOSIS — N97.9 FEMALE INFERTILITY WITH RECURRENT PREGNANCY LOSS: ICD-10-CM

## 2022-11-17 DIAGNOSIS — N96 HISTORY OF RECURRENT MISCARRIAGES, NOT CURRENTLY PREGNANT: ICD-10-CM

## 2022-11-17 DIAGNOSIS — K75.4 HEPATITIS, AUTOIMMUNE (H): ICD-10-CM

## 2022-11-17 DIAGNOSIS — R76.8 SS-A ANTIBODY POSITIVE: ICD-10-CM

## 2022-11-17 DIAGNOSIS — M35.00 SJOGREN'S SYNDROME, WITH UNSPECIFIED ORGAN INVOLVEMENT (H): ICD-10-CM

## 2022-11-17 DIAGNOSIS — N97.9 FEMALE INFERTILITY WITH RECURRENT PREGNANCY LOSS: Primary | ICD-10-CM

## 2022-11-17 DIAGNOSIS — Z31.69 ENCOUNTER FOR PRECONCEPTION CONSULTATION: Primary | ICD-10-CM

## 2022-11-17 DIAGNOSIS — Z87.51 HISTORY OF PRETERM DELIVERY: ICD-10-CM

## 2022-11-17 PROCEDURE — G0463 HOSPITAL OUTPT CLINIC VISIT: HCPCS | Performed by: OBSTETRICS & GYNECOLOGY

## 2022-11-17 PROCEDURE — 36415 COLL VENOUS BLD VENIPUNCTURE: CPT

## 2022-11-17 PROCEDURE — 88291 CYTO/MOLECULAR REPORT: CPT | Performed by: MEDICAL GENETICS

## 2022-11-17 PROCEDURE — 96040 HC GENETIC COUNSELING, EACH 30 MINUTES: CPT | Performed by: GENETIC COUNSELOR, MS

## 2022-11-17 PROCEDURE — 88289 CHROMOSOME STUDY ADDITIONAL: CPT

## 2022-11-17 PROCEDURE — 99214 OFFICE O/P EST MOD 30 MIN: CPT | Mod: GC | Performed by: OBSTETRICS & GYNECOLOGY

## 2022-11-17 NOTE — PROGRESS NOTES
[facility]  Maternal Fetal Medicine Consult Note    Patient: Earnestine Solis  : 1980  MRN: [id number]    2022     REFERRAL:  Earnestine Solis is a 41 year old sent by Dr. Mckenna from [facility] for MFM consultation.    HPI:  Earnestine Solis is a 41 year old  here for MFM consultation regarding recurrent pregnancy loss (3 recent first trimester miscarriages),  birth and Sjogren's syndrome. She was seen by our office in  for counseling regarding: AMA, +SSA/SSB antibodies, and Autoimmune hepatitis.    Since last visit patient did have  delivery at 34w3d (20). Per review of medical records patient presented to labor and delivery with prolonged premature rupture of membranes. She ultimately underwent an uncomplicated .     Today patient is here alone. She describes having 3 recent (2021, 2022, and 2022) early first trimester miscarriages. She is trying for another pregnancy with her partner who is turning 50 this year. She would like to talk about what may be causing these miscarriages and if her Sjogren's syndrome may be contributing.     She has continued to follow closely with rheumatology. She is currently hydroxychlorquine and notes that she has recently been stable.     Obstetrics History:  OB History    Para Term  AB Living   6 3 2 1 3 3   SAB IAB Ectopic Multiple Live Births   3 0 0 0 3      # Outcome Date GA Lbr Deon/2nd Weight Sex Delivery Anes PTL Lv   6 SAB 2022           5 SAB 2022           4 SAB 2021           3  20 34w3d 03:47 / 00:03 2.296 kg (5 lb 1 oz) M Vag-Spont Nitrous Y JENN      Name: JIMMY SOLIS      Apgar1: 8  Apgar5: 9   2 Term 03 40w0d  3.175 kg (7 lb) M Vag-Spont  N JENN   1 Term 00 38w0d  2.722 kg (6 lb) F Vag-Spont  N JENN      Obstetric Comments   Miscarriages- all within 1st trimester:    2021   2022   May 2022       Gynecologic  History:  - Menstrual history: pt reports cycle comes q monthly, approximately 28 day intervals  - Pap 18- ASCUS with + HPV. Colposcopy done 18  - History of pyelonephritis and UTI. No history of STI    Past Medical History:  Past Medical History:   Diagnosis Date     Acute pyelonephritis      Anemia      Autoimmune hepatitis (H) 2018     Cervicalgia      Diabetes mellitus (H)     DGDM     Migraine      PID (acute pelvic inflammatory disease) 2018     Transaminitis        Past Surgical History:  Past Surgical History:   Procedure Laterality Date     INJECTION ANESTHETIC AGENT TO CERVICAL PLEXUS         Current Medications:  Prior to Admission medications    Medication Sig Last Dose Taking? Auth Provider Long Term End Date   azaTHIOprine (IMURAN) 50 MG tablet Take 50 mg by mouth daily   Reported, Patient Yes    hydroxychloroquine (PLAQUENIL) 200 MG tablet TAKE 1 & 1/2 TABLETS (300 MG) BY MOUTH ONCE DAILY.   Reported, Patient     hydrOXYzine (VISTARIL) 50 MG capsule Take 1-2 capsules ( mg) by mouth 3 times daily as needed for itching (Can take up to 100mg at night)   Debbi Robison,      Prenatal Vit-Fe Fumarate-FA (PRENATAL MULTIVITAMIN W/IRON) 27-0.8 MG tablet Take 1 tablet by mouth daily   Reported, Patient     SUMAtriptan (IMITREX) 100 MG tablet Take 1 tablet (100 mg) by mouth at onset of headache for migraine   Rebecca Mckenna MD         Allergies:  Methocarbamol, Propranolol, and Venlafaxine    Social History:   Status: In a relationship  Denies use of alcohol, drugs or smoking.    Family History:  Denies history of genetic disorders, preeclampsia, thromboembolic disease, bleeding disorders, developmental delay.    ROS:  10-point ROS negative except as in HPI     PHYSICAL EXAM:  Deferred     Other Imaging:   N/a    ASSESSMENT/PLAN:  Earnestine Guillen is a 41 year old  who is here for [facility] consultation regarding:       # Recurrent Pregnancy Loss   Miscarriage is the most  common complication of early pregnancy.  An estimated 8-20% of clinically recognized pregnancies less than 20 weeks of gestation will miscarry.  80% of miscarriages happen in the first 12 weeks of gestation.  Risk factors for miscarriage include:    Advancing maternal age which is the most important risk factor in determining miscarriage risk.     Chromosomal abnormalities account for approximately 50% of all miscarriages and the earlier the gestational age at miscarriage the higher the incidence.  Autosomal trisomies make up 52% (with trisomy 16 being the most frequent), monosomy X 19%, and polyploidies 22%.   Congenital anomalies are another etiology of miscarriage.  Maternal uterine anomalies, such as a septum ,can increase the risk of miscarriage.  Poorly controlled thyroid disease or diabetes increase the risk of miscarriage.  Acute maternal infection can lead to miscarriage.  There are also miscarriages that are unexplained.      Recurrent pregnancy loss (RPL) refers to three or more consecutive losses of clinically recognized pregnancies prior to 20 weeks of gestation.  While approximately 15% of women experience 1 miscarriage, only 2% experience 2 consecutive losses and less than 1% experience 3 consecutive losses.   In addition to the above risk factors for pregnancy loss the antiphospholipid antibody syndrome and parental karyotype abnormalities must be considered in women with RPL.  Unfortunately many women have no identifiable etiology for their miscarriages and many of these will still go on to have successful pregnancies.      Given that patient has experienced pregnancy and subsequent delivery in the past, discussed low likelihood that possible uterine anomaly is contributing to miscarriages.     Recommendations:    Maternal and Parental karyotype    Evaluation of thyroid function with TSH and TPO    Once pregnant consider growth ultrasounds in the third trimester (28 and 34 weeks)    # History of  Spontaneous  Birth (PTB)     Today we discussed the incidence and epidemiology of  birth (PTB).  There are multiple etiologies of spontaneous PTB, including but not limited to infection, bleeding, uterine distension, cervical insufficiency and stress.   However, most spontaneous  deliveries occur in patients with no risk factors.  A history of prior  delivery is a significant risk factor for recurrence in a subsequent pregnancy.  Recurrent  delivery has been linked to maternal ethnicity, genitourinary infection, and especially gestational age at the first  delivery: the earlier the delivery, the greater the likelihood of recurrence.  We discussed that recurrent  birth can happen at the same gestational age as a prior  birth but that it cannot be predicted and it could recur at an earlier gestational age.  We also discussed the concept of cervical insufficiency which is another cause of  birth and is classically described as painless cervical dilation.  Most women with cervical insufficiency which is either undiagnosed or those undergoing expectant management will eventually develop symptoms, which may be pressure, back pain, cramping, leakage of fluid, vaginal bleeding and ultimately contractions.  Mid-trimester cervical dilation increases the risk of intra-amniotic infection which can then cause contractions and labor.      We discussed the increased  morbidity and mortality with prematurity which is the rationale for trying to prevent recurrent  birth.  We follow cervical lengths every two from 16 to 23 weeks in women with a history of PTB; an ultrasound indicated cerclage may be considered if shortening is detected prior to 23-24 weeks, given that all cases of cervical insufficiency may not fit into the classic patterns by history.    Recommendations:    Early ultrasound to confirm KRISHNA.    Optimize modifiable risk factors: smoking  cessation, avoidance of cocaine, maintenance of adequate nutrition.    Baseline transvaginal cervical length at 16 weeks' gestation with measurements every 2 weeks through 23 weeks.       Ultrasound-indicated cerclage should be considered if shortening <25 mm is diagnosed <24 weeks.    Administration of  corticosteroids if the patient is deemed to be at increased risk of imminent  delivery.    Clinical evaluation of  labor symptoms.      # Sjogren's Syndrome  # +SSA/SSB antibodies  Sjogren disease may occur alone or in a secondary form associated with other disorders (such as rheumatoid arthritis).  While it is characterized by exocrine gland dysfunction, it may also involves many other organ systems. Women with primary Sjogren s disease tend to do very well during pregnancy. We did discuss with patient that given symptoms are currently stable, Sjogren's diagnosis is likely not related to her recurrent miscarriages.    Women with SS-A (Ro) or SS-B (La) antibodies are at risk for  lupus, characterized by rash and congenital heart block (CHB). The risk of developing heart block in offspring of women with anti-Ro/SSA antibodies in the absence of a prior birth with  lupus syndrome is approximately 1-2%.   It increases to 15-20% if a previous child was affected.     Recommendations    Baseline creatinine, liver function tests, platelets and urine P/Cr ratio    Fetal echocardiogram at 20-22 weeks    If titers are marginal (ie <20-40 EU) they should be repeated at a reference lab, if not done already.     Patient was seen and careplan managed under the supervision of Dr. Mcclain     At the end of our discussion, Earnestine Guillen indicated that her questions were answered and she seemed satisfied with our discussion. Thank you for the opportunity to participate in your patient s care. If we can be of any further assistance, please do not hesitate to contact the clinic.    Thank you for  allowing us to participate in the care of your patient. Please do not hesitate to contact us if you have further questions regarding the management of your patient.     Sincerely,    Chang Butler DO, MS  Obstetrics, Gynecology & Women's Health   Resident, PGY-2  11/17/2022 1:27 PM     Physician Attestation   I, Buddy Mcclain MD, saw this patient and agree with the findings and plan of care as documented in the note.      Items personally reviewed/procedural attestation: History and plan of care/management. A total of 30 minutes was spent  In  Face-to-face counseling (>50% for medical management discussion and plan of care). A copy of this consult was sent to your office.     Buddy Mcclain MD

## 2022-11-17 NOTE — PROGRESS NOTES
[facility]  Genetic Counseling Consult    Patient: Earnestine Guillen YOB: 1980   Date of Service: 22      Earnestine Guillen was seen at [facility] for preconception genetic consultation. The indication is recurrent pregnancy loss and advanced maternal age.        Impression/Plan:   1.  Earnestine has a history of three first-trimester losses. Today, she opted to proceed with a peripheral blood karyotype for evaluation of possible maternal rearrangement. We discussed that insurance coverage for this testing is variable; writer offered to provide Earnestine with CPT codes to determine coverage prior to proceeding. She opted to proceed today. We discussed that if results are normal, her partner could consider testing.    2.  Earnestine also had a consultation with the [facility] team. Please see corresponding documentation for additional details.     Pregnancy History:   /Parity:    Age at Delivery: 41 year old    No significant complications or exposures were reported in the current pregnancy.    Earnestine s pregnancy history is significant for:  o  full-term delivery, female, alive and well  o  full-term delivery, male, alive and well  o   delivery at 34w3d, male, alive and well.   o  SAB, clinically confirmed with hcg but no ultrasound  o  SAB, clinically confirmed with hcg but no ultrasound  o  SAB, not clinically confirmed - Earnestine reports she was approximately 8wks    Medical History:   Recurrent miscarriage is defined as three or more clinically recognized consecutive or non-consecutive pregnancy losses occurring prior to fetal viability (<24 weeks). Earnestine's personal history is significant for threepregnancy losses at 5-8 weeks. Two of these were clinically confirmed, one was not.    While she does not meet typical criteria for recurrent pregnancy loss, based on the health provider's professional  judgment, evaluation of couples with two miscarriages may be initiated when deemed necessary especially when taking into account the woman's age and length of time that the couple spent trying to have a successful pregnancy.    At least 10-15% of the recognized pregnancies end in miscarriage, with most losses occurring in the first trimester. The rate of miscarriage less than 8 weeks gestation may be even greater as some women may not recognize that they are pregnant. Around half of miscarriages that occur before 20 weeks gestation are caused by chromosome abnormalities. Of these chromosomally atypical fetuses, ~ approximately 60% have an autosomal trisomy with trisomy 16 being the most common. Monosomy X is the second most common chromosomal etiology, accounting for ~20%, and triploidy accounts for ~15%. Polyploidies and structural rearrangements constitute the remainder. The risk for a miscarriage increases with advancing maternal age due to a higher incidence of conceptuses with a chromosomal aneuploidy. The risk may approach 75% in women who are 45 years of age and older.     Familial chromosome rearrangements can lead to a history of recurrent pregnancy loss or infertility. They can also increase the potential risk for a stillbirth or liveborn babies with birth differences, developmental concerns, or other health problems. In this case a parent would carry a balanced rearrangement with an equal exchange of chromosome material. An individual with a balanced translocation is typically healthy. However, a child of this individual may inherit one of the rearranged chromosomes, along with typical chromosomes, resulting in an unbalanced rearrangement with total loss and gain of chromosome material. If a conceptus has unbalanced chromosome material it could lead to miscarriages or an affected child. This result can be dependent on the amount of material that is gained or loss and what chromosome the material is from.  If a chromosome translocation is inherited through a family there are typically multiple individuals over multiple generations with recurrent pregnancy loss and/or affected individuals. However, it is also possible for a de esmer translocation or other rearrangement to occur. For couples who have experienced three or more unexplained pregnancy losses, it has been estimated that around 2-5% of these couples may carry a rearrangement. Chromosome rearrangements causing pregnancy loss are more common in the female partner. Chromosome rearrangements in males are more likely to cause infertility. Chromosome analysis on parents is possible by obtaining a karyotype on peripheral blood.     Other genetic causes of recurrent loss could include lethal autosomal recessive conditions or x-linked dominant conditions, though these are very rare. Non-genetic causes of pregnancy loss can include maternal uterine anomalies, endocrine concerns such as diabetes or thyroid disease, antiphospholipid syndrome, and environmental agents. Please see [facility] consult for further details.     In about 50% of couples with recurrent pregnancy loss, the etiology remains unknown despite a thorough evaluation and is therefore classified as idiopathic. It is estimated that couples with idiopathic recurrent pregnacy loss can have up to a 75% chance of having a successful pregnancy.       Family History:   A three-generation pedigree was previously obtained by [facility] in 2019, and is scanned under the  Media  tab.     Earnestine reports that her father and Ike's mother are now , both from complications of type II diabetes. Two of Earnestine's brothers have also been diagnosed with type II diabetes.     Of note, Ike is of advanced paternal age (he will be 50 in ). We discussed how there is a slight increased risk for de esmer, single gene disorders in men of advanced paternal age. Due to the increase in DNA mutations in sperm, older men are  at higher risk of fathering children with certain autosomal dominant genetic disorders, such as achondroplasia. There is also increasing evidence that paternal age is associated with an increased risk of multifactorial conditions such as schizophrenia and autism. There is currently no clearly accepted definition of advanced paternal age, although a frequently used criterion is any man aged 40 years or older at the time of conception. Given the variability of these conditions, a level II ultrasound would not necessarily detect all of these disorders.     Otherwise, the reported family history is negative for multiple miscarriages, stillbirths, birth defects, intellectual disability, known genetic conditions, and consanguinity.       Carrier Screening:   Expanded carrier screening for mutations in a large panel of genes associated with autosomal recessive conditions including cystic fibrosis, thalassemias, hearing loss, spinal muscular atrophy, and others, is now available. We also reviewed that expanded carrier screening can assess for common X-linked recessive disorders such as Fragile X syndrome.     We discussed that expanded carrier screening is designed to identify carrier status for conditions that are primarily childhood or adolescent onset. Expanded carrier screening does not evaluate for adult-onset conditions such as hereditary cancer syndromes, dementia/ Alzheimer's disease, or cardiovascular disease risk factors. Additionally, expanded carrier screening is not comprehensive for all known genetic diseases or inherited conditions.      The patient has declined the carrier screening options reviewed today.       Risk Assessment for Chromosome Conditions:   We explained that the risk for fetal chromosome abnormalities increases with maternal age. We discussed specific features of common chromosome abnormalities, including Down syndrome, trisomy 13, trisomy 18, and sex chromosome conditions. Earnestine is not  currently pregnant. For reference only:      - At age 42 at midtrimester, the risk to have a baby with Down syndrome is 1 in 42.     - At age 42 at midtrimester, the risk to have a baby with any chromosome abnormality is 1 in 25.     - At age 44 at midtrimester, the risk to have a baby with Down syndrome is 1 in 23.     - At age 44 at midtrimester, the risk to have a baby with any chromosome abnormality is 1 in 15.     - At age 46 at midtrimester, the risk to have a baby with Down syndrome is 1 in 15.     - At age 46 at midtrimester, the risk to have a baby with any chromosome abnormality is 1 in 9.        Testing Options:   We discussed the following prenatal options:   Non-invasive Prenatal Testing (NIPT)    Maternal plasma cell-free DNA testing; first trimester ultrasound with nuchal translucency and nasal bone assessment is recommended, when appropriate    Screens for fetal trisomy 21, trisomy 13, trisomy 18, and sex chromosome aneuploidy    Cannot screen for open neural tube defects; maternal serum AFP after 15 weeks is recommended     Chorionic villus sampling (CVS)    Invasive procedure typically performed in the first trimester by which placental villi are obtained for the purpose of chromosome analysis and/or other prenatal genetic analysis    Diagnostic results; >99% sensitivity for fetal chromosome abnormalities    Cannot test for open neural tube defects; maternal serum AFP after 15 weeks is recommended     Genetic Amniocentesis    Invasive procedure typically performed in the second trimester by which amniotic fluid is obtained for the purpose of chromosome analysis and/or other prenatal genetic analysis    Diagnostic results; >99% sensitivity for fetal chromosome abnormalities    AFAFP measurement tests for open neural tube defects     Comprehensive (Level II) ultrasound: Detailed ultrasound performed between 18-22 weeks gestation to screen for major birth defects and markers for aneuploidy.      We  reviewed the benefits and limitations of this testing.  Screening tests provide a risk assessment specific to the pregnancy for certain fetal chromosome abnormalities, but cannot definitively diagnose or exclude a fetal chromosome abnormality.  Follow-up genetic counseling and consideration of diagnostic testing is recommended with any abnormal screening result.     Diagnostic tests carry inherent risks- including risk of miscarriage- that require careful consideration.  These tests can detect fetal chromosome abnormalities with greater than 99% certainty.  Results can be compromised by maternal cell contamination or mosaicism, and are limited by the resolution of cytogenetic G-banding technology.  There is no screening nor diagnostic test that can detect all forms of birth defects or mental disability.     It was a pleasure to be involved with [facility]. Face-to-face time of the meeting was 30 minutes.    Tara Streeter MS, [facility]  Licensed Genetic Counselor  [facility]  Maternal Fetal Medicine  [email or web address]  [phone]

## 2022-11-29 LAB
CULTURE HARVEST COMPLETE DATE: NORMAL

## 2022-11-30 ENCOUNTER — TELEPHONE (OUTPATIENT)
Dept: MATERNAL FETAL MEDICINE | Facility: CLINIC | Age: 42
End: 2022-11-30

## 2022-11-30 LAB
INTERPRETATION: NORMAL
ISCN: NORMAL
METHODS: NORMAL

## 2022-11-30 NOTE — TELEPHONE ENCOUNTER
November 30, 2022    I called Earnestine to discuss the results of her chromosome analysis.     Results are normal (46,XX). There were no numerical or structural chromosomal differences identified.     At this time, we cannot rule out the possibility that Earnestine's partner could harbor a chromosomal rearrangement.     I encouraged Earnestine to call me with any questions or if she would like to coordinate testing for her partner.     Tara Streeter MS, [facility]  Licensed Genetic Counselor  [facility]  Maternal Fetal Medicine  [email or web address]  [phone]

## 2022-12-05 NOTE — TELEPHONE ENCOUNTER
December 5, 2022    Earnestine called me to discuss her karyotype results. We reviewed that since the results were negative/normal, we have no identified an underlying genetic cause for her history of losses at this time.     Earnestine would like to coordinate testing for her partner: Ike Marie 7/1/73. He will call me tomorrow afternoon to be consented and to set up a time for him to get his blood drawn.     Tara Streeter MS, [facility]  Licensed Genetic Counselor  [facility]  Maternal Fetal Medicine  [email or web address]  [phone]

## 2023-01-01 ENCOUNTER — MYC MEDICAL ADVICE (OUTPATIENT)
Dept: FAMILY MEDICINE | Facility: CLINIC | Age: 43
End: 2023-01-01

## 2023-01-02 NOTE — TELEPHONE ENCOUNTER
Could she come in Tuesday 1/3? (Tomorrow). I have patient hold spots. Please schedule for 40min given high risk/complex pt care. She could arrive at 1010 for a 10:20am appointment?

## 2023-01-02 NOTE — TELEPHONE ENCOUNTER
See Beverly from patient needing PCP review. Hx of recurrent miscarriage. Pt's LMP 12/02/2022. When would you like to see pt?    Please respond directly to patient if at all able.    Gloria Natarajan RN  [facility]

## 2023-01-03 ENCOUNTER — DOCUMENTATION ONLY (OUTPATIENT)
Dept: MATERNAL FETAL MEDICINE | Facility: CLINIC | Age: 43
End: 2023-01-03

## 2023-01-03 ENCOUNTER — OFFICE VISIT (OUTPATIENT)
Dept: FAMILY MEDICINE | Facility: CLINIC | Age: 43
End: 2023-01-03
Payer: COMMERCIAL

## 2023-01-03 VITALS
DIASTOLIC BLOOD PRESSURE: 76 MMHG | RESPIRATION RATE: 16 BRPM | HEART RATE: 72 BPM | HEIGHT: 60 IN | BODY MASS INDEX: 26.84 KG/M2 | WEIGHT: 136.7 LBS | SYSTOLIC BLOOD PRESSURE: 102 MMHG

## 2023-01-03 DIAGNOSIS — R76.8 SS-A ANTIBODY POSITIVE: ICD-10-CM

## 2023-01-03 DIAGNOSIS — N91.2 AMENORRHEA: Primary | ICD-10-CM

## 2023-01-03 DIAGNOSIS — Z87.59 HISTORY OF PRETERM PREMATURE RUPTURE OF MEMBRANES (PPROM): ICD-10-CM

## 2023-01-03 DIAGNOSIS — N96 HISTORY OF RECURRENT MISCARRIAGES: ICD-10-CM

## 2023-01-03 DIAGNOSIS — M35.01 SJOGREN'S SYNDROME WITH KERATOCONJUNCTIVITIS SICCA (H): ICD-10-CM

## 2023-01-03 LAB
ALBUMIN MFR UR ELPH: 25.5 MG/DL (ref 1–14)
ALBUMIN SERPL BCG-MCNC: 4.6 G/DL (ref 3.5–5.2)
ALP SERPL-CCNC: 76 U/L (ref 35–104)
ALT SERPL W P-5'-P-CCNC: 26 U/L (ref 10–35)
ANION GAP SERPL CALCULATED.3IONS-SCNC: 14 MMOL/L (ref 7–15)
AST SERPL W P-5'-P-CCNC: 25 U/L (ref 10–35)
BILIRUB SERPL-MCNC: 0.6 MG/DL
BUN SERPL-MCNC: 12.2 MG/DL (ref 6–20)
CALCIUM SERPL-MCNC: 9.6 MG/DL (ref 8.6–10)
CHLORIDE SERPL-SCNC: 103 MMOL/L (ref 98–107)
CREAT SERPL-MCNC: 0.6 MG/DL (ref 0.51–0.95)
CREAT UR-MCNC: 220 MG/DL
DEPRECATED HCO3 PLAS-SCNC: 23 MMOL/L (ref 22–29)
GFR SERPL CREATININE-BSD FRML MDRD: >90 ML/MIN/1.73M2
GLUCOSE SERPL-MCNC: 98 MG/DL (ref 70–99)
HBA1C MFR BLD: 5.7 % (ref 0–5.6)
HCG INTACT+B SERPL-ACNC: 351 MIU/ML
POTASSIUM SERPL-SCNC: 4.4 MMOL/L (ref 3.4–5.3)
PROT SERPL-MCNC: 8.1 G/DL (ref 6.4–8.3)
PROT/CREAT 24H UR: 0.12 MG/MG CR (ref 0–0.2)
SODIUM SERPL-SCNC: 140 MMOL/L (ref 136–145)
TSH SERPL DL<=0.005 MIU/L-ACNC: 2.31 UIU/ML (ref 0.3–4.2)

## 2023-01-03 PROCEDURE — 84702 CHORIONIC GONADOTROPIN TEST: CPT | Performed by: FAMILY MEDICINE

## 2023-01-03 PROCEDURE — 86376 MICROSOMAL ANTIBODY EACH: CPT | Performed by: FAMILY MEDICINE

## 2023-01-03 PROCEDURE — 83036 HEMOGLOBIN GLYCOSYLATED A1C: CPT | Performed by: FAMILY MEDICINE

## 2023-01-03 PROCEDURE — 84443 ASSAY THYROID STIM HORMONE: CPT | Performed by: FAMILY MEDICINE

## 2023-01-03 PROCEDURE — 36415 COLL VENOUS BLD VENIPUNCTURE: CPT | Performed by: FAMILY MEDICINE

## 2023-01-03 PROCEDURE — 84156 ASSAY OF PROTEIN URINE: CPT | Performed by: FAMILY MEDICINE

## 2023-01-03 PROCEDURE — 99215 OFFICE O/P EST HI 40 MIN: CPT | Performed by: FAMILY MEDICINE

## 2023-01-03 PROCEDURE — 80053 COMPREHEN METABOLIC PANEL: CPT | Performed by: FAMILY MEDICINE

## 2023-01-03 RX ORDER — PREDNISOLONE ACETATE 10 MG/ML
SUSPENSION/ DROPS OPHTHALMIC
Status: ON HOLD | COMMUNITY
Start: 2022-09-07 | End: 2023-08-23

## 2023-01-03 NOTE — PROGRESS NOTES
Assessment/Plan:      Amenorrhea.  UPT was positive at home. Collect quant HCG today  High risk pregnancy and hx well known to me as her PCP. She desires to continue high risk OB care here with MFM following for imaging/AZ intervals/echo as indicated.     Approx Gestational age: 4w4d today based on LMP. Patient's last menstrual period was 2022 (exact date). with clinical KRISHNA of 2023.  Ovulation tracking was noted to be around day 10 after her LMP of note..   - Dating ultrasound ordered , may defer if MFM doing this at their site in the near future.   - MFM referral placed for her hx of AMA, Sjogrens (+SSA/SSB), recurrent miscarriages, hx GDMA1, pre term birth. Will need cervical ultrasounds.  Her karyotype has been normal. Ike hasn't had his done yet - not sure if covered by insurance.     - Prenatal vitamin recommended   - 1st trimester education reviewed: nutrition, smoking, alcohol & drug use and safe medications  - She will set up a first OB appointment at 8 weeks gestation given her hx of miscarriage so we have close follow up.   - All questions that were asked were answered.   - Call or return to clinic if severe cramping or abdominal pain or any vaginal bleeding     Recurrent pregnancy loss  - Has seen MFM   - TSH/TPO today  - maternal karyotype normal  - will need growth ultrasounds 28wk and 34wk    Hx of spontaneous  birth  MFM Recommendations:    Early ultrasound to confirm KRISHNA. (ordered)    Optimize modifiable risk factors: smoking cessation, avoidance of cocaine, maintenance of adequate nutrition.    Baseline transvaginal cervical length at 16 weeks' gestation with measurements every 2 weeks through 23 weeks.       Ultrasound-indicated cerclage should be considered if shortening <25 mm is diagnosed <24 weeks.    Administration of  corticosteroids if the patient is deemed to be at increased risk of imminent  delivery.    Clinical evaluation of  labor symptoms.        + SSA/SSB antibodies/Sjogren's syndrome  Symptoms stable; continue Rheumatologic meds (plaquenil, imuran). Understands risk for  lupus (rash and congenital heart block) The risk of developing heart block in offspring of women with anti-Ro/SSA antibodies in the absence of a prior birth with  lupus syndrome is approximately 1-2%.  It increases to 15-20% if a previous child was affected (baby Ike is not yet affected).    MFM Recommendations    Baseline creatinine, liver function tests, platelets and urine P/Cr ratio (ordered)   - Fetal Echos at 16, 20, 24 weeks with Pediatric Cardiology.  - RI interval with MFM at 18, 22 and 26 weeks.      Follow up: 3-4 weeks  40 minutes spent on the date of the encounter doing chart review, patient visit and documentation.    HISTORY OF PRESENT ILLNESS:  Earnestine is a 42 year old female presenting to the clinic today for amenorrhea and pregnancy confirmation.     Patient's last menstrual period was 2022 (exact date).    Home UPT? Yes   Contraceptive method previously: none; has been TTC  Menstrual hx: slightly irregular periods  Symptoms of pregnancy: breast tenderness, fatigue and nausea. No vaginal bleeding or abdominal pain since LMP.   If pregnant, pregnancy is: planned, desired    She has had 3 1st trimester miscarriages- notified us of this in 2022 and we started work up and referred her to [facility] for preconception counseling- note from 2022 reviewed in detail. She had a normal karyotype done but Ike's was not performed due to cost.    Miscarriages- all within 1st trimester:    (seen by OB at that time)  May 2022    To review: 2 pregnancies with prior partner; 1 pregnancy (baby Ike) with current . During that pregnancy: High risk pregnancy- AMA, Autoimmune hepatitis on Azathioprine, Sjogren's with + SSA/SSB bella on plaquenil followed by MFM for fetal heart rate monitoring (RI intervals and echos normal) through  "2nd trimester, and GDMA1 well controlled    patient did have  delivery at 34w3d (20)- patient presented to labor and delivery with prolonged premature rupture of membranes. She ultimately underwent an uncomplicated .      Follows with Rheumatology Dr. Neil for her Sjogren's/+SSA/SSB bella - on plaquenil, imuran      She's also on augmentin currently for sinus symptoms. (seen at urgent care 2023, note reviewed).       She has had 3 doses of COVID-vaccine but not the most recent bivalent vaccine.      Remainder of 12-point ROS is negative.    Social History     Social History Narrative    , 3 children    Non smoker    Rebecca Mckenna MD       VITALS:  Vitals:    23 0959   BP: 102/76   BP Location: Left arm   Patient Position: Sitting   Cuff Size: Adult Regular   Pulse: 72   Resp: 16   Weight: 62 kg (136 lb 11.2 oz)   Height: 1.53 m (5' 0.25\")     Wt Readings from Last 3 Encounters:   23 62 kg (136 lb 11.2 oz)   22 65 kg (143 lb 6.4 oz)   22 62.6 kg (138 lb)     Body mass index is 26.48 kg/m .    PHYSICAL EXAM:  Constitutional: healthy, alert and no distress  Head: Normocephalic. Atraumatic   Neck: Neck supple. No adenopathy.   Cardiovascular: Regular rate and rhythm. No murmurs, clicks gallops or rub  Respiratory: Lungs clear to auscultation. No wheezing or crackles present   Abdomen:  Abdomen soft, non-tender. BS normal. No masses, organomegaly  Musculoskeletal: extremities normal- no gross deformities noted and normal muscle tone  Skin: no suspicious lesions or rashes  Psychiatric: mentation appears normal and affect normal/bright    RECENT RESULTS  No results found for this or any previous visit (from the past 48 hour(s)).    MEDICATIONS:  Current Outpatient Medications   Medication Sig Dispense Refill     amoxicillin-clavulanate (AUGMENTIN) 875-125 MG tablet Take 1 tablet by mouth       azaTHIOprine (IMURAN) 50 MG tablet Take 50 mg by mouth daily       " hydroxychloroquine (PLAQUENIL) 200 MG tablet TAKE 1 & 1/2 TABLETS (300 MG) BY MOUTH ONCE DAILY.       hydrOXYzine (VISTARIL) 50 MG capsule Take 1-2 capsules ( mg) by mouth 3 times daily as needed for itching (Can take up to 100mg at night) 30 capsule 0     prednisoLONE acetate (PRED FORTE) 1 % ophthalmic suspension INSTILL 1 DROP LEFT EYE SIX TIMES A DAY       Prenatal Vit-Fe Fumarate-FA (PRENATAL MULTIVITAMIN W/IRON) 27-0.8 MG tablet Take 1 tablet by mouth daily       SUMAtriptan (IMITREX) 100 MG tablet Take 1 tablet (100 mg) by mouth at onset of headache for migraine 10 tablet 11         Rebecca Mckenna MD      Answers for HPI/ROS submitted by the patient on 1/3/2023  What is the reason for your visit today? : OB pregnancy  How many servings of fruits and vegetables do you eat daily?: 2-3  On average, how many sweetened beverages do you drink each day (Examples: soda, juice, sweet tea, etc.  Do NOT count diet or artificially sweetened beverages)?: 2  How many minutes a day do you exercise enough to make your heart beat faster?: 30 to 60  How many days a week do you exercise enough to make your heart beat faster?: 4  How many days per week do you miss taking your medication?: 0

## 2023-01-03 NOTE — PROGRESS NOTES
Addendum to MFM Consult on 11/17/22.    In reviewing the MFM consult from 11/17/22 we recommend the following plan of care for the pregnancy monitoring for fetal heart block in this patient with +SSA antibodies.    1) Fetal Echos at 16, 20, 24 weeks with Pediatric Cardiology.  2) P-R interval with MFM at 18, 22 and 26 weeks.    Buddy Mcclain MD  Maternal-Fetal Medicine

## 2023-01-04 LAB — THYROPEROXIDASE AB SERPL-ACNC: <10 IU/ML

## 2023-01-17 ENCOUNTER — NURSE TRIAGE (OUTPATIENT)
Dept: FAMILY MEDICINE | Facility: CLINIC | Age: 43
End: 2023-01-17

## 2023-01-17 ENCOUNTER — TELEPHONE (OUTPATIENT)
Dept: LAB | Facility: CLINIC | Age: 43
End: 2023-01-17

## 2023-01-17 DIAGNOSIS — O09.529 SUPERVISION OF HIGH-RISK PREGNANCY OF ELDERLY MULTIGRAVIDA: Primary | ICD-10-CM

## 2023-01-17 NOTE — TELEPHONE ENCOUNTER
Pt well known to me and she has had 3 prior miscarriages and familiar with early sx/signs of this.     1. Please provide brief education that spotting in pregnancy can be common but our concerns of course are to follow for any further spotting or bleeding due to concern for miscarriage. Generally I would encourage a visit if things are progressing, such as at urgent care/ER if excessive bleeding or clots using more than 1 pad every 2 hours.      2. Can she see me Friday at 10:40arrival for the same day spot so we can check in?    3. Also, I can place orders for HCG quant today, UA, wet prep if she wants to submit an EVISIT for these other concerns of itching/burning (vs being seen at urgent care or VV with me today); then we can reassess on Friday with a repeat HCG level if needed.     4. In the mean time: can she please call radiology to schedule her dating ultrasound sooner so we can check that everything is ok? It's currently 1/23; but we could have her move it up to this week if she calls [phone] to see about availability.      Provider Response to 2nd Level Triage Request    I have reviewed the RN documentation. My recommendation is:  as above

## 2023-01-17 NOTE — TELEPHONE ENCOUNTER
Nurse Triage SBAR    Is this a 2nd Level Triage? YES, LICENSED PRACTITIONER REVIEW IS REQUIRED    Situation:   41 yo female, 6 wks pregnant, LMP. 12/02  Spotting when wiping occurred after intercourse. Also burning and itching while urinating  Felt feverish last night, sweaty and clammy    Background:  Assessment:   1. ONSET: This AM    2. DESCRIPTION:     - SPOTTING: spotting, or pinkish / brownish mucous discharge; does not fill panty liner or pad     3. ABDOMINAL PAIN SEVERITY:    - MILD (1-3): doesn't interfere with normal activities, abdomen soft and not tender to touch   Feels like a vaginal infection itching, discomfort and discharge    4. PREGNANCY:       6 weeks LMS 12/02/2022    5. HEMODYNAMIC STATUS:       Weak, headaches    6. OTHER SYMPTOMS:      Last week yes. Online doctor last week gave cream, gave external cream    Protocol Recommended Disposition:   Home Care, See in Office Today, Go To ED/UCC Now (Or To Office With PCP Approval)    Recommendation: Routed to provider    Please advise    Does the patient meet one of the following criteria for ADS visit consideration? No      Reason for Disposition    Pale skin (pallor) of new-onset or worsening    Pain or burning with passing urine (urination)    SPOTTING after sexual intercourse (single or brief episode)    Additional Information    Negative: Shock suspected (e.g., cold/pale/clammy skin, too weak to stand, low BP, rapid pulse)    Negative: Difficult to awaken or acting confused (e.g., disoriented, slurred speech)    Negative: Passed out (i.e., fainted, collapsed and was not responding)    Negative: Sounds like a life-threatening emergency to the triager    Negative: Vaginal bleeding and pregnant 20 or more weeks    Negative: Not pregnant or pregnancy status unknown    Negative: SEVERE abdominal pain (e.g., excruciating)    Negative: SEVERE vaginal bleeding (e.g., soaking 2 pads or tampons per hour and present 2 or more hours; 1 menstrual cup every  2 hours)    Negative: SEVERE dizziness (e.g., unable to stand, requires support to walk, feels like passing out)    Negative: MODERATE vaginal bleeding (i.e., soaking 1 pad) and present > 6 hours    Negative: MODERATE vaginal bleeding (i.e., soaking 1 pad / hour, clots) and pregnant > 12 weeks    Negative: Passed tissue (e.g., gray-white)    Negative: Shoulder pain    Negative: Constant abdominal pain lasting > 1 hour    Negative: Fever > 100.4 F (38.0 C)    Negative: Prior history of 'ectopic pregnancy' or previous tubal surgery (e.g., tubal ligation)    Negative: Has IUD    Protocols used: PREGNANCY - VAGINAL BLEEDING LESS THAN 20 WEEKS MARY Armijo RN  [facility]

## 2023-01-17 NOTE — PROGRESS NOTES
Patient is on our lab schedule for Friday. There are no orders in her chart. Appt notes say 1 hour glucose. Please review and place orders as needed. Thanks

## 2023-01-17 NOTE — TELEPHONE ENCOUNTER
RN called patient back and relayed provider recommendation. Patient was agreeable and plans to follow al recommendations.    MONE Armijo  [facility]

## 2023-01-20 ENCOUNTER — OFFICE VISIT (OUTPATIENT)
Dept: FAMILY MEDICINE | Facility: CLINIC | Age: 43
End: 2023-01-20
Payer: COMMERCIAL

## 2023-01-20 ENCOUNTER — HOSPITAL ENCOUNTER (OUTPATIENT)
Dept: ULTRASOUND IMAGING | Facility: CLINIC | Age: 43
Discharge: HOME | End: 2023-01-20
Attending: FAMILY MEDICINE | Admitting: FAMILY MEDICINE
Payer: COMMERCIAL

## 2023-01-20 ENCOUNTER — APPOINTMENT (OUTPATIENT)
Dept: LAB | Facility: CLINIC | Age: 43
End: 2023-01-20
Payer: COMMERCIAL

## 2023-01-20 VITALS
BODY MASS INDEX: 26.84 KG/M2 | WEIGHT: 138.6 LBS | DIASTOLIC BLOOD PRESSURE: 68 MMHG | HEART RATE: 64 BPM | SYSTOLIC BLOOD PRESSURE: 108 MMHG

## 2023-01-20 DIAGNOSIS — R73.03 PREDIABETES: ICD-10-CM

## 2023-01-20 DIAGNOSIS — M35.01 SJOGREN'S SYNDROME WITH KERATOCONJUNCTIVITIS SICCA (H): ICD-10-CM

## 2023-01-20 DIAGNOSIS — N96 HISTORY OF RECURRENT MISCARRIAGES: ICD-10-CM

## 2023-01-20 DIAGNOSIS — R76.8 POSITIVE ANA (ANTINUCLEAR ANTIBODY): ICD-10-CM

## 2023-01-20 DIAGNOSIS — R76.8 SS-A ANTIBODY POSITIVE: Primary | ICD-10-CM

## 2023-01-20 DIAGNOSIS — O09.899 HISTORY OF PRETERM DELIVERY, CURRENTLY PREGNANT: ICD-10-CM

## 2023-01-20 DIAGNOSIS — Z87.59 HISTORY OF PRETERM PREMATURE RUPTURE OF MEMBRANES (PPROM): ICD-10-CM

## 2023-01-20 DIAGNOSIS — N91.2 AMENORRHEA: ICD-10-CM

## 2023-01-20 DIAGNOSIS — Z86.32 HISTORY OF DIET CONTROLLED GESTATIONAL DIABETES MELLITUS (GDM): ICD-10-CM

## 2023-01-20 DIAGNOSIS — R76.8 SS-A ANTIBODY POSITIVE: ICD-10-CM

## 2023-01-20 DIAGNOSIS — B37.31 YEAST INFECTION OF THE VAGINA: ICD-10-CM

## 2023-01-20 DIAGNOSIS — O09.529 SUPERVISION OF HIGH-RISK PREGNANCY OF ELDERLY MULTIGRAVIDA: Primary | ICD-10-CM

## 2023-01-20 LAB
ALBUMIN UR-MCNC: NEGATIVE MG/DL
APPEARANCE UR: CLEAR
BILIRUB UR QL STRIP: NEGATIVE
CLUE CELLS: ABNORMAL
COLOR UR AUTO: YELLOW
GLUCOSE 1H P 50 G GLC PO SERPL-MCNC: 144 MG/DL (ref 70–129)
GLUCOSE UR STRIP-MCNC: NEGATIVE MG/DL
HCG INTACT+B SERPL-ACNC: ABNORMAL MIU/ML
HGB UR QL STRIP: NEGATIVE
KETONES UR STRIP-MCNC: NEGATIVE MG/DL
LEUKOCYTE ESTERASE UR QL STRIP: NEGATIVE
NITRATE UR QL: NEGATIVE
PH UR STRIP: 6 [PH] (ref 5–8)
SP GR UR STRIP: <=1.005 (ref 1–1.03)
TRICHOMONAS, WET PREP: ABNORMAL
UROBILINOGEN UR STRIP-ACNC: 0.2 E.U./DL
WBC'S/HIGH POWER FIELD, WET PREP: ABNORMAL
YEAST, WET PREP: PRESENT

## 2023-01-20 PROCEDURE — 36415 COLL VENOUS BLD VENIPUNCTURE: CPT | Performed by: FAMILY MEDICINE

## 2023-01-20 PROCEDURE — 87210 SMEAR WET MOUNT SALINE/INK: CPT | Performed by: FAMILY MEDICINE

## 2023-01-20 PROCEDURE — 81003 URINALYSIS AUTO W/O SCOPE: CPT | Performed by: FAMILY MEDICINE

## 2023-01-20 PROCEDURE — 99207 PR COMPLICATED OB VISIT: CPT | Performed by: FAMILY MEDICINE

## 2023-01-20 PROCEDURE — 76801 OB US < 14 WKS SINGLE FETUS: CPT

## 2023-01-20 PROCEDURE — 82950 GLUCOSE TEST: CPT | Performed by: FAMILY MEDICINE

## 2023-01-20 PROCEDURE — 84702 CHORIONIC GONADOTROPIN TEST: CPT | Performed by: FAMILY MEDICINE

## 2023-01-20 PROCEDURE — 99214 OFFICE O/P EST MOD 30 MIN: CPT | Performed by: FAMILY MEDICINE

## 2023-01-20 RX ORDER — MICONAZOLE NITRATE 20 MG/G
CREAM TOPICAL 2 TIMES DAILY
Status: CANCELLED | OUTPATIENT
Start: 2023-01-20

## 2023-01-20 NOTE — PROGRESS NOTES
Office visit pertinent to early OB care  7w0d  Estimated Date of Delivery: Sep 8, 2023    Chief Complaint   Patient presents with     Prenatal Care     Follow up, no new concerns       S: On Tuesday (3d ago) she has spotting a bit when wiping; stopped on Wednesday and started again yesterday on Thursday. Minimal spotting and only when she is wiping; pink. Not using a pad.    Had her dating ultrasound today which went well    Came early for a 1hr GTT based on A1c 5.7% at UPT confirmation visit; 1hr Glucose abnormal at 144.  Understands plan now for 3hr fasting GTT early next week.     Wet prep also + for yeast today and she will start OTC monitstat  UA clear      Not yet feeling  fetal movement.   No vaginal bleeding, cramping, LOF.   No headaches, vision change, RUQ abdominal pain, LE swelling.       EXAM: US OB <14 WEEKS WITH TRANSVAGINAL SINGLE  LOCATION: [facility]  DATE/TIME: 1/20/2023 7:50 AM     INDICATION: Confirm dating. Assess cervical length.  COMPARISON: CT abdomen pelvis 08/08/2018.  TECHNIQUE: Transabdominal scans were performed. Endovaginal ultrasound was performed to better visualize the embryo.     FINDINGS:  UTERUS: Measures 11.1 x 6.1 x 4.9 cm and contains a normal appearing gestational sac, yolk sac, and embryo. A 0.7 x 0.6 x 0.5 cm intramural fibroid is present within the right mid uterus. Cervical length is 3 cm.  CRL: Measures 1 cm, equals 7 weeks 1 day.  FETAL HEART RATE: 143 bpm.  AMNIOTIC FLUID: Normal.  PLACENTA: Not yet formed. No evidence for sub-chorionic hemorrhage.     RIGHT OVARY: Measures 2.9 x 2.1 x 1.3 cm and contains a small corpus luteal cyst.  LEFT OVARY: Normal, measuring 2.2 x 2.0 x 2.3 cm.     No free pelvic fluid.                                                                      IMPRESSION:   1.  Single living intrauterine gestation at 7 weeks 1 day, EDC 09/07/2023.  2.  Cervix measures 3 cm in length.      O: Vitals reviewed, see prenatal  flowsheet for measurements.   appears well, no acute distress.  Normal respiratory effort.  Abdomen is soft. No LE swelling.     A/P:     Supervision of high-risk pregnancy of elderly multigravida  - MFM referral placed for her hx of AMA, Sjogrens (+SSA/SSB), recurrent miscarriages, hx GDMA1 (now with pregestational prediabetes), pre term birth. Will need cervical ultrasounds.  Her karyotype has been normal. Ike hasn't had his done yet - not sure if covered by insurance.      Recurrent pregnancy loss  Recent spotting; light. Reassuring dating ultrasound done today. No evidence for CHERYL  - Has seen MFM for pre pregnancy consult in Nov.   - TSH/TPO done on 1/3/23 and both are negative/normal range  - maternal karyotype normal  - will need growth ultrasounds 28wk and 34wk     Hx of spontaneous  birth  MFM Recommendations:    Early ultrasound to confirm KRISHNA. (done)    Optimize modifiable risk factors: smoking cessation, avoidance of cocaine, maintenance of adequate nutrition.    Baseline transvaginal cervical length at 16 weeks' gestation with measurements every 2 weeks through 23 weeks.    cervix 30mm at 7wk dating ultrasound    Ultrasound-indicated cerclage should be considered if shortening <25 mm is diagnosed <24 weeks.    Administration of  corticosteroids if the patient is deemed to be at increased risk of imminent  delivery.    Clinical evaluation of  labor symptoms.         + SSA/SSB antibodies/Sjogren's syndrome  Symptoms stable; continue Rheumatologic meds (plaquenil, imuran). Understands risk for  lupus (rash and congenital heart block) The risk of developing heart block in offspring of women with anti-Ro/SSA antibodies in the absence of a prior birth with  lupus syndrome is approximately 1-2%.  It increases to 15-20% if a previous child was affected (baby Ike is not yet affected).    MFM Recommendations    Baseline creatinine, liver function tests, platelets  and urine P/Cr ratio (ordered)   - Fetal Echos at 16, 20, 24 weeks with Pediatric Cardiology.  - MD interval with MFM at 18, 22 and 26 weeks.         COVID-19 counseling:   - recommend iron supplementation due to blood shortage  - avoid travel, social distancing + hand hygiene, avoid visitors at home  - current breastfeeding recommendations  - team based prenatal care by our [facility] group  - delivering physician may change  - visitor policy for imaging tests, office visits & L&D  - what to do if symptoms ([email or web address] or call the triage team at [phone])    Follow up: 4 weeks     Rebecca Mckenna MD

## 2023-01-23 ENCOUNTER — LAB (OUTPATIENT)
Dept: LAB | Facility: CLINIC | Age: 43
End: 2023-01-23
Payer: COMMERCIAL

## 2023-01-23 ENCOUNTER — APPOINTMENT (OUTPATIENT)
Dept: NEUROLOGY | Facility: CLINIC | Age: 43
End: 2023-01-23

## 2023-01-23 DIAGNOSIS — R73.03 PREDIABETES: ICD-10-CM

## 2023-01-23 LAB
GESTATIONAL GTT 1 HR POST DOSE: 197 MG/DL (ref 60–179)
GESTATIONAL GTT 2 HR POST DOSE: 203 MG/DL (ref 60–154)
GESTATIONAL GTT 3 HR POST DOSE: 101 MG/DL (ref 60–139)
GLUCOSE P FAST SERPL-MCNC: 101 MG/DL (ref 60–94)

## 2023-01-23 PROCEDURE — 82952 GTT-ADDED SAMPLES: CPT

## 2023-01-23 PROCEDURE — 36415 COLL VENOUS BLD VENIPUNCTURE: CPT

## 2023-01-23 PROCEDURE — 82951 GLUCOSE TOLERANCE TEST (GTT): CPT

## 2023-01-24 ENCOUNTER — TELEPHONE (OUTPATIENT)
Dept: FAMILY MEDICINE | Facility: CLINIC | Age: 43
End: 2023-01-24

## 2023-01-24 ENCOUNTER — TELEPHONE (OUTPATIENT)
Dept: NURSING | Facility: CLINIC | Age: 43
End: 2023-01-24

## 2023-01-24 ENCOUNTER — OFFICE VISIT (OUTPATIENT)
Dept: FAMILY MEDICINE | Facility: CLINIC | Age: 43
End: 2023-01-24
Payer: COMMERCIAL

## 2023-01-24 VITALS
RESPIRATION RATE: 14 BRPM | HEART RATE: 87 BPM | DIASTOLIC BLOOD PRESSURE: 83 MMHG | OXYGEN SATURATION: 99 % | TEMPERATURE: 97.9 F | SYSTOLIC BLOOD PRESSURE: 130 MMHG

## 2023-01-24 DIAGNOSIS — Z11.52 ENCOUNTER FOR SCREENING FOR COVID-19: Primary | ICD-10-CM

## 2023-01-24 DIAGNOSIS — Z33.1 PREGNANT STATE, INCIDENTAL: ICD-10-CM

## 2023-01-24 DIAGNOSIS — U07.1 INFECTION DUE TO 2019 NOVEL CORONAVIRUS: Primary | ICD-10-CM

## 2023-01-24 DIAGNOSIS — R05.1 ACUTE COUGH: ICD-10-CM

## 2023-01-24 DIAGNOSIS — Z20.822 EXPOSURE TO 2019 NOVEL CORONAVIRUS: ICD-10-CM

## 2023-01-24 LAB — SARS-COV-2 RNA RESP QL NAA+PROBE: POSITIVE

## 2023-01-24 PROCEDURE — U0003 INFECTIOUS AGENT DETECTION BY NUCLEIC ACID (DNA OR RNA); SEVERE ACUTE RESPIRATORY SYNDROME CORONAVIRUS 2 (SARS-COV-2) (CORONAVIRUS DISEASE [COVID-19]), AMPLIFIED PROBE TECHNIQUE, MAKING USE OF HIGH THROUGHPUT TECHNOLOGIES AS DESCRIBED BY CMS-2020-01-R: HCPCS | Performed by: PHYSICIAN ASSISTANT

## 2023-01-24 PROCEDURE — U0005 INFEC AGEN DETEC AMPLI PROBE: HCPCS | Performed by: PHYSICIAN ASSISTANT

## 2023-01-24 PROCEDURE — 99213 OFFICE O/P EST LOW 20 MIN: CPT | Mod: CS | Performed by: PHYSICIAN ASSISTANT

## 2023-01-24 NOTE — TELEPHONE ENCOUNTER
"Earnestine is 7w4d pregnant.  She is also on 2 immunosuppressant drugs to control Sjogren's (Humira & \"high dose\" Plaqenil)    She has tested Covid+ and is interested in Antiviral tx    COVID Positive/Requesting COVID treatment    Patient is positive for COVID and requesting treatment options.    Date of positive COVID test (PCR or at home)? Home, Tue, 1/24/23  Current COVID symptoms: fever or chills, cough, fatigue, muscle or body aches, headache, sore throat, congestion or runny nose and nausea or vomiting  Date COVID symptoms began: Mon, 1/23/23    Message should be routed to clinic RN pool. Best phone number to use for call back: [phone]    Preferred Pharmacy:  [id number] (inside [employer])  [address] [place] [address]  Phone - [phone]    Violet Johnson RN  [facility]        "

## 2023-01-24 NOTE — PATIENT INSTRUCTIONS
How can I protect others? Discharge Instructions for COVID-19 precaustions:  If you have symptoms (fever, cough, body aches or trouble breathing):  Stay home and away from others (self-isolate) until:  At least 10 days have passed since your symptoms started. And   You've had no fever--and no medicine that reduces fever--for 1 full day (24 hours). And   Your other symptoms have resolved (gotten better) OR you have a negative covid test.

## 2023-01-24 NOTE — PROGRESS NOTES
Patient presents with:  Cough: Has cough fever runny nose  for 3 days had exposure to daughter who has COVID is 8 weeks pregnant      Clinical Decision Making:  Patient had exposure with her daughter with COVID-19 and is now symptomatic.  She requested screening testing in the office today. COVID-19 screening test is pending.  Symptomatic care was gone over. Expected course of resolution and indication for return was gone over and questions were answered to patient/parent's satisfaction before discharge.        ICD-10-CM    1. Encounter for screening for COVID-19  Z11.52       2. Acute cough  R05.1 Symptomatic COVID-19 Virus (Coronavirus) by PCR Nose      3. Pregnant state, incidental  Z33.1       4. Exposure to 2019 novel coronavirus  Z20.822           Patient Instructions   How can I protect others? Discharge Instructions for COVID-19 precaustions:  If you have symptoms (fever, cough, body aches or trouble breathing):    Stay home and away from others (self-isolate) until:  ? At least 10 days have passed since your symptoms started. And   ? You've had no fever--and no medicine that reduces fever--for 1 full day (24 hours). And   Your other symptoms have resolved (gotten better) OR you have a negative covid test.        HPI:  Earnestine Guillen is a 42 year old female who presents today for evaluation 3-day history of cold-like symptoms with cough congestion fever headache temperature max of 102 degrees taken yesterday at home with chills and 2 episodes of emesis but has not continued with emesis no nausea or diarrhea currently.  Patient is currently pregnant with an estimated due date of 2023.    History obtained from chart review and the patient.    Problem List:  2020:  (normal spontaneous vaginal delivery)  2020: Pregnant  2020:  premature rupture of membranes (PPROM) at 34w2d  2020: Multigravida of advanced maternal age   2019: Diet controlled gestational diabetes mellitus  (GDM) in   second trimester  2019-10: Placenta previa in second trimester  2019-09: SS-A and SS-B antibody positive  2019-09: Sjogren's syndrome (H)iwth ++ SSA/SSB  2019-08: Thrombocytopenia affecting pregnancy (H)  2019-06: Need for hepatitis B vaccination  2019-06: Supervision of high-risk pregnancy of elderly multigravida  2018-09: Hepatitis, autoimmune (H)  2018-08: Positive EMELIA (antinuclear antibody)  2018-07: Migraine with aura and without status migrainosus, not   intractable  2017-07: ASCUS with positive high risk HPV cervical  Abnormal LFTs s/t autoimmune hepatitis  SIRS (systemic inflammatory response syndrome) (H)      Past Medical History:   Diagnosis Date     Acute pyelonephritis      Anemia      Autoimmune hepatitis (H) 2018     Cervicalgia      Diabetes mellitus (H)     DGDM     Migraine      PID (acute pelvic inflammatory disease) 2018     Transaminitis        Social History     Tobacco Use     Smoking status: Never     Smokeless tobacco: Never   Substance Use Topics     Alcohol use: Not on file       Review of Systems  As above in HPI otherwise negative.    Vitals:    01/24/23 1135   BP: 130/83   Pulse: 87   Resp: 14   Temp: 97.9  F (36.6  C)   TempSrc: Oral   SpO2: 99%       General: Patient is resting comfortably no acute distress is afebrile  Patient does not appear acutely ill toxic or dehydrated  HEENT: Head is normocephalic atraumatic   eyes are PERRL EOMI sclera anicteric   TMs are clear bilaterally  Throat is clear  No cervical lymphadenopathy present  LUNGS: Clear to auscultation bilaterally normal respiratory effort and excursion  HEART: Regular rate and rhythm  Skin: Without rash non-diaphoretic    Physical Exam    At the end of the encounter, I discussed results, diagnosis, medications. Discussed red flags for immediate return to clinic/ER, as well as indications for follow up if no improvement. Patient understood and agreed to plan. Patient was stable for discharge.

## 2023-01-24 NOTE — TELEPHONE ENCOUNTER
COVID Positive/Requesting COVID treatment    Patient is positive for COVID and requesting treatment options.    Date of positive COVID test (PCR or at home)? 01/24/12023    Current COVID symptoms: cough, fever, runny nose, headache.    Date COVID symptoms began: 01/19/2023      Pt was seen at [facility] today and they took a PCR test and it is still pending. Pt took a at home test and it was positive. Pt just wanted to inform PCP that she tested positive for covid 19 today. Pt is wondering if she would qualify for the covid treatment medication?    Message should be routed to clinic RN pool. Best phone number to use for call back: [phone].    Shakira Llamas

## 2023-01-24 NOTE — TELEPHONE ENCOUNTER
Patient is now past point of treatment window. Huddled with provider for review.    RN called patient and reviewed.     Using humidifier recommending cough drops, honey warm fluids, Tylenol and Ibuprofen. Staying hydrated.    MONE Armijo  [facility]

## 2023-01-25 ENCOUNTER — NURSE TRIAGE (OUTPATIENT)
Dept: NURSING | Facility: CLINIC | Age: 43
End: 2023-01-25

## 2023-01-25 ENCOUNTER — TELEPHONE (OUTPATIENT)
Dept: FAMILY MEDICINE | Facility: CLINIC | Age: 43
End: 2023-01-25

## 2023-01-25 ENCOUNTER — VIRTUAL VISIT (OUTPATIENT)
Dept: PEDIATRICS | Facility: CLINIC | Age: 43
End: 2023-01-25
Payer: COMMERCIAL

## 2023-01-25 DIAGNOSIS — U07.1 INFECTION DUE TO 2019 NOVEL CORONAVIRUS: Primary | ICD-10-CM

## 2023-01-25 PROCEDURE — 99207 PR DROP WITH A PROCEDURE: CPT | Mod: CS | Performed by: PHYSICIAN ASSISTANT

## 2023-01-25 NOTE — TELEPHONE ENCOUNTER
Writer reviewed pt's chart. 01/25/2023 Pt called RN triage, and was triaged by FNA. Pt was scheduled for an appt (01/25/2023 AM) to determine eligibility for antiviral therapy. No further action needed.    Gloria Natarajan RN

## 2023-01-25 NOTE — TELEPHONE ENCOUNTER
Pt needing letter for work noting positive COVID test on 01/24/2023.    Letter was sent to central office to mail out on 01/25/2023.    Gloria Natarajan RN

## 2023-01-25 NOTE — TELEPHONE ENCOUNTER
Writer called pt. Relayed PCP's message below. Pt denied further concerns. Pt requested a copy of their test results and a letter to give to their employer regarding their positive COVID19 test be mailed to pt.    Writer placed letter and lab results in enveloped to be sent to central office to be mailed out to pt.    Gloria Natarajan RN

## 2023-01-25 NOTE — LETTER
January 25, 2023      Earnestine AMIN Wilmer  [address] [address]        To Whom It May Concern:    Earnestine AMIN Wilmer tested positive for COVID-19 on 01/24/2023. She may return to work on 01/30/2023.      Sincerely,        Rebecca Mckenna MD

## 2023-01-25 NOTE — TELEPHONE ENCOUNTER
See other encounter; Paxlovid sent after reviewing her chart/hx as she is well known to me and high risk conditions and seen recently.

## 2023-01-25 NOTE — TELEPHONE ENCOUNTER
"Patient is calling and states that she tested positive for covid today 1/24/2023.  Symptoms are fever, chills, cough, runny nose, headache, vomiting and diarrhea.  Patient is currently pregnant and high risk.  Chest pain is present when coughing.  Patient is seven weeks pregnant and has an underlying disease Sjogren's syndrome.  Symptoms started on Monday Jan 23, 2023.   FNA transferred patient through to covid scheduling as PCP/Clinic did not prescribed medication for patient.      Coronavirus (COVID-19) Notification    Caller Name (Patient, parent, daughter/son, grandparent, etc)  Earnestine Guillen    Reason for call  Notify of Positive Coronavirus (COVID-19) lab results, assess symptoms,  review  [employer] recommendations    Lab Result    Lab test:  2019-nCoV rRt-PCR or SARS-CoV-2 PCR    Oropharyngeal AND/OR nasopharyngeal swabs is POSITIVE for 2019-nCoV RNA/SARS-COV-2 PCR (COVID-19 virus)    Brief introduction script  Introduce self then review script:  \"I am calling on behalf of [facility].  We were notified that your Coronavirus test (COVID-19) for was POSITIVE for the virus.\"    Gather patient reported symptoms   Assessment   Current Symptoms at time of phone call, reported by patient: (if no symptoms, document No symptoms] fever, chills, cough, runny nose, headache, vomiting and diarrhea.  Patient is currently pregnant and high risk.  Chest pain is present when coughing.  Patient is seven weeks pregnant and has an underlying disease Sjogren's      Date of Symptom(s) onset (if applicable) Jan 23, 2023     If at time of call, Patients symptoms hare worsened, the Patient should contact 911 or have someone drive them to Emergency Dept promptly:      If Patient calling 911, inform 911 personal that you have tested positive for the Coronavirus (COVID-19).  Place mask on and await 911 to arrive.    If Emergency Dept, If possible, please have another adult drive you to the Emergency Dept but you need to " wear mask when in contact with other people.      Monoclonal Antibody Administration    You may be eligible to receive a new treatment with a monoclonal antibody for preventing hospitalization in patients at high risk for complications from COVID-19. This medication is still experimental and available on a limited basis; it is given through an IV and must be given at an infusion center. Please note that not all people who are eligible will receive the medication since it is in limited supply.  Is the patient symptomatic and onset of symptoms within the last 7 days?  Yes  Is the patient interested in a visit with a provider to discuss treatment options?: Yes  Is the patient seen at [facility]?  No: Warm transfer caller to [phone] to be scheduled with a virtual urgent provider.  During transfer, instruct  on appropriate time frame for visit     Review information with Patient    Your result was positive. This means you have COVID-19 (coronavirus).      How can I protect others?    These guidelines are for isolating before returning to work, school or .       If you DO have symptoms:  o Stay home and away from others  - For at least 5 days after your symptoms started, AND   - You are fever free for 24 hours (with no medicine that reduces fever), AND  - Your other symptoms are better.  o Wear a mask for 10 full days any time you are around others.    If you DON'T have symptoms:  o Stay at home and away from others for at least 5 days after your positive test.  o Wear a mask for 10 full days any time you are around others.    There may be different guidelines for healthcare facilities. Please check with the specific sites before arriving.     If you've been told by a doctor that you were severely ill with COVID-19 or are immunocompromised, you should isolate for at least 10 days.    You should not go back to work until you meet the guidelines above for ending your home isolation. You don't  need to be retested for COVID-19 before going back to work--studies show that you won't spread the virus if it's been at least 10 days since your symptoms started (or 20 days, if you have a weak immune system).    Employers, schools, and daycares: This is an official notice for this person's medical guidelines for returning in-person. They must meet the above guidelines before going back to work, school, or  in person.    You will receive a positive COVID-19 letter via [employer] or the mail soon with additional self-care information.      Would you like me to review some of that information with you now?  Yes    How can I take care of myself?      Get lots of rest. Drink extra fluids (unless a doctor has told you not to).      Take Tylenol (acetaminophen) for fever or pain. If you have liver or kidney problems, ask your family doctor if it's okay to take Tylenol.     Take either:     650 mg (two 325 mg pills) every 4 to 6 hours, or     1,000 mg (two 500 mg pills) every 8 hours as needed.     Note: Do not take more than 3,000 mg in one day. Acetaminophen is found in many medicines (both prescribed and over-the-counter medicines). Read all labels to be sure you don't take too much.    For children, check the Tylenol bottle for the right dose (based on their age or weight).      If you have other health problems (like cancer, heart failure, an organ transplant or severe kidney disease): Call your specialty clinic if you don't feel better in the next 2 days.      Know when to call 911: Emergency warning signs include:    Trouble breathing or shortness of breath    Pain or pressure in the chest that doesn't go away    Feeling confused like you haven't felt before, or not being able to wake up    Bluish-colored lips or face        If you were tested for an upcoming procedure, please contact your provider for next steps.     Jennifer Dodson RN    Reason for Disposition    [1] Continuous (nonstop) coughing interferes  with work or school AND [2] no improvement using cough treatment per Care Advice    Additional Information    Negative: SEVERE difficulty breathing (e.g., struggling for each breath, speaks in single words)    Negative: Difficult to awaken or acting confused (e.g., disoriented, slurred speech)    Negative: Bluish (or gray) lips or face now    Negative: Shock suspected (e.g., cold/pale/clammy skin, too weak to stand, low BP, rapid pulse)    Negative: Sounds like a life-threatening emergency to the triager    Negative: SEVERE or constant chest pain or pressure  (Exception: Mild central chest pain, present only when coughing.)    Negative: MODERATE difficulty breathing (e.g., speaks in phrases, SOB even at rest, pulse 100-120)    Negative: Headache and stiff neck (can't touch chin to chest)    Negative: Oxygen level (e.g., pulse oximetry) 90 percent or lower    Negative: Chest pain or pressure  (Exception: MILD central chest pain, present only when coughing)    Negative: Patient sounds very sick or weak to the triager    Negative: MILD difficulty breathing (e.g., minimal/no SOB at rest, SOB with walking, pulse <100)    Negative: Fever > 103 F (39.4 C)    Negative: [1] Fever > 101 F (38.3 C) AND [2] over 60 years of age    Negative: [1] Fever > 100.0 F (37.8 C) AND [2] bedridden (e.g., nursing home patient, CVA, chronic illness, recovering from surgery)    Negative: [1] HIGH RISK for severe COVID complications (e.g., weak immune system, age > 64 years, obesity with BMI of 30 or higher, pregnant, chronic lung disease or other chronic medical condition) AND [2] COVID symptoms (e.g., cough, fever)  (Exceptions: Already seen by PCP and no new or worsening symptoms.)    Negative: [1] HIGH RISK patient AND [2] influenza is widespread in the community AND [3] ONE OR MORE respiratory symptoms: cough, sore throat, runny or stuffy nose    Negative: [1] HIGH RISK patient AND [2] influenza exposure within the last 7 days AND [3]  ONE OR MORE respiratory symptoms: cough, sore throat, runny or stuffy nose    Negative: Oxygen level (e.g., pulse oximetry) 91 to 94 percent    Negative: [1] COVID-19 infection suspected by caller or triager AND [2] mild symptoms (cough, fever, or others) AND [3] negative COVID-19 rapid test    Negative: Fever present > 3 days (72 hours)    Negative: [1] Fever returns after gone for over 24 hours AND [2] symptoms worse or not improved    Protocols used: CORONAVIRUS (COVID-19) DIAGNOSED OR [id number]

## 2023-01-25 NOTE — TELEPHONE ENCOUNTER
"Earnestine reports that there was a misunderstanding or miscommunication on the onset of her Covid symptoms.     She states that her symptoms started yesterday, Mon, 1/23/23  ------------------------------------------------------------------------------------  Earnestine is 7w4d pregnant.  She is also on 2 immunosuppressant drugs to control Sjogren's (Humira & \"high dose\" Plaqenil)    She has tested Covid+ and is interested in Antiviral tx    COVID Positive/Requesting COVID treatment    Patient is positive for COVID and requesting treatment options.    Date of positive COVID test (PCR or at home)? Home, Tue, 1/24/23  Current COVID symptoms: fever or chills, cough, fatigue, muscle or body aches, headache, sore throat, congestion or runny nose and nausea or vomiting  Date COVID symptoms began: Mon, 1/23/23    Message should be routed to clinic RN pool. Best phone number to use for call back: [phone]    Preferred Pharmacy:  [id number] (inside [employer])  [address] [place] [address] [place] [address]  Phone - [phone]    Violet Johnson RN  [facility]  "

## 2023-01-25 NOTE — LETTER
January 25, 2023      Earnestine Guillen  [address] [address]        To Whom It May Concern:    Earnestine Guillen was seen in our clinic. She may return to {WORK OR SCHOOL OR :100967} without restrictions.      Sincerely,        Rebecca Mckenna MD

## 2023-02-10 RX ORDER — NORTRIPTYLINE HYDROCHLORIDE 50 MG/1
50 CAPSULE ORAL
Qty: 30 | Refills: 5 | Status: ACTIVE | COMMUNITY
Start: 2022-02-18 | End: 1900-01-01

## 2023-02-16 ENCOUNTER — PRE VISIT (OUTPATIENT)
Dept: MATERNAL FETAL MEDICINE | Facility: CLINIC | Age: 43
End: 2023-02-16

## 2023-02-16 DIAGNOSIS — O24.419 GESTATIONAL DIABETES MELLITUS (GDM) IN FIRST TRIMESTER, GESTATIONAL DIABETES METHOD OF CONTROL UNSPECIFIED: Primary | ICD-10-CM

## 2023-02-17 ENCOUNTER — TELEPHONE (OUTPATIENT)
Dept: MATERNAL FETAL MEDICINE | Facility: CLINIC | Age: 43
End: 2023-02-17

## 2023-02-17 NOTE — TELEPHONE ENCOUNTER
Diabetes Education Scheduling Outreach #1:    Call to patient to schedule. Left message with phone number to call to schedule.    Plan for 2nd outreach attempt within 1 business day.    Elizabeth Conroy OnCall  Diabetes and Nutrition Scheduling

## 2023-02-20 ENCOUNTER — TELEPHONE (OUTPATIENT)
Dept: ENDOCRINOLOGY | Facility: CLINIC | Age: 43
End: 2023-02-20

## 2023-02-20 ENCOUNTER — VIRTUAL VISIT (OUTPATIENT)
Dept: ENDOCRINOLOGY | Facility: CLINIC | Age: 43
End: 2023-02-20
Payer: COMMERCIAL

## 2023-02-20 ENCOUNTER — VIRTUAL VISIT (OUTPATIENT)
Dept: EDUCATION SERVICES | Facility: CLINIC | Age: 43
End: 2023-02-20
Payer: COMMERCIAL

## 2023-02-20 DIAGNOSIS — O24.419 GESTATIONAL DIABETES MELLITUS (GDM) IN FIRST TRIMESTER, GESTATIONAL DIABETES METHOD OF CONTROL UNSPECIFIED: ICD-10-CM

## 2023-02-20 PROCEDURE — G0108 DIAB MANAGE TRN  PER INDIV: HCPCS | Mod: 93 | Performed by: DIETITIAN, REGISTERED

## 2023-02-20 PROCEDURE — 99203 OFFICE O/P NEW LOW 30 MIN: CPT | Mod: VID | Performed by: PHYSICIAN ASSISTANT

## 2023-02-20 RX ORDER — BLOOD SUGAR DIAGNOSTIC
STRIP MISCELLANEOUS
Qty: 500 STRIP | Refills: 1 | Status: SHIPPED | OUTPATIENT
Start: 2023-02-20 | End: 2023-03-13

## 2023-02-20 RX ORDER — CHLORPHENIR/PHENYLEPH/ASPIRIN 2-7.8-325
1 TABLET, EFFERVESCENT ORAL DAILY
Qty: 50 STRIP | Refills: 3 | Status: SHIPPED | OUTPATIENT
Start: 2023-02-20 | End: 2024-01-22

## 2023-02-20 RX ORDER — BLOOD-GLUCOSE METER
EACH MISCELLANEOUS
Qty: 1 KIT | Refills: 0 | Status: SHIPPED | OUTPATIENT
Start: 2023-02-20 | End: 2024-01-22

## 2023-02-20 NOTE — PATIENT INSTRUCTIONS
Please  the OneTouch Verio glucometer and test strips from [employer]. You need to check your blood sugar every morning and 1 hr after each meal. Please write these numbers down in a glucose log. You will need to bring this log to your upcoming diabetic educator (CDE) appt and to our next visit.    Blood sugar goals are less than 95 mg/dl fasting and less than 140 mg/dl 1 hr after meals.    I would also like you to check your urine for ketones every morning until you see the diabetic educator.    If you have any questions, please contact me through [employer].

## 2023-02-20 NOTE — LETTER
2/20/2023         RE: Earnestine AMIN Wilmer  [address]  [facility] [address]        Dear Colleague,    Thank you for referring your patient, aErnestine Guillen, to the [facility]. Please see a copy of my visit note below.    Diabetes Self-Management Education & Support      SUBJECTIVE/OBJECTIVE:  Presents for education related to gestational diabetes.    Accompanied by: Self  Diabetes management related comments/concerns: Is it ok I have a milk shake for breakfast  Gestational weeks: 11w3d  Previously had Gestational Diabetes: Yes  Had Diabetes Education before: Yes  Previous insulin or other diabetes medication during that pregnancy: No  Have you ever had thyroid problems or taken thyroid medication?: No  Heart disease, mitral valve prolapse or rheumatic fever?: No  Hypertension : No  High Cholesterol: No  High Triglycerides: No  Do you use tobacco products?: No  Do you drink beer, wine or hard liquor?: No    Cultural Influences/Ethnic Background:  Choose not to answer    Estimated Date of Delivery: Sep 8, 2023, 11w3d    1 hour OGTT  Lab Results   Component Value Date    GLU1 144 (H) 01/20/2023         3 hour OGTT    Fasting  Lab Results   Component Value Date    GTTGF 101 (H) 01/23/2023       1 hour  Lab Results   Component Value Date    GTTG1 197 (H) 01/23/2023       2 hour  Lab Results   Component Value Date    GTTG2 203 (H) 01/23/2023       3 hour  Lab Results   Component Value Date    GTTG3 101 01/23/2023       Lifestyle and Health Behaviors:  Pre-pregnancy weight (lbs): 136  Exercise:: Unable to exercise (active job)  Barrier to exercise: Time  Cultural/Rastafari diet restrictions?: No  Meal planning/habits: Avoiding sweets  How many times a week on average do you eat food made away from home (restaurant/take-out)?: 1  Meals include: Breakfast, Lunch, Dinner, Morning Snack, Afternoon Snack  Breakfast: 2%milk with strawberries  Lunch: burrito (steak, beans)-2pm  Dinner: chicken thighs,  salad- 6pm  Snacks: 930- 2 eggs, tomato, onion, 12 grain bread, afternoon snack-banana and apple,  Beverages: Water, Milk, Juice (sometimes orange or apple juice at breakfast)  How many servings of fruits/vegetables per day: 4  Biggest challenges to healthy eating: Portion control  Pre- vitamin?: Yes (tablet)  Supplements?: No  Experiencing nausea?: Yes  Experiencing heartburn?: No    Restaurants- typically breakfast-keys(scrambled eggs, with sausage and white bread) or mexican food (3 tacos with corn tortilla)    Usually makes brown rice at home - has pre-diabetes    Concerned at night- has hard time sleeping    Healthy Coping:  Emotional response to diabetes: Ready to learn  Informal Support system:: Family  Stage of change: ACTION (Actively working towards change)    Current Management:  Taking medications for gestational diabetes?: No  Difficulty affording diabetes medication?: No  Difficulty affording diabetes testing supplies?: No    ASSESSMENT:  Patient reports not needing insulin in the past.  Current breakfast may cause high blood sugars, though she is in the pre growth hormone phase of the pregnancy and may be able to eat more liberally.  She works 10 hours of an active job and does not do any additional activity after.     INTERVENTION:  Patient was instructed on One Touch Verio meter.    Educational topics covered today:  GDM diagnosis, pathophysiology, Risks and Complications of GDM, Means of controlling GDM, Using a Blood Glucose Monitor, Blood Glucose Goals, Logging and Interpreting Glucose Results, Ketone Testing, When to Call a Diabetes Educator or OB Provider, Healthy Eating During Pregnancy, Counting Carbohydrates, Meal Planning for GDM, and Physical Activity    Educational materials provided today:   [facility] Understanding Gestational Diabetes  GDM Log Book  Sharps Disposal  Care After Delivery    Pt verbalized understanding of concepts discussed and recommendations provided today.      PLAN:  Check glucose 4 times daily, before breakfast and 1 hour after each meal.     Check Ketones daily for one week, if negative, reduce testing to once a week.     Physical activity recommended: resume if not working.    Meal plan: 30g carbs at breakfast, 45-60g carbs at lunch, 45-60g carbs at supper, 15-30 carbs at 3 snacks a day.  Follow consistent CHO meal plan, eat CHO and protein/fat at all meals/snacks.    Call/e-mail/[employer] message diabetes educator if 3 or more blood sugars are above the goal in 1 week, if ketones are positive, or with questions/concerns.    Yanci Shaw MS, RD, LD, CDE  Time Spent: 34 minutes  Encounter Type: Individual    Any diabetes medication dose changes were made via the CDE Protocol and Collaborative Practice Agreement with the patient's endocrinology provider and OB/GYN provider. A copy of this encounter was shared with the provider.

## 2023-02-20 NOTE — LETTER
2/20/2023         RE: Earnestine [address]  [facility] [address]        Dear Colleague,    Thank you for referring your patient, Earnestine Guillen, to the [facility]. Please see a copy of my visit note below.    Assessment/Plan :   1. Gestational diabetes, high risk pregnancy. We reviewed the importance of tight blood sugar control in pregnancy. We also had a brief discussion regarding carb counting and where to find information on carbohydrate content in food. She needs to get at minimum about 175 g of carbs daily. She should aim for about 30-45 grams with breakfast, 45-60 grams with lunch and dinner, and 15-30 grams with snacks. I would like her to keep a food diary for the next couple of weeks in preparation for her upcoming CDE appointment.    I will also send in a prescription for the OneTouch Verio glucometer along with test strips. She needs to check her blood sugars first thing in the morning and 1 hr after every meal. She needs to keep a glucose log and, again, make sure to bring the log to her next CDE appt. She can also send me the numbers through [employer]. I would also like her to check for urinary ketones ever morning for the next couple of weeks.    This information will help us make a decision regarding treatment, moving forward. If you have any questions, please contact my office. You need to follow-up with our CDE team in the next week or two. We will follow-up at my office in about 1 month.      Due to the COVID 19 pandemic this visit was a telephone/video visit in order to help prevent spread of infection in this patient and the general population. The patient gave verbal consent for the visit today. I have independently reviewed and interpreted labs, imaging as indicated.       Distant Location (provider location):  On-site  Mode of Communication:  Telephone Visit  Chart review/prep time 15    Visit Start time 0829  Visit Stop time 0840  30 minutes spent on the  date of the encounter doing chart review, history and exam, documentation and further activities as noted above.      Chief complaint:  Earnestine is a 42 year old female seen in consultation at the request of Dr. Christianson for gestational diabetes.    I have reviewed Care Everywhere including [facility], [facility], [facility], [facility], [facility] , [facility], [place] lab reports, imaging reports and provider notes as indicated.      HISTORY OF PRESENT ILLNESS  Earnestine is currently at about 11 weeks of pregnancy with EDC of . She is  with history of gestational diabetes in her previous pregnancy. She was able to manage her blood sugars though diet alone in her previous pregnancy. She is familial with glucose monitoring but she does not currently have a glucometer. She has also been working to limit sugary soft drinks and she has been trying to eat healthy since she was notified that her blood sugars were elevated. She has not had any problems with signs or symptoms of severe hyperglycemia and/or hypoglycemia.     She was also recently diagnosed with COVID. She was treated with Paxlovid and she reports that her symptoms have completely resolved. She is feeling good. She has had some trouble sleeping through the night but, otherwise, she has no complaints.     Endocrine relevant labs are as follows:   Latest Reference Range & Units 23 07:41   Gestational GTT Fasting 60 - 94 mg/dL 101 (H)   (H): Data is abnormally high   Latest Reference Range & Units 23 08:46   Gestational GTT 1 Hr Post Dose 60 - 179 mg/dL 197 (H)   (H): Data is abnormally high   Latest Reference Range & Units 23 09:47   Gestational GTT 2 Hr Post Dose 60 - 154 mg/dL 203 (H)   (H): Data is abnormally high   Latest Reference Range & Units 23 10:45   Gestational GTT 3 Hr Post Dose 60 - 139 mg/dL 101       REVIEW OF SYSTEMS  Answers for HPI/ROS submitted by the patient on 2023  General Symptoms:  No  Skin Symptoms: No  HENT Symptoms: No  EYE SYMPTOMS: No  HEART SYMPTOMS: No  LUNG SYMPTOMS: No  INTESTINAL SYMPTOMS: No  URINARY SYMPTOMS: No  GYNECOLOGIC SYMPTOMS: No  BREAST SYMPTOMS: No  SKELETAL SYMPTOMS: No  BLOOD SYMPTOMS: No  NERVOUS SYSTEM SYMPTOMS: No  MENTAL HEALTH SYMPTOMS: No    Endocrine: positive for negative and gestational diabetes  Skin: negative  Eyes: negative for, visual blurring  Ears/Nose/Throat: negative for, nasal congestion, sinus trouble  Respiratory: No shortness of breath, dyspnea on exertion, cough, or hemoptysis  Cardiovascular: negative for, chest pain and dyspnea on exertion  Gastrointestinal: negative for, nausea, vomiting, constipation and diarrhea  Genitourinary: negative for, nocturia, dysuria, frequency and urgency  Musculoskeletal: negative for, muscular weakness and nocturnal cramping  Neurologic: negative for and numbness or tingling of feet  Psychiatric: negative  Hematologic/Lymphatic/Immunologic: negative    Past Medical History  Past Medical History:   Diagnosis Date     Acute pyelonephritis      Anemia      Autoimmune hepatitis (H) 2018     Cervicalgia      Diabetes mellitus (H)     DGDM     Migraine      PID (acute pelvic inflammatory disease) 2018     Transaminitis        Medications  Current Outpatient Medications   Medication Sig Dispense Refill     azaTHIOprine (IMURAN) 50 MG tablet Take 50 mg by mouth daily       hydroxychloroquine (PLAQUENIL) 200 MG tablet TAKE 1 & 1/2 TABLETS (300 MG) BY MOUTH ONCE DAILY.       hydrOXYzine (VISTARIL) 50 MG capsule Take 1-2 capsules ( mg) by mouth 3 times daily as needed for itching (Can take up to 100mg at night) 30 capsule 0     prednisoLONE acetate (PRED FORTE) 1 % ophthalmic suspension INSTILL 1 DROP LEFT EYE SIX TIMES A DAY       Prenatal Vit-Fe Fumarate-FA (PRENATAL MULTIVITAMIN W/IRON) 27-0.8 MG tablet Take 1 tablet by mouth daily       SUMAtriptan (IMITREX) 100 MG tablet Take 1 tablet (100 mg) by mouth at onset of  headache for migraine 10 tablet 11       Allergies  Allergies   Allergen Reactions     Methocarbamol Rash     Could be an allergy with this or propranolol     Propranolol Rash     Could be an allergy with this or methocarbamol     Venlafaxine Rash       Family History  family history is not on file.    Social History  Social History     Tobacco Use     Smoking status: Never     Smokeless tobacco: Never       Physical Exam  There is no height or weight on file to calculate BMI.  GENERAL: no distress  RESP: No audible wheeze, cough, or visible cyanosis.  No visible retractions or increased work of breathing.    NEURO: Awake, alert, responds appropriately to questions.  Mentation and speech fluent.  PSYCH:affect normal.      DATA REVIEW  She is not currently monitoring blood sugars              Again, thank you for allowing me to participate in the care of your patient.        Sincerely,        Alissa Friedman PA-C

## 2023-02-20 NOTE — PATIENT INSTRUCTIONS
It was good to meet you today.  Here is the after visit summary and some bedtime snack ideas.    1. Check blood sugar 4 times a day, before breakfast and 1 hour after the start of each meal.     2. Check urine ketones when you wake up every morning for 7 days. If negative everyday, reduce testing to once a week.    Fasting/before breakfast goal: 95 or less  1 hour after meals: 140 or less  If you can't test at 1 hour do 2 hours after the meal: goal 120 or less    3. Follow the recommended meal plan: eat something every 2-3 hours, include protein/fat and carbohydrate at every meal and snack, have 30g carbs at breakfast, 45-60g carbs at lunch, 45-60g carbs at supper, 15-30g carbs at 3 snacks per day.     4. Add activity to every day, try walking or being active after each meal to help control blood sugar levels.    5.Call or e-mail educator if 3 or more blood sugars are above goal in 1 week. Call or e-mail with questions or concerns.      Yanci Shaw MS, RD, LD, CDE      [facility] for the [facility]:  For Your Diabetes Education or Nutrition Appointments Call:  [phone]  For Diabetes Education and Nutrition Related Questions:   Phone: [phone]  E-mail: [email or web address]  Fax: [phone]  If you need a medication refill please contact your pharmacy. Please allow 3 business days for your refills to be completed.     Here are some ideas for breakfast or bedtime snack. Pick a carbohydrate from the right and a protein from the left. If you have carbohydrates 30 grams of total carbohydrate and 3-5 grams of fiber that is even better.   Fruits are not listed as they can cause the blood sugar to raise blood sugar quickly and be used up early in the night. Fruits are better at meals, or morning or afternoon snack.     Protein/Fat list (about 14 grams of protein or 2 fat servings)   2 slices of cheese                                                                                                     2 TBS Peanut butter/[place] butter/Sun butter                                                    cup Cottage cheese 2% 2 pieces of toast  2 oz any cooked meat - less than deck of cards size   1.5 oz of soy nuts   Avocado or guacamole     cup tuna - can add mayonnaise   2 eggs - boiled, poached, fried, scrambled, omelet 30 chips  1 veggie karie (might have carbohydrates also)   2 TBS Coconut   12 shrimp - not breaded   2-1oz meatballs   2 Tbs cream cheese - plain, veggie, salmon - no fruit or honey.     cup of nuts   10 olives    Tofu or Temph 4-5oz     Carbohydrate list (about 30 grams of carbohydrate)  English Muffin  10 crackers  1 hamburger or hot dog bun  1 whole wheat mariana  6 bob cracker squares  1 cup full fat ice cream - no candy or sauce  Greek yogurt - 30 grams of carbohydrate  2 - 6 inch tortillas corn or flour  2 toaster waffles - no syrup  6 cups popcorn - unsweetened  1 cup of unsweetened lentils or beans  1 cup potato salad    You can always add vegetables with dip, salad dressing or salsa also.

## 2023-02-20 NOTE — PROGRESS NOTES
Assessment/Plan :   1. Gestational diabetes, high risk pregnancy. We reviewed the importance of tight blood sugar control in pregnancy. We also had a brief discussion regarding carb counting and where to find information on carbohydrate content in food. She needs to get at minimum about 175 g of carbs daily. She should aim for about 30-45 grams with breakfast, 45-60 grams with lunch and dinner, and 15-30 grams with snacks. I would like her to keep a food diary for the next couple of weeks in preparation for her upcoming CDE appointment.    I will also send in a prescription for the OneTouch Verio glucometer along with test strips. She needs to check her blood sugars first thing in the morning and 1 hr after every meal. She needs to keep a glucose log and, again, make sure to bring the log to her next CDE appt. She can also send me the numbers through [employer]. I would also like her to check for urinary ketones ever morning for the next couple of weeks.    This information will help us make a decision regarding treatment, moving forward. If you have any questions, please contact my office. You need to follow-up with our CDE team in the next week or two. We will follow-up at my office in about 1 month.      Due to the COVID 19 pandemic this visit was a telephone/video visit in order to help prevent spread of infection in this patient and the general population. The patient gave verbal consent for the visit today. I have independently reviewed and interpreted labs, imaging as indicated.       Distant Location (provider location):  On-site  Mode of Communication:  Telephone Visit  Chart review/prep time 15    Visit Start time 0829  Visit Stop time 0840  30 minutes spent on the date of the encounter doing chart review, history and exam, documentation and further activities as noted above.      Chief complaint:  Earnestine is a 42 year old female seen in consultation at the request of Dr. Christianson for gestational diabetes.    I  have reviewed Care Everywhere including [facility], [facility], [facility], [facility], [facility], [facility] , [facility], [facility] lab reports, imaging reports and provider notes as indicated.      HISTORY OF PRESENT ILLNESS  Earnestine is currently at about 11 weeks of pregnancy with EDC of . She is  with history of gestational diabetes in her previous pregnancy. She was able to manage her blood sugars though diet alone in her previous pregnancy. She is familial with glucose monitoring but she does not currently have a glucometer. She has also been working to limit sugary soft drinks and she has been trying to eat healthy since she was notified that her blood sugars were elevated. She has not had any problems with signs or symptoms of severe hyperglycemia and/or hypoglycemia.     She was also recently diagnosed with COVID. She was treated with Paxlovid and she reports that her symptoms have completely resolved. She is feeling good. She has had some trouble sleeping through the night but, otherwise, she has no complaints.     Endocrine relevant labs are as follows:   Latest Reference Range & Units 23 07:41   Gestational GTT Fasting 60 - 94 mg/dL 101 (H)   (H): Data is abnormally high   Latest Reference Range & Units 23 08:46   Gestational GTT 1 Hr Post Dose 60 - 179 mg/dL 197 (H)   (H): Data is abnormally high   Latest Reference Range & Units 23 09:47   Gestational GTT 2 Hr Post Dose 60 - 154 mg/dL 203 (H)   (H): Data is abnormally high   Latest Reference Range & Units 23 10:45   Gestational GTT 3 Hr Post Dose 60 - 139 mg/dL 101       REVIEW OF SYSTEMS  Answers for HPI/ROS submitted by the patient on 2023  General Symptoms: No  Skin Symptoms: No  HENT Symptoms: No  EYE SYMPTOMS: No  HEART SYMPTOMS: No  LUNG SYMPTOMS: No  INTESTINAL SYMPTOMS: No  URINARY SYMPTOMS: No  GYNECOLOGIC SYMPTOMS: No  BREAST SYMPTOMS: No  SKELETAL SYMPTOMS: No  BLOOD SYMPTOMS: No  NERVOUS  SYSTEM SYMPTOMS: No  MENTAL HEALTH SYMPTOMS: No    Endocrine: positive for negative and gestational diabetes  Skin: negative  Eyes: negative for, visual blurring  Ears/Nose/Throat: negative for, nasal congestion, sinus trouble  Respiratory: No shortness of breath, dyspnea on exertion, cough, or hemoptysis  Cardiovascular: negative for, chest pain and dyspnea on exertion  Gastrointestinal: negative for, nausea, vomiting, constipation and diarrhea  Genitourinary: negative for, nocturia, dysuria, frequency and urgency  Musculoskeletal: negative for, muscular weakness and nocturnal cramping  Neurologic: negative for and numbness or tingling of feet  Psychiatric: negative  Hematologic/Lymphatic/Immunologic: negative    Past Medical History  Past Medical History:   Diagnosis Date     Acute pyelonephritis      Anemia      Autoimmune hepatitis (H) 2018     Cervicalgia      Diabetes mellitus (H)     DGDM     Migraine      PID (acute pelvic inflammatory disease) 2018     Transaminitis        Medications  Current Outpatient Medications   Medication Sig Dispense Refill     azaTHIOprine (IMURAN) 50 MG tablet Take 50 mg by mouth daily       hydroxychloroquine (PLAQUENIL) 200 MG tablet TAKE 1 & 1/2 TABLETS (300 MG) BY MOUTH ONCE DAILY.       hydrOXYzine (VISTARIL) 50 MG capsule Take 1-2 capsules ( mg) by mouth 3 times daily as needed for itching (Can take up to 100mg at night) 30 capsule 0     prednisoLONE acetate (PRED FORTE) 1 % ophthalmic suspension INSTILL 1 DROP LEFT EYE SIX TIMES A DAY       Prenatal Vit-Fe Fumarate-FA (PRENATAL MULTIVITAMIN W/IRON) 27-0.8 MG tablet Take 1 tablet by mouth daily       SUMAtriptan (IMITREX) 100 MG tablet Take 1 tablet (100 mg) by mouth at onset of headache for migraine 10 tablet 11       Allergies  Allergies   Allergen Reactions     Methocarbamol Rash     Could be an allergy with this or propranolol     Propranolol Rash     Could be an allergy with this or methocarbamol     Venlafaxine  Rash       Family History  family history is not on file.    Social History  Social History     Tobacco Use     Smoking status: Never     Smokeless tobacco: Never       Physical Exam  There is no height or weight on file to calculate BMI.  GENERAL: no distress  RESP: No audible wheeze, cough, or visible cyanosis.  No visible retractions or increased work of breathing.    NEURO: Awake, alert, responds appropriately to questions.  Mentation and speech fluent.  PSYCH:affect normal.      DATA REVIEW  She is not currently monitoring blood sugars

## 2023-02-20 NOTE — TELEPHONE ENCOUNTER
PA Initiation    Medication: Ketone Strips - PA Pending  Insurance Company: [employer] [phone] Fax [phone]  Pharmacy Filling the Rx:    Filling Pharmacy Phone:    Filling Pharmacy Fax:    Start Date: 2/20/2023

## 2023-02-20 NOTE — PROGRESS NOTES
Diabetes Self-Management Education & Support      SUBJECTIVE/OBJECTIVE:  Presents for education related to gestational diabetes.    Accompanied by: Self  Diabetes management related comments/concerns: Is it ok I have a milk shake for breakfast  Gestational weeks: 11w3d  Previously had Gestational Diabetes: Yes  Had Diabetes Education before: Yes  Previous insulin or other diabetes medication during that pregnancy: No  Have you ever had thyroid problems or taken thyroid medication?: No  Heart disease, mitral valve prolapse or rheumatic fever?: No  Hypertension : No  High Cholesterol: No  High Triglycerides: No  Do you use tobacco products?: No  Do you drink beer, wine or hard liquor?: No    Cultural Influences/Ethnic Background:  Choose not to answer    Estimated Date of Delivery: Sep 8, 2023, 11w3d    1 hour OGTT  Lab Results   Component Value Date    GLU1 144 (H) 01/20/2023         3 hour OGTT    Fasting  Lab Results   Component Value Date    GTTGF 101 (H) 01/23/2023       1 hour  Lab Results   Component Value Date    GTTG1 197 (H) 01/23/2023       2 hour  Lab Results   Component Value Date    GTTG2 203 (H) 01/23/2023       3 hour  Lab Results   Component Value Date    GTTG3 101 01/23/2023       Lifestyle and Health Behaviors:  Pre-pregnancy weight (lbs): 136  Exercise:: Unable to exercise (active job)  Barrier to exercise: Time  Cultural/Hindu diet restrictions?: No  Meal planning/habits: Avoiding sweets  How many times a week on average do you eat food made away from home (restaurant/take-out)?: 1  Meals include: Breakfast, Lunch, Dinner, Morning Snack, Afternoon Snack  Breakfast: 2%milk with strawberries  Lunch: burrito (steak, beans)-2pm  Dinner: chicken thighs, salad- 6pm  Snacks: 930- 2 eggs, tomato, onion, 12 grain bread, afternoon snack-banana and apple,  Beverages: Water, Milk, Juice (sometimes orange or apple juice at breakfast)  How many servings of fruits/vegetables per day: 4  Biggest challenges to  healthy eating: Portion control  Pre- vitamin?: Yes (tablet)  Supplements?: No  Experiencing nausea?: Yes  Experiencing heartburn?: No    Restaurants- typically breakfast-keys(scrambled eggs, with sausage and white bread) or mexican food (3 tacos with corn tortilla)    Usually makes brown rice at home - has pre-diabetes    Concerned at night- has hard time sleeping    Healthy Coping:  Emotional response to diabetes: Ready to learn  Informal Support system:: Family  Stage of change: ACTION (Actively working towards change)    Current Management:  Taking medications for gestational diabetes?: No  Difficulty affording diabetes medication?: No  Difficulty affording diabetes testing supplies?: No    ASSESSMENT:  Patient reports not needing insulin in the past.  Current breakfast may cause high blood sugars, though she is in the pre growth hormone phase of the pregnancy and may be able to eat more liberally.  She works 10 hours of an active job and does not do any additional activity after.     INTERVENTION:  Patient was instructed on One Touch Verio meter.    Educational topics covered today:  GDM diagnosis, pathophysiology, Risks and Complications of GDM, Means of controlling GDM, Using a Blood Glucose Monitor, Blood Glucose Goals, Logging and Interpreting Glucose Results, Ketone Testing, When to Call a Diabetes Educator or OB Provider, Healthy Eating During Pregnancy, Counting Carbohydrates, Meal Planning for GDM, and Physical Activity    Educational materials provided today:   [facility] Understanding Gestational Diabetes  GDM Log Book  [facility]ar[facility] Disposal  Care After Delivery    Pt verbalized understanding of concepts discussed and recommendations provided today.     PLAN:  Check glucose 4 times daily, before breakfast and 1 hour after each meal.     Check Ketones daily for one week, if negative, reduce testing to once a week.     Physical activity recommended: resume if not working.    Meal plan: 30g carbs at  breakfast, 45-60g carbs at lunch, 45-60g carbs at supper, 15-30 carbs at 3 snacks a day.  Follow consistent CHO meal plan, eat CHO and protein/fat at all meals/snacks.    Call/e-mail/MyChart message diabetes educator if 3 or more blood sugars are above the goal in 1 week, if ketones are positive, or with questions/concerns.    Yanci Shaw MS, RD, LD, CDE  Time Spent: 34 minutes  Encounter Type: Individual    Any diabetes medication dose changes were made via the CDE Protocol and Collaborative Practice Agreement with the patient's endocrinology provider and OB/GYN provider. A copy of this encounter was shared with the provider.

## 2023-02-20 NOTE — PROGRESS NOTES
Maternal-Fetal Medicine Consultation    Earnestine Guillen  : 1980  MRN: [id number]    REFERRAL:  Earnestine Guillen is a 42 year old sent by Dr. Mckenna from [facility] for MFM consultation.    HPI:  Earnestine Guillen is a 42 year old  at 11w4d by LMP consistent with 7w1d US here for MFM consultation for history of  birth, Sjogren's syndrome, +SSA/SSB antibodies, suspected pre-gestational diabetes, autoimmune hepatitis, migraines, and AMA.  Also with history of hepatitis B non immune and recurrent pregnancy loss (negative APAS labs 10/2018) that was not addressed in our consultation today.  She was seen by our office previously in 2022 and in 2019.  Please see prior documentation for details of prior counseling.    She is here alone.    Patient with history of 2 full term vaginal deliveries and 1  vaginal delivery at 34w3d (20).  Per review of medical records with  delivery she presented to labor and delivery with PPROM ~30 hours prior to delivery.  She ultimately underwent an uncomplicated .  Following this she had 3 (2021, 2022, and 2022) early first trimester miscarriages. Partner is 50.  Negative APAS labs in 2018 however have not been updated more recently after three consecutive miscarriages (lupus anticoagulant negative 2022 however anticardiolipin and beta 2 glycoprotein not done at that time per our record review).    Regarding her Sjogren's syndrome and +SSA/SSB antibodies, she follows closely with rheumatology, Dr. Neil, last visit 2022.  She is currently on Hydroxychloroquine 300 mg daily.  Symptoms stable; she reports last flare very remotely.  She plans to continue taking this in this pregnancy.    Earnestine was diagnosed with autoimmune hepatitis (AI) in ~ based on biopsy. She had presented with right flank pain and had a kidney infection and was diagnosed with AI hepatitis at that time. She was started on  azathioprine and tolerates the medication without issue, 50 mg daily.  LFTs at time of diagnosis in 400s but have normalized on new OB labs.  Follows with BRANDT Trujillo.    Regarding suspected pre-gestational diabetes, she was diagnosed with GDMA1 in multiple prior pregnancies however had an abnormal 1 hr GCT of 144 and abnormal early 3 hr GTT this pregnancy (101/197/203/101) consistent with likely underlying diagnosis of pre-gestational diabetes.  Follows with endocrinology Alissa Friedman PA-C, last visit 23.  Last A1C 5.7 1/3/23.  Reports eye exam within the last year.  Current regimen diet controlled.  Denies nephropathy, neuropathy, retinopathy.  Has diabetes education visit upcoming 23.     She has a history of migraines but these have been relatively mild and not worsening in pregnancy per her report.  Has seen neurology remotely and was previously on sumatriptan and received occipital injections for treatment.  Reports headaches ~2 weeks that last several days but are controlled with Tylenol.    She also had COVID early in this pregnancy requiring Paxlovid treatment however has recovered from this.  Denies ongoing symptoms.    Denies vaginal bleeding, LOF, abdominal pain.  Otherwise feeling well this pregnancy.    Prenatal Care:  Primary OB care this pregnancy has been with Dr. Mckenna  from [facility].    Obstetrics History:  OB History    Para Term  AB Living   7 3 2 1 3 3   SAB IAB Ectopic Multiple Live Births   3 0 0 0 3      # Outcome Date GA Lbr Deon/2nd Weight Sex Delivery Anes PTL Lv   7 Current            6 SAB 2022           5 SAB 2022           4 SAB 2021           3  20 34w3d 03:47 / 00:03 2.296 kg (5 lb 1 oz) M Vag-Spont Nitrous Y JENN      Name: ALOK SOLIS-CONSUELO      Apgar1: 8  Apgar5: 9   2 Term 03 40w0d  3.175 kg (7 lb) M Vag-Spont  N JENN   1 Term 00 38w0d  2.722 kg (6 lb) F Vag-Spont  N JENN      Obstetric Comments    Miscarriages- all within 1st trimester:    Nov 2021   Feb 2022   May 2022       Gynecologic History:  - Menstrual history:  Patient's last menstrual period was 12/02/2022 (exact date).   - Last Pap: ASCUS HPV positive 08/2022 07/17/17  NILM, neg HPV   07/23/18 ASCUS, Positive for High Risk HPV types other than 16 or 18     08/20/18 [place] visually normal Plan PAP/HPV in 1 year. Crystal Wright MD  04/13/21 NIL Pap, Neg HR HPV   All above in care everywhere   08/1/22 ASCUS Pap, + HR HPV (not 16 or 18) Plan cotest in 1 year due 08/1/23? Provider review    - History of pyelonephritis and urinary tract infection. Denies history of STI    Past Medical History:  Past Medical History:   Diagnosis Date     Acute pyelonephritis      Anemia      Autoimmune hepatitis (H) 01/01/2018     Diabetes mellitus (H)     DGDM     Migraine      PID (acute pelvic inflammatory disease) 01/01/2018     Sjogren's syndrome (H)      SS-A antibody positive      SS-B antibody positive      Past Surgical History:  Past Surgical History:   Procedure Laterality Date     INJECTION ANESTHETIC AGENT TO CERVICAL PLEXUS       Allergies:     Allergies   Allergen Reactions     Methocarbamol Rash     Could be an allergy with this or propranolol     Propranolol Rash     Could be an allergy with this or methocarbamol     Venlafaxine Rash      Social History:   Social History     Tobacco Use     Smoking status: Never     Smokeless tobacco: Never       Current Medications:  Current Outpatient Medications   Medication Instructions     Acetone, Urine, Test (KETONE TEST) STRP 1 each, In Vitro, DAILY     azaTHIOprine (IMURAN) 50 mg, Oral, DAILY     blood glucose (ONETOUCH ULTRA) test strip Use to test blood sugar 4-6 times daily or as directed.     blood glucose monitoring (ONETOUCH VERIO) meter device kit Use to test blood sugar 4-6 times daily or as directed.     hydroxychloroquine (PLAQUENIL) 200 MG tablet TAKE 1 & 1/2 TABLETS (300 MG) BY MOUTH ONCE DAILY.      prednisoLONE acetate (PRED FORTE) 1 % ophthalmic suspension INSTILL 1 DROP LEFT EYE SIX TIMES A DAY     Prenatal Vit-Fe Fumarate-FA (PRENATAL MULTIVITAMIN W/IRON) 27-0.8 MG tablet 1 tablet, Oral, DAILY     SUMAtriptan (IMITREX) 100 mg, Oral, AT ONSET OF HEADACHE     Family History:  Denies history of genetic disorders, preeclampsia, thromboembolic disease, bleeding disorders, developmental delay.    ROS:  10-point ROS negative except as in HPI     PHYSICAL EXAM:  Deferred     Ultrasound:  DATING  ---------------------------------------------------------------------------------------------------------                                           Date                                Details                                                                                      Gest. age                      KRISHNA  LMP                                  12/2/2022                                                                                                                         11 w + 4 d                     9/8/2023  Prior assessment               1/20/2023                         GA: 7 w + 1 d                                                                            11 w + 5 d                     9/7/2023  U/S                                   2/21/2023                         based upon CRL                                                                        11 w + 5 d                      9/7/2023  Assigned dating                  Dating performed on 02/21/2023, based on the LMP                                                            11 w + 4 d                      9/8/2023        GENERAL EVALUATION  ---------------------------------------------------------------------------------------------------------  Cardiac activity present.  Placenta posterior.  Cord vessels normal insertion.  Amniotic fluid normal amount.        FETAL  BIOMETRY  ---------------------------------------------------------------------------------------------------------  FHR                                        170                     bpm  CRL                                         50.7                   mm                          11w 5d        Hadlock  NT                                           1.30                    mm        FETAL ANATOMY  ---------------------------------------------------------------------------------------------------------  Face: Nasal bone present  Neck: Normal Nuchal Translucency     The following structures appear normal:  Cranium. Abdominal wall. Stomach. Bladder. Arms. Legs.        MATERNAL STRUCTURES  ---------------------------------------------------------------------------------------------------------  Cervix                                  Visualized                                             Appearance: Appears Closed  Right Ovary                          Not visualized  Left Ovary                            Visualized        RECOMMENDATION  ---------------------------------------------------------------------------------------------------------  We discussed the findings on today's ultrasound with the patient.     Earnestine was seen for an outpatient consultation in conjunction with the ultrasound today. Please see the EPIC chart for further details.     Your patient had cell-free DNA screening drawn today. The results of the screen will be forwarded to you as soon as they become available. Because cell-free DNA screening does not screen for open neural tube defects, the patient should be offered MSAFP screening at 15 to 20 weeks gestation.     Serial cervical length surveillance (every 2 weeks) is recommended from 16-24 weeks gestation, every two week assessment of the mechanical GA interval from 16-26 weeks gestation (alternating with MFM and Pediatric Cardiology), comprehensive ultrasound at 20 weeks gestation, followed by  every 4 week assessments of fetal growth. These appointments have been scheduled through our office today.     Return to primary provider for continued prenatal care.     Thank-you for the opportunity to participate in the care of this patient. If you have questions regarding today's evaluation or if we can be of further service, please contact the  [facility].     **Fetal anomalies may be present but not detected**                                                                   IMPRESSION  ---------------------------------------------------------------------------------------------------------  1) Herring intrauterine pregnancy at 11w 4d gestational age.  2) The nuchal translucency measurement is within the normal range.  3) The nasal bone was visualized.    Prenatal Labs:    1/3/23  A1C 5.7  Urine protein/creatinine 0.12  Creatinine 0.60  AST 25  ALT 26  TSH 2.31    6/25/19  ABO/Rh: O+  ABS: negative         Hgb: 13.4  Plt: 147  TPA: NR  HepBSAg: negative, HepBSAby negative  HIV: negative  Rubella: immune    GC/Chlam: negative  Urine Cx: negative   Bile acids 73 (8/19/2019)  AST 55>66>594 (8/5/2019)>448 (8/19/19)  ALT 64>67>630 (8/5/2019)>456 (8/19/19)   Alk Phos 66>84>173 (8/5/2019) 164 (8/19/19)     Rheumatologic labs  EMELIA 1:2560  EMELIA Profile:  RANA Positive (A) 10/15/2018 09:32 AM   RSM <20 10/15/2018 09:32 AM   RRNP <20 10/15/2018 09:32 AM    (A) 10/15/2018 09:32 AM    (A) 10/15/2018 09:32 AM   RSCL <20 10/15/2018 09:32 AM   RCEN < 1:40 10/15/2018 09:32 AM   RDNA 2.7 10/15/2018 09:32 AM   RC3 156 10/15/2018 09:32 AM   RC4 17 (L) 10/15/2018 09:32 AM     Pre-DMARD Labs:  Lab Results   Component Value Date/Time   HBSAG Negative (Non Reactive) 06/25/2019 08:27 AM   HBABR 3.8 06/25/2019 08:27 AM   ANTIHCV Negative (Non Reactive) 08/09/2018 01:58 PM   HIV Negative (Non Reactive) 06/25/2019 08:27 AM     IgG 3548    No rheumatoid factor    TPMT Normal  Mitochondrial  positive  F-Actin positive    2022  LFTs normal  UA bland with no protein or RBCs    Review of Pertinent Imaging:  CXR:  FINDINGS: Cardiac and mediastinal silhouettes are unremarkable. Lungs are clear.    Abdominal US:  1. Sonographically normal-appearing liver.   2. Tiny gallbladder polyps with the largest measuring 4 mm in greatest dimension. These are likely benign.     Pertinent pathology:  Liver Biopsy:  Final Pathologic Diagnosis   Liver, random, needle core biopsy:       -Mild to moderate (grade 1-2) periportal and lobular activity       -Stage III bridging fibrosis       -Macrovesicular steatosis (10-20%)       -See comment   Comments   Although LFTs are somewhat atypical, histologic findings support the diagnosis of autoimmune hepatitis once drugs and toxins have been excluded.    ASSESSMENT/PLAN:  Earnestine Guillen is a 42 year old  at 11w4d by LMP consistent with 7w1d US here for MFM consultation for history of  birth, Sjogren's syndrome, +SSA/SSB antibiotics, suspected pre-gestational diabetes, autoimmune hepatitis, migraines, and AMA.  Also with history of hepatitis B non immune and recurrent pregnancy loss (negative APAS labs 10/2018) that was not addressed in our consultation today.  She was seen by our office previously in 2022 and in 2019.  Please see prior documentation for details of prior counseling.    History of  birth  Today we discussed the incidence and epidemiology of  birth (PTB).  There are multiple etiologies of spontaneous PTB, including but not limited to infection, bleeding, uterine distension, cervical insufficiency and stress.   However, most spontaneous  deliveries occur in patients with no risk factors.  A history of prior  delivery is a significant risk factor for recurrence in a subsequent pregnancy.  Recurrent  delivery has been linked to maternal ethnicity, genitourinary infection, and especially gestational age at the  first  delivery: the earlier the delivery, the greater the likelihood of recurrence.  We discussed that recurrent  birth can happen at the same gestational age as a prior  birth but that it cannot be predicted and it could recur at an earlier gestational age.  We also discussed the concept of cervical insufficiency which is another cause of  birth and is classically described as painless cervical dilation.  Most women with cervical insufficiency which is either undiagnosed or those undergoing expectant management will eventually develop symptoms, which may be pressure, back pain, cramping, leakage of fluid, vaginal bleeding and ultimately contractions.  Mid-trimester cervical dilation increases the risk of intra-amniotic infection which can then cause contractions and labor.      We discussed the increased  morbidity and mortality with prematurity which is the rationale for trying to prevent recurrent  birth.  We follow cervical lengths every two from 16 to 23 weeks in women with a history of PTB; an ultrasound indicated cerclage may be considered if shortening is detected prior to 23-24 weeks, given that all cases of cervical insufficiency may not fit into the classic patterns by history.    We did not recommend 17 hydroxyprogesterone caproate (17-OHP) given the available data and her prior  birth occurred in the late  period.    Recommendations:  ? Baseline transvaginal cervical length at 16 weeks gestation with measurements every 2 weeks through 23-24 weeks.     ? Ultrasound-indicated cerclage should be considered if shortening <25 mm is diagnosed <24 weeks.  ? Administration of  corticosteroids if the patient is deemed to be at increased risk of imminent  delivery.  ? Urine culture every trimester with aggressive treatment of bacteriuria.  ? Clinical evaluation of  labor symptoms.      Sjogren's syndrome  +SSA/SSB  Sjogren disease  may occur alone or in a secondary form associated with other disorders (such as rheumatoid arthritis).  While it is characterized by exocrine gland dysfunction, it may also involves many other organ systems.  Women with primary Sjogren s disease tend to do very well during pregnancy. We reviewed these risks in our consult today.      Women with SS-A (Ro) or SS-B (La) antibodies are at risk for  lupus, characterized by rash and congenital heart block (CHB). The risk of developing heart block in offspring of women with anti-Ro/SSA antibodies in the absence of a prior birth with  lupus syndrome is approximately 1-2%.   It increases to 15-20% if a previous child was affected.  Prenatal screening programs using the mechanical MD interval have been created to identify heart block early so that it may be treated before it becomes irreversible.  We discussed the recommendation for serial assessment of the mechanical MD interval to screen for CHB. We also recommend a formal fetal echocardiogram.    Recommendations:    Every other week assessment for fetal heart block  - 16, 20 and 24 weeks with MFM  - 18, 22 and 26 weeks with pediatric cardiology     EKG    Baseline creatinine, liver function tests, platelets and urine P/Cr ratio have been completed and reviewed    Fetal echocardiogram at around 22 weeks    Notification of pediatrics at delivery      Pregestational diabetes mellitus  Today we reviewed that pregnancy is characterized by insulin resistance.  Placental hormones increase in the second trimester and insulin resistance is greatest in the third trimester.  This often manifests as worsening glycemic control.  The overall goal of management of diabetes in pregnancy is maintenance of euglycemia which minimizes maternal and fetal risks.      We discussed that women with diabetes have significantly increased incidence of adverse pregnancy outcomes.  The frequency of fetal/, obstetric and  maternal outcomes are increased.  Fetal/ complications in pregnant women with diabetes include spontaneous miscarriage, stillbirth, congenital malformations, fetal growth abnormalities (often macrosomia, but can have fetal growth restriction with long-standing diabetes and associated vascular disease),  respiratory distress, hypoglycemia, and hyperbilirubinemia.  Obstetric complications in women with diabetes include increased incidence of preeclampsia,  term delivery and polyhydramnios.  Associated with macrosomia is an increase in significant maternal lacerations and  delivery as well as wound complications.  Polyhydramnios increases the risk of postpartum hemorrhage, as does macrosomia.  Maternal considerations include increased difficulty in maintaining euglycemia, increased risk of DKA and more frequent hospitalization..      Women who are in poor glycemic control have an increased frequency of complications.  The incidence of these outcomes is directly related to the hemoglobin A1C (HbA1C) level.  Poor glycemic control during the period of fetal organogenesis increases the incidence of miscarriage and congenital anomalies, and ongoing poor control increases the risk of stillbirth. Cardiac defects are most commonly seen as well as renal, central nervous system, spinal and gastrointestinal anomalies.      We discussed the importance of good glycemic control throughout the pregnancy to prevent the above listed complications.  In addition, concurrent maternal medical disorders are associated with long-standing diabetes mellitus, especially: thyroid dysfunction, retinopathy, nephropathy, and vascular disease.  To better understand the risks of undergoing pregnancy, a baseline assessment of each of these organs is recommended.     Recommendations:    Continued consultation with a diabetes educator    Continuation of a wholesome diet; often recommended is:   o 40-50% complex, high fiber  carbohydrate  o 10-30% protein  o 20-35% unsaturated fats    Glucose testing with fasting and one hour postprandial throughout the pregnancy  o With goals of fasting levels below 95 mg/dL and postprandial levels below 140 mg/dL  o Two-hour postprandial levels can be checked instead of one hour, but these are often harder to remember to do.  That goal is below 120 mg/dL.      Women who are difficult to control may also need pre-prandial, bedtime and/or a 2-3 am glucose check; this is most easily assessed via a continuous glucose sensor    Women should have home urine strips to assess for ketonuria when their serum glucose is > 200 mg/dL.  They should be instructed to immediately report any ketonuria because of the increased risk for DKA    Create an action plan for hypoglycemia, which may include glucose tablets, fruit juice or milk, and glucagon.    Insulin is the recommended therapy for women with diabetes who require pharmacotherapy    Low dose aspirin for preeclampsia prophylaxis     Baseline laboratories  o Hemoglobin A1C, and every trimester thereafter  o Thyroid stimulating hormone (TSH) completed  o Urine Pro/Cr ratio completed  o Baseline liver function tests, platelet count, and serum creatinine completed  o Baseline EKG  o Ophthalmologic examination  o Urine cultures every trimester and aggressive treatment of bacteriuria      surveillance    MSAFP at 15-20 weeks to screen for open neural tube defects    Comprehensive ultrasound at 18-20 weeks    Fetal echocardiogram as per above    Serial ultrasounds for fetal growth monthly starting at 24 weeks gestation given co-morbidities present     fetal testing with twice weekly BPP at 32 weeks; if the patient is poorly controlled weekly testing should be initiated at 28 weeks and increased to twice weekly at 32 weeks     Delivery    If well-controlled, delivery at 39 weeks is recommended    For women with poorly controlled diabetes, a delivery  before 39 weeks may be indicated, and we can help make recommendations as the pregnancy continues    Maternal glucose should be maintained at  mg/dL during delivery; this can be done with an insulin drip and requires hourly finger stick assessment in active labor     Postpartum    Breastfeeding should be encouraged in women with DM given significant short-term and long-term benefits to both infant and mother    Usually insulin requirements immediately decrease to about one third to one half of the predelivery regimen    Future healthcare maintenance    Continue follow-up with her primary care provider    Pre-conceptional consultation prior to any subsequent pregnancies    If appropriate, diet and weight loss should be encouraged, consider referral to a nutritionist to encourage lifelong healthy eating habits    Preconception counseling prior to any future pregnancies    Continue folic acid at least 400 mcg     Autoimmune hepatitis:  Women with autoimmune hepatitis are at risk for  delivery and flare of autoimmune hepatitis with worsening of disease. She has been stable on current dose of Azathioprine without flare.  Follows with BRANDT Trujillo.     Recommendations:    Continue daily Azathioprine    At least q trimester liver studies, any more frequency if any symptoms develop    Close monitoring for symptoms of cholestasis      AMA:  The risk of fetal aneuploidy increases with age. Genetic counseling reviewed these risks at length and discussed the options available for risk assessment for aneuploidy during pregnancy.  They also discussed how those strategies fit in with the available diagnostic tests (like CVS and amniocentesis).    Patients of advanced age are also at increased risks of pregnancy complications, including miscarriage, preeclampsia, abnormalities with placentation, stillbirth, and  delivery.  These risks increase with increasing maternal age and co morbidities.      Recommendations:    NIPT today per patient discussion with genetic counseling    Targeted anatomical ultrasound at 20 weeks.    Low-dose aspirin (usually 81 mg daily) recommended to begin now.      Migraines:  Migraines are more common in women than men and peak incidence is during childbearing years.  Approximately 17% of women suffer from migraines.  Approximately 2% of women will have their first migraine during pregnancy (most often the 1st trimester).  Migraine headache is often unilateral with a throbbing or pulsing quality.  Patients may have associated symptoms such as nausea, vomiting, photophobia and/or photophobia.      We discussed with her that in pregnancy migraine headaches often improve (about 60-70%), with about 5% of patients experiencing worsening symptoms and the remainder reporting no change.   It is thought that changing estrogen levels may modulate the change in occurrence during pregnancy.  Improvement is more likely in women who have menstrual migraine and those with aura.  Women who see improvement in the first trimester should expect continued improvement.  Often women will experience recurrence post-partum.  Some evidence suggests that breastfeeding women have fewer postpartum recurrences.      Migraine itself is not thought to impact pregnancy outcome such as congenital anomalies.  There may be an increased risk of preeclampsia/gestational hypertension.   Migraine (especially with aura) is though to increase the risk of stroke (Aylin et al, 2009), but it is unclear whether pregnancy further increases this risk and the absolute risk is low.    The mainstays of therapy include supportive care and acetaminophen use.  NSAIDS should be avoided due to potential premature closure of the ductus arteriosus.  Metoclopramide can be used and is often effective in treating the nausea that can be associated with migraine as well.  Opioids can be used with caution as they have a habit-forming  quality and can worsen the clinical situation with the formation of rebound headaches.      Recommendations:    Continued care with neurology as needed    We would be happy to discuss specific medications as the need arises    History of recurrent pregnancy loss  This history was not discussed in our consultation today however we recommend updated APAS labs as these have not been done since 2018 and she has since had three consecutive miscarriages.  Lupus anticoagulant, anti-beta 2 glycoprotein and anti-cardiolipin antibodies were drawn today.    The patient was seen and evaluated with Dr. Christianson    Thank you for allowing us to participate in the care of your patient. Please do not hesitate to contact us if you have further questions regarding the management of your patient.     Suri Smalls MD   Maternal-Fetal Medicine Fellow, PGY6  2/21/2023 10:59 AM     Physician Attestation   I saw this patient with the fellow and agree with the resident/fellow's findings and plan of care as documented in the note. Any necessary edits have been made by me.    MANAGEMENT DISCUSSED with the following over the past 24 hours: counseling regarding pregnancy complicated by type 2 diabetes mellitus, auto-immune hepatitis, SSA/SSB antibody positivity, advanced maternal age and evaluation for antiphospholipid antibody syndrome   NOTE(S)/MEDICAL RECORDS REVIEWED over the past 24 hours: OB, Endocrinology, GI and Rheumatology records  47 MINUTES SPENT BY ME on the date of service doing chart review, history, exam, documentation & further activities per the note.    Elizabeth Christianson MD  Date of Service (when I saw the patient): 2/21/23

## 2023-02-21 ENCOUNTER — OFFICE VISIT (OUTPATIENT)
Dept: MATERNAL FETAL MEDICINE | Facility: CLINIC | Age: 43
End: 2023-02-21
Attending: OBSTETRICS & GYNECOLOGY
Payer: COMMERCIAL

## 2023-02-21 ENCOUNTER — HOSPITAL ENCOUNTER (OUTPATIENT)
Dept: ULTRASOUND IMAGING | Facility: CLINIC | Age: 43
Discharge: HOME | End: 2023-02-21
Attending: OBSTETRICS & GYNECOLOGY
Payer: COMMERCIAL

## 2023-02-21 ENCOUNTER — LAB (OUTPATIENT)
Dept: LAB | Facility: CLINIC | Age: 43
End: 2023-02-21
Attending: OBSTETRICS & GYNECOLOGY
Payer: COMMERCIAL

## 2023-02-21 VITALS
DIASTOLIC BLOOD PRESSURE: 80 MMHG | OXYGEN SATURATION: 97 % | RESPIRATION RATE: 18 BRPM | HEART RATE: 67 BPM | SYSTOLIC BLOOD PRESSURE: 118 MMHG

## 2023-02-21 DIAGNOSIS — O09.521 SUPERVISION OF ELDERLY MULTIGRAVIDA IN FIRST TRIMESTER: Primary | ICD-10-CM

## 2023-02-21 DIAGNOSIS — O09.899 HISTORY OF PRETERM DELIVERY, CURRENTLY PREGNANT: ICD-10-CM

## 2023-02-21 DIAGNOSIS — R76.8 POSITIVE ANA (ANTINUCLEAR ANTIBODY): ICD-10-CM

## 2023-02-21 DIAGNOSIS — R76.8 SS-A ANTIBODY POSITIVE: ICD-10-CM

## 2023-02-21 DIAGNOSIS — O09.521 SUPERVISION OF ELDERLY MULTIGRAVIDA IN FIRST TRIMESTER: ICD-10-CM

## 2023-02-21 PROCEDURE — 36415 COLL VENOUS BLD VENIPUNCTURE: CPT

## 2023-02-21 PROCEDURE — 86146 BETA-2 GLYCOPROTEIN ANTIBODY: CPT

## 2023-02-21 PROCEDURE — 86147 CARDIOLIPIN ANTIBODY EA IG: CPT

## 2023-02-21 PROCEDURE — 999N000069 HC STATISTIC GENETIC COUNSELING, < 16 MIN: Performed by: GENETIC COUNSELOR, MS

## 2023-02-21 PROCEDURE — 76813 OB US NUCHAL MEAS 1 GEST: CPT | Mod: 26 | Performed by: OBSTETRICS & GYNECOLOGY

## 2023-02-21 PROCEDURE — 76813 OB US NUCHAL MEAS 1 GEST: CPT

## 2023-02-21 PROCEDURE — 85730 THROMBOPLASTIN TIME PARTIAL: CPT

## 2023-02-21 PROCEDURE — G0463 HOSPITAL OUTPT CLINIC VISIT: HCPCS | Mod: 25 | Performed by: OBSTETRICS & GYNECOLOGY

## 2023-02-21 PROCEDURE — 85390 FIBRINOLYSINS SCREEN I&R: CPT | Mod: 26 | Performed by: PATHOLOGY

## 2023-02-21 PROCEDURE — 99215 OFFICE O/P EST HI 40 MIN: CPT | Mod: 25 | Performed by: OBSTETRICS & GYNECOLOGY

## 2023-02-21 ASSESSMENT — PAIN SCALES - GENERAL: PAINLEVEL: NO PAIN (0)

## 2023-02-21 NOTE — NURSING NOTE
Earnestine here in clinic for GC, NT US and MFM Consult. Pt met with GC, see separate note. Mela Christianson and Slim reviewed US and met with pt, see consult note. Plan for pt to have NIPT and labs drawn today. Map to lab given. Future US's ordered, pt to  to schedule.  Leslie Montelongo RN

## 2023-02-21 NOTE — PROGRESS NOTES
[facility] Maternal Fetal Medicine Center  Genetic Counseling Consult    Patient:  Earnestine Guillen YOB: 1980   Date of Service:  23   MRN: [id number]    Earnestine was seen at the [facility] for genetic consultation. The indication for genetic counseling is advanced maternal age. The patient was unaccompanied to this visit.  The patient is wearing a mask due to current [facility] policies.     The session was conducted in English.      IMPRESSION/ PLAN   1. Earnestine has not had genetic screening in this pregnancy but elected to have screening today.     2. During today's MF visit, Earnestine had a blood draw for NIPS through [facility]. The NIPS screens for trisomy 21, 18, and 13 and the patient opted to screen for sex chromosome aneuploidies, including reported fetal sex. Results are expected in 7-10 days. The patient will be called with results and if they do not answer they requested a detailed message with results on their voicemail, including the predicted fetal sex information. Patient was informed that results, including fetal sex, will be available in [employer].    3. Earnestine had a nuchal translucency ultrasound today. Please see the ultrasound report for further details.     4. Earnestine had a MFM consultation today. Please see the ultrasound report or consult note for more further details.     5. Further recommendations include an early second trimester ultrasound with MFM and maternal serum AFP only screening for neural tube defects, if desired (quad screen should NOT be performed). Later in the pregnancy, recommendations also include a fetal anatomy ultrasound with MFM around 18-20 weeks. The upcoming ultrasound has been scheduled for 2023.     PREGNANCY HISTORY   /Parity:       Earnestine's pregnancy history is significant for:     : Term, healthy female    : Term, healthy male    : Term, healthy male    :  "SAB    : SAB    : PATRICIA Colby has previously had a recurrent loss work-up including a karyotype which was normal 46, XX for Earnestine. Her partner, Ike never had a karyotype drawn due to cost.     CURRENT PREGNANCY   Current Age: 42 year old   Age at Delivery: 42 year old  KRISHNA: 2023, by Last Menstrual Period                                   Gestational Age: 11w4d  This pregnancy is a single gestation.   This pregnancy was conceived spontaneously.    MEDICAL HISTORY   Earnestine s reported medical history is not expected to impact pregnancy management or risks to fetal development.       FAMILY HISTORY   A three-generation pedigree was obtained previously by a genetic counselor on 2022 and updated today. The original and updated version is scanned under the \"Media\" tab in Epic.  The family history was reported by Earnestine.    The following significant updates were reported today:     Earnestine and her partner, Ike, are both still healthy.     Earnestine's children from a previous partner are healthy.    Earnestine and Ike's son is healthy    No other updates     Otherwise, the reported family history is unremarkable for multiple miscarriages, stillbirths, birth defects, intellectual disabilities, known genetic conditions, and consanguinity.       CARRIER SCREENING   Expanded carrier screening is available to screen for autosomal recessive conditions and X-linked conditions in a large list of genes. Autosomal recessive conditions happen when a mutation has been inherited from the egg and sperm and include conditions like cystic fibrosis, thalassemia, hearing loss, spinal muscular atrophy, and more. X-linked conditions happen when a mutation has been inherited from the egg and include conditions like fragile X syndrome.  screening was also reviewed. About [place] Screening    The patient has declined the carrier screening options today. They are aware the option will remain, and they can contact us if they " would like to pursue screening.       RISK ASSESSMENT FOR CHROMOSOME CONDITIONS   We explained that the risk for fetal chromosome abnormalities increases with maternal age. We discussed specific features of common chromosome abnormalities, including Down syndrome, trisomy 13, trisomy 18, and sex chromosome trisomies.      At age 42 at midtrimester, the risk to have a baby with Down syndrome is 1 in 42.     At age 42 at midtrimester, the risk to have a baby with any chromosome abnormality is 1 in 25.     Earnestine has not had genetic screening in this pregnancy but elected to have screening today.      GENETIC TESTING OPTIONS   Genetic testing during a pregnancy includes screening and diagnostic procedures.      Screening tests are non-invasive which means no risk to the pregnancy and includes ultrasounds and blood work. The benefits and limitations of screening were reviewed. Screening tests provide a risk assessment (chance) specific to the pregnancy for certain fetal chromosome abnormalities but cannot definitively diagnose or exclude a fetal chromosome abnormality. Follow-up genetic counseling and consideration of diagnostic testing is recommended with any abnormal screening result. Diagnostic testing during a pregnancy is more certain and can test for more conditions. However, the tests do have a risk of miscarriage that requires careful consideration. These tests can detect fetal chromosome abnormalities with greater than 99% certainty. Results can be compromised by maternal cell contamination or mosaicism and are limited by the resolution of current genetic testing technology.     There is no screening or diagnostic test that detects all forms of birth defects or intellectual disability.     We discussed the following screening options:   Non-invasive prenatal testing (NIPT)    Also called cell-free DNA screening because it detects chromosomes from the placenta in the pregnant person's blood    Can be done any  time after 10 weeks gestation    Screens for trisomy 21, trisomy 18, trisomy 13, and sex chromosome aneuploidies    Cannot screen for open neural tube defects, maternal serum AFP after 15 weeks is recommended    We discussed the following ultrasound options:  Comprehensive level II ultrasound (Fetal Anatomy Ultrasound)    Ultrasound done between 18-20 weeks gestation    Screens for major birth defects and markers for aneuploidy (like trisomy 21 and trisomy 18)    Includes looking at the fetus/baby's growth, heart, organs (stomach, kidneys), placenta, and amniotic fluid    We discussed the following diagnostic options:   Amniocentesis    Invasive diagnostic procedure done after 15 weeks gestation    The procedure collects a small sample of amniotic fluid for the purpose of chromosomal testing and/or other genetic testing    Diagnostic result; more than 99% sensitivity for fetal chromosome abnormalities    Testing for AFP in the amniotic fluid can test for open neural tube defects    It was a pleasure to be involved with [facility] care. Face-to-face time of the meeting was 15 minutes.    Paola Jensen GC, MS, [facility]  Certified and [place] Licensed Genetic Counselor  [facility]  Maternal Fetal Medicine  Office: [phone]  [facility]: [phone]   Fax: [phone]  [facility]

## 2023-02-22 LAB
B2 GLYCOPROT1 IGG SERPL IA-ACNC: 1.7 U/ML
B2 GLYCOPROT1 IGM SERPL IA-ACNC: <2.4 U/ML
CARDIOLIPIN IGG SER IA-ACNC: <2 GPL-U/ML
CARDIOLIPIN IGG SER IA-ACNC: NEGATIVE
CARDIOLIPIN IGM SER IA-ACNC: 3.3 MPL-U/ML
CARDIOLIPIN IGM SER IA-ACNC: NEGATIVE
DRVVT SCREEN RATIO: 0.82
INR PPP: 1.02 (ref 0.85–1.15)
LA PPP-IMP: NEGATIVE
LUPUS INTERPRETATION: NORMAL
PTT RATIO: 0.95
THROMBIN TIME: 16.9 SECONDS (ref 13–19)

## 2023-02-22 NOTE — TELEPHONE ENCOUNTER
I ran a test claim for cash to see price. [facility]ru [facility] it is only $6.29 with no insurance. Not sure other pharmacy pricing but can't believe it would be much different. I have sent order to pharmacy as not sure price would be cheaper with Appeal.

## 2023-02-22 NOTE — TELEPHONE ENCOUNTER
PRIOR AUTHORIZATION DENIED    Medication: Ketone Strips - PA Denied     Denial Date: 2/21/2023    Denial Rational:     Appeal Information:

## 2023-02-27 ENCOUNTER — TELEPHONE (OUTPATIENT)
Dept: MATERNAL FETAL MEDICINE | Facility: CLINIC | Age: 43
End: 2023-02-27

## 2023-02-27 ENCOUNTER — VIRTUAL VISIT (OUTPATIENT)
Dept: EDUCATION SERVICES | Facility: CLINIC | Age: 43
End: 2023-02-27
Payer: COMMERCIAL

## 2023-02-27 DIAGNOSIS — O24.410 DIET CONTROLLED GESTATIONAL DIABETES MELLITUS (GDM) IN SECOND TRIMESTER: Primary | ICD-10-CM

## 2023-02-27 LAB — SCANNED LAB RESULT: NORMAL

## 2023-02-27 PROCEDURE — 98966 PH1 ASSMT&MGMT NQHP 5-10: CPT | Mod: 93 | Performed by: DIETITIAN, REGISTERED

## 2023-02-27 NOTE — PATIENT INSTRUCTIONS
Get your blood glucose monitor and start testing blood sugars.  Before breakfast with goal of under 95 mg/dL and one hour post breakfast, lunch and dinner.  With goal of under 140 mg/dL (if you forget at 1 hour and do the 2 hour then under 120 mg/dL)      Continue to watch carbohydrate grams  30 grams breakfast  15-30 grams snack  45-60 grams lunch  15-30 gram snack  45-60 grams dinner  15-30 grams snack

## 2023-02-27 NOTE — TELEPHONE ENCOUNTER
Called and discussed normal NIPT results with Earnestine. Results indicate NO ANEUPLOIDY DETECTED for chromosomes 21, 18, 13, or sex chromosomes (XY). Sex disclosed per patient's wishes.      This puts her current pregnancy at low risk for Down syndrome, trisomy 18, trisomy 13 and sex chromosome abnormalities. This test is reported to have the following sensitivities: Down syndrome- 99%, trisomy 18- 98%, and trisomy 13- 98%. Although these results are reassuring, this does not replace a standard chromosome analysis from a chorionic villus sampling or amniocentesis.     Level II ultrasound is recommended, given AMA and is scheduled for 03/27/23.     MSAFP is the appropriate second trimester screening test for open neural tube defects; the maternal quad screen is not recommended.    Her results are available in her Epic chart for her primary OB to review.    Paola Jensen MS, [facility]  Licensed Genetic Counselor   [facility]  Maternal Fetal Medicine  [email or web address]  [email or web address]  Office: [phone]  Pager [phone]  [facility]: [phone]   Fax: [phone]

## 2023-02-27 NOTE — PROGRESS NOTES
Diabetes Self-Management Education & Support    SUBJECTIVE/OBJECTIVE:  Presents for education related to gestational diabetes.    Accompanied by: Self  Diabetes management related comments/concerns: Is it ok I have a milk shake for breakfast  Gestational weeks: 11w3d  Previously had Gestational Diabetes: Yes  Had Diabetes Education before: Yes  Previous insulin or other diabetes medication during that pregnancy: No  Have you ever had thyroid problems or taken thyroid medication?: No  Heart disease, mitral valve prolapse or rheumatic fever?: No  Hypertension : No  High Cholesterol: No  High Triglycerides: No  Do you use tobacco products?: No  Do you drink beer, wine or hard liquor?: No    Cultural Influences/Ethnic Background:  Choose not to answer      LMP 2022 (Exact Date)       Estimated Date of Delivery: Sep 8, 2023    Blood Glucose/Ketone Log:   Does not have meter/strips or ketostix yet. Stated pharmacy just got her strips in and she was getting them today.      Lifestyle and Health Behaviors:  Pre-pregnancy weight (lbs): 136  Exercise:: Unable to exercise (active job)  Barrier to exercise: Time  Cultural/Pentecostal diet restrictions?: No  Meal planning/habits: Avoiding sweets  How many times a week on average do you eat food made away from home (restaurant/take-out)?: 1  Meals include: Breakfast, Lunch, Dinner, Morning Snack, Afternoon Snack  Breakfast: today: two eggs, tomatoes and onions, 1 wheat bread, had sugar free juice mix  Lunch: two corn tortilla, beans 1/2 cup, pc meat.  bowl salad.  Dinner: supper: chicken breast, rice 1/2-3/4 cup, green beans, water  Snacks: snacks; granola bar or hard boiled eggs or apples or strawberries  Beverages: Water, Juice (sometimes orange or apple juice at breakfast)  How many servings of fruits/vegetables per day: 4  Biggest challenges to healthy eating: Portion control  Pre-riaz vitamin?: Yes (tablet)  Supplements?: No  Experiencing nausea?: Yes  Experiencing  heartburn?: No    Healthy Coping:  Emotional response to diabetes: Ready to learn  Informal Support system:: Family  Stage of change: ACTION (Actively working towards change)    Current Management:  Taking medications for gestational diabetes?: No  Difficulty affording diabetes medication?: No  Difficulty affording diabetes testing supplies?: No    ASSESSMENT:  -she is watching her meal plan and keeping an eye on portions of rice, tortillas and beans.  Meal plan looks better.  -she is mixing a sugar free juice packet   -reviewed where we want her BG levels to be at when she gets her meter.    -she only can do Monday am appt she stated, I will find someone who can do this for her in the next two weeks.  -pt is aware of risk of type 2 diabetes with gestational dx, previous GDM as well (no insulin with that GDM pregnancy).  INTERVENTION:  Educational topics covered today:  What to expect after delivery, Future testing for Type 2 diabetes (2 hour OGTT at 6 week post-partum check-up and annual fasting blood glucose level), Risk of GDM and planning ahead for future pregnancies, Recommended lifestyle interventions for reducing the risk of Type 2 Diabetes, When to Call a Diabetes Educator or OB Provider    PLAN:  Check glucose 4 times daily.  Check ketones once a week when readings are consistently negative.  Continue with recommended physical activity.  Continue to follow recommended meal plan: 30 carbs at breakfast, 45-60 carbs at lunch, 45-60 carbs at supper, 15-30 carbs at snacks.  Follow consistent CHO meal plan, eat CHO and protein/fat at all meals/snacks.    Call/e-mail/[employer] message diabetes educator if 3 or more blood sugars are above the goal in 1 week or if ketones are positive.      Time Spent: 8 minutes  Encounter Type: Individual    Any diabetes medication dose changes were made via the CDE Protocol and Collaborative Practice Agreement with the patient's OB/GYN provider. A copy of this encounter was shared  with the provider.

## 2023-03-02 LAB
ABO/RH(D): NORMAL
ANTIBODY SCREEN: NEGATIVE
SPECIMEN EXPIRATION DATE: NORMAL

## 2023-03-03 ENCOUNTER — PRENATAL OFFICE VISIT (OUTPATIENT)
Dept: FAMILY MEDICINE | Facility: CLINIC | Age: 43
End: 2023-03-03
Payer: COMMERCIAL

## 2023-03-03 VITALS
SYSTOLIC BLOOD PRESSURE: 104 MMHG | DIASTOLIC BLOOD PRESSURE: 62 MMHG | WEIGHT: 134 LBS | HEART RATE: 68 BPM | BODY MASS INDEX: 25.95 KG/M2

## 2023-03-03 DIAGNOSIS — R76.8 POSITIVE ANA (ANTINUCLEAR ANTIBODY): ICD-10-CM

## 2023-03-03 DIAGNOSIS — O24.410 DIET CONTROLLED GESTATIONAL DIABETES MELLITUS (GDM) IN FIRST TRIMESTER: ICD-10-CM

## 2023-03-03 DIAGNOSIS — R76.8 SS-A ANTIBODY POSITIVE: ICD-10-CM

## 2023-03-03 DIAGNOSIS — R73.03 PREDIABETES: ICD-10-CM

## 2023-03-03 DIAGNOSIS — Z87.59 HISTORY OF PRETERM PREMATURE RUPTURE OF MEMBRANES (PPROM): ICD-10-CM

## 2023-03-03 DIAGNOSIS — O09.529 SUPERVISION OF HIGH-RISK PREGNANCY OF ELDERLY MULTIGRAVIDA: Primary | ICD-10-CM

## 2023-03-03 DIAGNOSIS — N96 HISTORY OF RECURRENT MISCARRIAGES: ICD-10-CM

## 2023-03-03 DIAGNOSIS — M35.01 SJOGREN'S SYNDROME WITH KERATOCONJUNCTIVITIS SICCA (H): ICD-10-CM

## 2023-03-03 DIAGNOSIS — O24.419 GESTATIONAL DIABETES MELLITUS (GDM) IN FIRST TRIMESTER, GESTATIONAL DIABETES METHOD OF CONTROL UNSPECIFIED: ICD-10-CM

## 2023-03-03 DIAGNOSIS — Z86.32 HISTORY OF DIET CONTROLLED GESTATIONAL DIABETES MELLITUS (GDM): ICD-10-CM

## 2023-03-03 PROBLEM — K75.4 HEPATITIS, AUTOIMMUNE (H): Chronic | Status: RESOLVED | Noted: 2018-09-06 | Resolved: 2023-03-03

## 2023-03-03 LAB
ALBUMIN SERPL BCG-MCNC: 4 G/DL (ref 3.5–5.2)
ALP SERPL-CCNC: 71 U/L (ref 35–104)
ALT SERPL W P-5'-P-CCNC: 30 U/L (ref 10–35)
ANION GAP SERPL CALCULATED.3IONS-SCNC: 10 MMOL/L (ref 7–15)
AST SERPL W P-5'-P-CCNC: 27 U/L (ref 10–35)
BILIRUB SERPL-MCNC: 0.3 MG/DL
BUN SERPL-MCNC: 13 MG/DL (ref 6–20)
CALCIUM SERPL-MCNC: 9.1 MG/DL (ref 8.6–10)
CHLORIDE SERPL-SCNC: 105 MMOL/L (ref 98–107)
CREAT SERPL-MCNC: 0.49 MG/DL (ref 0.51–0.95)
DEPRECATED HCO3 PLAS-SCNC: 21 MMOL/L (ref 22–29)
ERYTHROCYTE [DISTWIDTH] IN BLOOD BY AUTOMATED COUNT: 13.3 % (ref 10–15)
GFR SERPL CREATININE-BSD FRML MDRD: >90 ML/MIN/1.73M2
GLUCOSE SERPL-MCNC: 86 MG/DL (ref 70–99)
HCT VFR BLD AUTO: 36.7 % (ref 35–47)
HGB BLD-MCNC: 12.6 G/DL (ref 11.7–15.7)
MCH RBC QN AUTO: 27.5 PG (ref 26.5–33)
MCHC RBC AUTO-ENTMCNC: 34.3 G/DL (ref 31.5–36.5)
MCV RBC AUTO: 80 FL (ref 78–100)
PLATELET # BLD AUTO: 183 10E3/UL (ref 150–450)
POTASSIUM SERPL-SCNC: 3.7 MMOL/L (ref 3.4–5.3)
PROT SERPL-MCNC: 7.3 G/DL (ref 6.4–8.3)
RBC # BLD AUTO: 4.59 10E6/UL (ref 3.8–5.2)
SODIUM SERPL-SCNC: 136 MMOL/L (ref 136–145)
WBC # BLD AUTO: 4.8 10E3/UL (ref 4–11)

## 2023-03-03 PROCEDURE — 86900 BLOOD TYPING SEROLOGIC ABO: CPT | Performed by: FAMILY MEDICINE

## 2023-03-03 PROCEDURE — 87389 HIV-1 AG W/HIV-1&-2 AB AG IA: CPT | Performed by: FAMILY MEDICINE

## 2023-03-03 PROCEDURE — 80053 COMPREHEN METABOLIC PANEL: CPT | Performed by: FAMILY MEDICINE

## 2023-03-03 PROCEDURE — 86901 BLOOD TYPING SEROLOGIC RH(D): CPT | Performed by: FAMILY MEDICINE

## 2023-03-03 PROCEDURE — 93005 ELECTROCARDIOGRAM TRACING: CPT | Performed by: FAMILY MEDICINE

## 2023-03-03 PROCEDURE — 99215 OFFICE O/P EST HI 40 MIN: CPT | Performed by: FAMILY MEDICINE

## 2023-03-03 PROCEDURE — 93010 ELECTROCARDIOGRAM REPORT: CPT | Performed by: INTERNAL MEDICINE

## 2023-03-03 PROCEDURE — 86780 TREPONEMA PALLIDUM: CPT | Performed by: FAMILY MEDICINE

## 2023-03-03 PROCEDURE — 86850 RBC ANTIBODY SCREEN: CPT | Performed by: FAMILY MEDICINE

## 2023-03-03 PROCEDURE — 85027 COMPLETE CBC AUTOMATED: CPT | Performed by: FAMILY MEDICINE

## 2023-03-03 PROCEDURE — 86762 RUBELLA ANTIBODY: CPT | Performed by: FAMILY MEDICINE

## 2023-03-03 PROCEDURE — 87340 HEPATITIS B SURFACE AG IA: CPT | Performed by: FAMILY MEDICINE

## 2023-03-03 PROCEDURE — 86803 HEPATITIS C AB TEST: CPT | Performed by: FAMILY MEDICINE

## 2023-03-03 PROCEDURE — 36415 COLL VENOUS BLD VENIPUNCTURE: CPT | Performed by: FAMILY MEDICINE

## 2023-03-03 PROCEDURE — 87086 URINE CULTURE/COLONY COUNT: CPT | Performed by: FAMILY MEDICINE

## 2023-03-03 PROCEDURE — 99207 PR FIRST OB VISIT: CPT | Performed by: FAMILY MEDICINE

## 2023-03-03 NOTE — PROGRESS NOTES
First OB Visit     ASSESSMENT/PLAN    13w0d      Supervision of high-risk pregnancy of elderly multigravida  - MFM following for her hx of AMA, Sjogrens (+SSA/SSB), recurrent miscarriages, hx GDMA1 (now with pregestational prediabetes 5.7% A1c @ 7wks), hx pre term birth. Will need cervical ultrasounds.  Her karyotype has been normal. Ike hasn't had his done yet - not sure if covered by insurance.   - MFM and ENDO notes since last visit reviewed in detail : 2023, 2023  - NIPT normal,   - It's a BOY!!    Hx recurrent pregnancy loss  No recent spotting. No evidence for CHERYL on dating US  - Has seen MFM for pre pregnancy consult in Nov. Updated labs 23 for LA, Cardiolipin, B2G, etc were reassuring/normal  - TSH/TPO done on 1/3/23 and both are negative/normal range  - maternal karyotype normal  - will need growth ultrasounds 28wk and 34wk     Hx of PPROM/spontaneous late  birth  MFM Recommendations:    NOT recommended to proceed with [place] this pregnancy     Early ultrasound to confirm KRISHNA. (done)    Optimize modifiable risk factors: smoking cessation, avoidance of cocaine, maintenance of adequate nutrition.    Baseline transvaginal cervical length at 16 weeks' gestation with measurements every 2 weeks through 23 weeks.    cervix 30mm at 7wk dating ultrasound    Ultrasound-indicated cerclage should be considered if shortening <25 mm is diagnosed <24 weeks.    Administration of  corticosteroids if the patient is deemed to be at increased risk of imminent  delivery.    Clinical evaluation of  labor symptoms.         + SSA/SSB antibodies/Sjogren's syndrome  Hx autoimmune hepatitis; resolved.   Symptoms stable; continue Rheumatologic meds (plaquenil, imuran). Understands risk for  lupus (rash and congenital heart block) The risk of developing heart block in offspring of women with anti-Ro/SSA antibodies in the absence of a prior birth with  lupus syndrome  is approximately 1-2%.  It increases to 15-20% if a previous child was affected (baby Ike is not yet affected).    MFM Recommendations    Baseline creatinine, liver function tests, platelets and urine P/Cr ratio (ordered)     q trimester CMP  - Fetal Echos/NM intervals at 16, 20, 24 weeks with Pediatric Cardiology.  - NM interval with MFM at 18, 22 and 26 weeks.  - Baseline EKG today was done; normal    Pregestational Prediabetes  History of diet controlled gestational diabetes mellitus (GDM)  A1c 5.7% @ 7wks visit; we proceeded with 1hr and 3hr GTTs which were very high.   - See full MFM consult; pt has been fully counseled through MFM and reiterated today with myself  all the risks to pregnacy regarding her complex medical conditions and she continues to desire prenatal care with me as her PCP who knows her conditions well. She understands risk for fetal demise or other complications.   - Monitoring reviewed; Needs Rxes for lancets & test strips; sent today  - Following with GDM educator, Endocrinology and MFM  - Continues daily 10K+ steps at work; encouraged movement after meals  - Already gets eye exams for her plaquenil so will add in conversation about prediabetes monitoring    MFM recs:  Baseline laboratories  ? Hemoglobin A1C, and every trimester thereafter  ? Thyroid stimulating hormone (TSH) completed  ? Urine Pro/Cr ratio completed  ? Baseline liver function tests, platelet count, and serum creatinine completed  ? Baseline EKG (done at [facility])  ? Ophthalmologic examination  ? Urine cultures every trimester and aggressive treatment of bacteriuria      surveillance    MSAFP at 15-20 weeks to screen for open neural tube defects    Comprehensive ultrasound at 18-20 weeks    Fetal echocardiogram as per above    Serial ultrasounds for fetal growth monthly starting at 24 weeks gestation given co-morbidities present     fetal testing with twice weekly BPP at 32 weeks; if the patient is poorly  controlled weekly testing should be initiated at 28 weeks and increased to twice weekly at 32 weeks     Delivery    If well-controlled, delivery at 39 weeks is recommended    For women with poorly controlled diabetes, a delivery before 39 weeks may be indicated, and we can help make recommendations as the pregnancy continues    Maternal glucose should be maintained at  mg/dL during delivery; this can be done with an insulin drip and requires hourly finger stick assessment in active labor      Discussed:  - Pre-pregnancy Body mass index is 25.95 kg/m .  - Recommended weight gain of  15 to 25 lbs for overweight BMI.  - Influenza vaccine declines  - COVID booster declines; hx COVID in early pregnancy s/p paxlovid  - Safe medications during pregnancy and prenatal vitamin daily discussed.   - Healthy habits including not using tobacco or alcohol, exercising regularly and maintaining healthy diet  - Information given on tips for dealing with nausea, healthy habits, exposures, safety, prenatal appointment schedule, and when to call the doctor.  - Recommendations for breastfeeding given  - Preeclampsia risk factors:    High risk factors (1+): Hx pregestational diabetes (A1c 5.9% at 8wks) and hx autoimmune disease    Moderate risk factors (2+): age 35 or older    Based on her risk factors, Earnestine is at high risk of preeclampsia (1+ high risk factor or 2+ moderate risk factors).  Initiate low-dose aspirin (81 mg/day) prophylaxis between 12 weeks and 28 weeks of gestation (optimally before 16 weeks) and continue daily until delivery.       Follow up in 4 weeks for routine pre- care.         45min spent (on date of encounter) outside of routine prenatal care doing chart review, patient visit and documentation including review of extensive well outlined plans/notes per MFM, endocrinology and her specialists.     Rebecca Mckenna MD                SUBJECTIVE:  Earnestine Guillen is a 42 year old  female who  presents to clinic for a new OB visit.     Patient's last menstrual period was 2022 (exact date).  Estimated Date of Delivery: Sep 8, 2023 via 1TUS    She is at 13w0d gestation.    This was a planned pregnancy.     She has not had any bleeding, abdominal pain or cramping since her LMP. She has  had mild nausea. No vomiting.  Weight loss has not occurred.   Wt Readings from Last 3 Encounters:   23 60.8 kg (134 lb)   23 62.9 kg (138 lb 9.6 oz)   23 62 kg (136 lb 11.2 oz)         OTHER CONCERNS:   She had prediabetic range A1c at 8wks gestation c/w pregestational prediabetes/diabetes/insulin resistance.   Has meet with GDM education and Endocrinologist.   Not yet able to check sugars as the pharmacy has been out of test strips and wasn't given Rx for lancets.   Pharmacy was also out of urine ketone strips until today and will be picking up      She also had COVID early in this pregnancy requiring Paxlovid treatment however has recovered from this.  Denies ongoing symptoms.    Dealing with insomnia; not really ruminating or anything; She can fall asleep well but if she wakes for any reason she can't fall back asleep.  wakes occasionally for nocturia needs  For her work scheduled she wakes at 430am during the week; goes to bed around 8pm or 8:30pm.    Working at the [employer] 10hr days; exhausted by end of day so doesn't want to do more exercise  Getting 10K steps or more every day she works.     Her obstetrical history is significant for see A/P and prior notes; several high risk conditions as outlined above in A/P    Relationship with FOB:   Patient does intend to breast feed.   Pregnancy history fully reviewed.    OB History    Para Term  AB Living   7 3 2 1 3 3   SAB IAB Ectopic Multiple Live Births   3 0 0 0 3      # Outcome Date GA Lbr Deon/2nd Weight Sex Delivery Anes PTL Lv   7 Current            6 2022           5 2022           4 2021           3   20 34w3d 03:47 / 00:03 2.296 kg (5 lb 1 oz) M Vag-Spont Nitrous Y JENN      Name: JIMMY SOLIS      Apgar1: 8  Apgar5: 9   2 Term 03 40w0d  3.175 kg (7 lb) M Vag-Spont  N JENN   1 Term 00 38w0d  2.722 kg (6 lb) F Vag-Spont  N JENN      Obstetric Comments   Miscarriages- all within 1st trimester:    2021   2022   May 2022       Social History     Social History Narrative    , 3 children    Non smoker    Rebecca Mckenna MD       Active Ambulatory Problems     Diagnosis Date Noted     Abnormal LFTs s/t autoimmune hepatitis      ASCUS with positive high risk HPV cervical 2017     Migraine with aura and without status migrainosus, not intractable 2018     Need for hepatitis B vaccination 2019     Positive EMELIA (antinuclear antibody) 2018     SS-A and SS-B antibody positive 2019     Sjogren's syndrome (H)iwth ++ SSA/SSB 2019     Diet controlled gestational diabetes mellitus (GDM) in second trimester 2019      premature rupture of membranes (PPROM) at 34w2d 01/10/2020      (normal spontaneous vaginal delivery) 2020     Resolved Ambulatory Problems     Diagnosis Date Noted     Hepatitis, autoimmune (H) 2018     Supervision of high-risk pregnancy of elderly multigravida 2019     Thrombocytopenia affecting pregnancy (H) 2019     Placenta previa in second trimester 10/01/2019     Multigravida of advanced maternal age  01/10/2020     Pregnant 2020     SIRS (systemic inflammatory response syndrome) (H)      Past Medical History:   Diagnosis Date     Acute pyelonephritis      Anemia      Autoimmune hepatitis (H) 2018     Diabetes mellitus (H)      Migraine      PID (acute pelvic inflammatory disease) 2018     SS-B antibody positive        The following portions of the patient's history were reviewed and updated as appropriate: allergies, current medications, past family history, past  medical history, past social history, past surgical history and problem list.    Review of Systems  A 12 point comprehensive review of systems was negative except as noted.      PHYSICAL EXAM:  /62 (BP Location: Left arm, Patient Position: Sitting, Cuff Size: Adult Regular)   Pulse 68   Wt 60.8 kg (134 lb)   LMP 12/02/2022 (Exact Date)   BMI 25.95 kg/m       GENERAL: Pleasant pregnant female, alert, well groomed.  SKIN: Warm and dry, without lesions or rashes  EYES: PERRLA, EOM intact  MOUTH: Buccal mucosa pink, moist without lesions.   NECK: Thyroid without enlargement and nodules. No cervical lymphadenopathy.  LUNGS: Clear to auscultation.  BREAST: declined  HEART: RRR without murmur.  ABDOMEN: Soft without masses , tenderness or organomegaly. No CVA tenderness. Fundus palpable at symphysis pubis. FHT 150s  MUSCULOSKELETAL: Full range of motion.  EXTREMITIES: No edema. No significant varicosities.   : declined; recent cervical measurement with [facility]    Rebecca Mckenna MD

## 2023-03-04 LAB — T PALLIDUM AB SER QL: NONREACTIVE

## 2023-03-05 LAB — BACTERIA UR CULT: NO GROWTH

## 2023-03-06 LAB
ATRIAL RATE - MUSE: 66 BPM
DIASTOLIC BLOOD PRESSURE - MUSE: NORMAL MMHG
HBV SURFACE AG SERPL QL IA: NONREACTIVE
HCV AB SERPL QL IA: NONREACTIVE
HIV 1+2 AB+HIV1 P24 AG SERPL QL IA: NONREACTIVE
INTERPRETATION ECG - MUSE: NORMAL
P AXIS - MUSE: 39 DEGREES
PR INTERVAL - MUSE: 152 MS
QRS DURATION - MUSE: 82 MS
QT - MUSE: 398 MS
QTC - MUSE: 417 MS
R AXIS - MUSE: 57 DEGREES
RUBV IGG SERPL QL IA: 22.5 INDEX
RUBV IGG SERPL QL IA: POSITIVE
SYSTOLIC BLOOD PRESSURE - MUSE: NORMAL MMHG
T AXIS - MUSE: 34 DEGREES
VENTRICULAR RATE- MUSE: 66 BPM

## 2023-03-13 ENCOUNTER — VIRTUAL VISIT (OUTPATIENT)
Dept: EDUCATION SERVICES | Facility: CLINIC | Age: 43
End: 2023-03-13
Payer: COMMERCIAL

## 2023-03-13 DIAGNOSIS — O09.529 SUPERVISION OF HIGH-RISK PREGNANCY OF ELDERLY MULTIGRAVIDA: ICD-10-CM

## 2023-03-13 DIAGNOSIS — O24.410 DIET CONTROLLED GESTATIONAL DIABETES MELLITUS (GDM) IN SECOND TRIMESTER: Primary | ICD-10-CM

## 2023-03-13 PROCEDURE — G0108 DIAB MANAGE TRN  PER INDIV: HCPCS | Mod: 93

## 2023-03-13 NOTE — PATIENT INSTRUCTIONS
Test glucose 4 times per day:   Fasting (when you first awake for the day): 95 mg/dL or below   1 hour after breakfast: 140 mg/dL or below   1 hour after lunch: 140 mg/dL or below   1 hour after dinner: 140 mg/dL or below     Please bring your meter and log book to all appointments     If you miss 1 hour after meal test, test 2 hours after the meal.  Goal 2 hours after is 120 mg/dL or below.     2.  Check your urine ketones once a week, when you first awake for the day. Goal is negative to trace.    3.  Meal Plan    Breakfast: 30 grams carbohydrate + protein   Snack: 15-30 grams carbohydrate + protein  Lunch: 45-60 grams carbohydrate + protein  Snack: 15-30 grams carbohydrate + protein  Dinner: 45-60 grams carbohydrate + protein  Snack: 15-30 grams carbohydrate + protein    A few tips:   -consume some carbohydrate every 2-3 hours while awake   -you need a minimum of 175 grams of carbohydrate per day   -fruit and cold breakfast cereal are best tolerated at lunch or later   -protein includes: cheese, eggs, fish, nuts, nut butter, chicken, turkey, beef, and pork   -snack ideas: an individual container of Greek yogurt (try Chobani Less Sugar), whole grain crackers and cheese, chocolate fairlife milk, a Kashi or KIND bar, a baseball size piece of whole fruit + nut butter (apple + peanut butter), fruit canned in it's own juice + cottage cheese    4.  Aim for 20-30 minutes of activity most days of the week.      5.  Follow up: Monday, March 20th at 11:30 AM with Maria A    6.  Call Diabetes Education at [phone] or send a [employer] message with:   -questions or concerns   -ketones that are small, moderate, or large   -3 or more blood sugars above target in a 7 day period    Thank you,     Doris Paz, MPH, RD, CDCES, LD 3/13/2023

## 2023-03-13 NOTE — LETTER
3/13/2023         RE: Earnestine Guillen  [address] [facility] [address]        Dear Colleague,    Thank you for referring your patient, Earnestine Guillen, to the [facility]. Please see a copy of my visit note below.    Diabetes and Pregnancy Follow-up  Type of Service: Telephone Visit    How would patient like to obtain AVS? Not needed    Subjective/Objective:    Earnestine Guillen was called for a scheduled BG review. Last date of communication was: ***.    Gestational diabetes is being managed with diet and activity    Taking diabetes medications: no    Estimated Date of Delivery: Sep 8, 2023    Blood Glucose/Ketone Log:    Date Ketones Fasting Post Breakfast Post Lunch Post Supper   2/28 negative 100 130 125 127   3/1 negative 94 136 143 115   3/2 negative 97 123 140 135   3/3 negative 93 118 135 127   3/4 negative 90 132 136 128   3/5 negative 95 141 129 115   3/6 negative 91 138 127 134   3/7 negative 94 120 135 127   3/8 negative 101 174 107 101   3/9 negative 102 118 115 102   3/10 negative 109 124 140 110   3/11 negative 102 114 123 130   3/12 negative 93 134 128 115   3/13 negative 85        7-7:30 PM evening meal  To bed:8:30-9 PM  Up for day 5:30 AM    Assessment:  Ketones: ketones negative x 14 days.   Fasting blood glucoses: 57% in target.  After breakfast: 85% in target.  Before lunch: n/a% in target.  After lunch: 92% in target.  Before dinner: n/a% in target.  After dinner: 100% in target.    Plan/Response:  {GDM plan:678333}    ***  Time Spent: *** minutes    Any diabetes medication dose changes were made via the CDE Protocol and Collaborative Practice Agreement with the patient's {diabetes education provider list:499956}. A copy of this encounter was shared with the provider.        Diabetes and Pregnancy Follow-up  Type of Service: Telephone Visit/ 31 minutes     [place]iginating Location (Patient Location[place]  [place] [facility] [place] of  Communication:  Telephone     Telephone Visit Start Time: 8:33 AM  Telephone Visit End Time (telephone visit stop time): 9:04 AM     How would patient like to obtain AVS? [place]      Subjective/Objective:  Earnestine Guillen was called for a scheduled BG review. Last date of communication was: 23.    Gestational diabetes is being managed with diet and activity    Taking diabetes medications: no    Estimated Date of Delivery: Sep 8, 2023    Blood Glucose/Ketone Log:    Date Ketones Fasting Post Breakfast Post Lunch Post Supper    negative 100 130 125 127   3/1 negative 94 136 143 115   3/2 negative 97 123 140 135   3/3 negative 93 118 135 127   3/4 negative 90 132 136 128   3/5 negative 95 141 129 115   3/6 negative 91 138 127 134   3/7 negative 94 120 135 127   3/8 negative 101 174 107 101   3/9 negative 102 118 115 102   3/10 negative 109 124 140 110   3/11 negative 102 114 123 130   3/12 negative 93 134 128 115   3/13 negative 85        7-7:30 PM evening meal  To bed:8:30-9 PM  Up for day 5:30 AM    Assessment:  Ketones: ketones negative x 14 days.   Fasting blood glucoses: 57% in target.  After breakfast: 85% in target.  Before lunch: n/a% in target.  After lunch: 92% in target.  Before dinner: n/a% in target.  After dinner: 100% in target    Reports having evening meal at 7-7:30 PM and going to bed at 8:30-9 PM.  States she is up for the day at about 5:30 AM.  Encouraged patient to work on having a bedtime snack of at least 15 grams of carbohydrate + protein IF going to bed 2 hours or more from when she started eating her evening meal.  Discussed how liver may be dumping glucose into blood causing elevated fasting glucoses if going too long without carbohydrates.  Discussed HS snack ideas includin glass of milk OR 1 slice whole grain bread with protein.  Patient agrees to work on this.     Given patient is early in her pregnancy, discussed that she will likely need insulin.  Discussed action, peak,  and duration of NPH insulin with patient.  Discussed appropriate sites for insulin injection.  Further education to follow if/when patient starts insulin.     Plan/Response:  1. Test glucose 4 times per day:   Fasting (when you first awake for the day): 95 mg/dL or below   1 hour after breakfast: 140 mg/dL or below   1 hour after lunch: 140 mg/dL or below   1 hour after dinner: 140 mg/dL or below     Please bring your meter and log book to all appointments     If you miss 1 hour after meal test, test 2 hours after the meal.  Goal 2 hours after is 120 mg/dL or below.     2.  Check your urine ketones once a week, when you first awake for the day. Goal is negative to trace.    3.  Meal Plan    Breakfast: 30 grams carbohydrate + protein   Snack: 15-30 grams carbohydrate + protein  Lunch: 45-60 grams carbohydrate + protein  Snack: 15-30 grams carbohydrate + protein  Dinner: 45-60 grams carbohydrate + protein  Snack: 15-30 grams carbohydrate + protein    A few tips:   -consume some carbohydrate every 2-3 hours while awake   -you need a minimum of 175 grams of carbohydrate per day   -fruit and cold breakfast cereal are best tolerated at lunch or later   -protein includes: cheese, eggs, fish, nuts, nut butter, chicken, turkey, beef, and pork   -snack ideas: an individual container of Greek yogurt (try Chobani Less Sugar), whole grain crackers and cheese, chocolate fairlife milk, a Kashi or KIND bar, a baseball size piece of whole fruit + nut butter (apple + peanut butter), fruit canned in it's own juice + cottage cheese    4.  Aim for 20-30 minutes of activity most days of the week.      5.  Follow up: Monday, March 20th at 11:30 AM with Maria A    6.  Call Diabetes Education at [phone] or send a [employer] message with:   -questions or concerns   -ketones that are small, moderate, or large   -3 or more blood sugars above target in a 7 day period    Doris Paz, MPH, RD, CDCES, LD 3/13/2023    Time Spent: 31  minutes    Any diabetes medication dose changes were made via the CDE Protocol and Collaborative Practice Agreement with the patient's referring provider. A copy of this encounter was shared with the provider.

## 2023-03-13 NOTE — LETTER
3/13/2023         RE: Earnestine Guillen  [address] [facility] [address]        Dear Colleague,    Thank you for referring your patient, Earnestine Guillen, to the [facility]. Please see a copy of my visit note below.    Diabetes and Pregnancy Follow-up  Type of Service: Telephone Visit/ 31 minutes     Originating Location (Patient Location): Home  Distant Location (Provider Location): [place] [facility]  Mode of Communication:  Telephone     Telephone Visit Start Time: 8:33 AM  Telephone Visit End Time (telephone visit stop time): 9:04 AM     How would patient like to obtain AVS? MyChart      Subjective/Objective:  Earnestine Guillen was called for a scheduled BG review. Last date of communication was: 2/27/23.    Gestational diabetes is being managed with diet and activity    Taking diabetes medications: no    Estimated Date of Delivery: Sep 8, 2023    Blood Glucose/Ketone Log:    Date Ketones Fasting Post Breakfast Post Lunch Post Supper   2/28 negative 100 130 125 127   3/1 negative 94 136 143 115   3/2 negative 97 123 140 135   3/3 negative 93 118 135 127   3/4 negative 90 132 136 128   3/5 negative 95 141 129 115   3/6 negative 91 138 127 134   3/7 negative 94 120 135 127   3/8 negative 101 174 107 101   3/9 negative 102 118 115 102   3/10 negative 109 124 140 110   3/11 negative 102 114 123 130   3/12 negative 93 134 128 115   3/13 negative 85        7-7:30 PM evening meal  To bed:8:30-9 PM  Up for day 5:30 AM    Assessment:  Ketones: ketones negative x 14 days.   Fasting blood glucoses: 57% in target.  After breakfast: 85% in target.  Before lunch: n/a% in target.  After lunch: 92% in target.  Before dinner: n/a% in target.  After dinner: 100% in target    Reports having evening meal at 7-7:30 PM and going to bed at 8:30-9 PM.  States she is up for the day at about 5:30 AM.  Encouraged patient to work on having a bedtime snack of at least 15 grams of carbohydrate + protein IF going to  bed 2 hours or more from when she started eating her evening meal.  Discussed how liver may be dumping glucose into blood causing elevated fasting glucoses if going too long without carbohydrates.  Discussed HS snack ideas includin glass of milk OR 1 slice whole grain bread with protein.  Patient agrees to work on this.     Given patient is early in her pregnancy, discussed that she will likely need insulin.  Discussed action, peak, and duration of NPH insulin with patient.  Discussed appropriate sites for insulin injection.  Further education to follow if/when patient starts insulin.     Plan/Response:  1. Test glucose 4 times per day:   Fasting (when you first awake for the day): 95 mg/dL or below   1 hour after breakfast: 140 mg/dL or below   1 hour after lunch: 140 mg/dL or below   1 hour after dinner: 140 mg/dL or below     Please bring your meter and log book to all appointments     If you miss 1 hour after meal test, test 2 hours after the meal.  Goal 2 hours after is 120 mg/dL or below.     2.  Check your urine ketones once a week, when you first awake for the day. Goal is negative to trace.    3.  Meal Plan    Breakfast: 30 grams carbohydrate + protein   Snack: 15-30 grams carbohydrate + protein  Lunch: 45-60 grams carbohydrate + protein  Snack: 15-30 grams carbohydrate + protein  Dinner: 45-60 grams carbohydrate + protein  Snack: 15-30 grams carbohydrate + protein    A few tips:   -consume some carbohydrate every 2-3 hours while awake   -you need a minimum of 175 grams of carbohydrate per day   -fruit and cold breakfast cereal are best tolerated at lunch or later   -protein includes: cheese, eggs, fish, nuts, nut butter, chicken, turkey, beef, and pork   -snack ideas: an individual container of Greek yogurt (try Chobani Less Sugar), whole grain crackers and cheese, chocolate fairlife milk, a Kashi or KIND bar, a baseball size piece of whole fruit + nut butter (apple + peanut butter), fruit canned in  it's own juice + cottage cheese    4.  Aim for 20-30 minutes of activity most days of the week.      5.  Follow up: Monday, March 20th at 11:30 AM with Maria A    6.  Call Diabetes Education at [phone] or send a [employer] message with:   -questions or concerns   -ketones that are small, moderate, or large   -3 or more blood sugars above target in a 7 day period    Doris Paz, MPH, RD, CDCES, LD 3/13/2023    Time Spent: 31 minutes    Any diabetes medication dose changes were made via the CDE Protocol and Collaborative Practice Agreement with the patient's referring provider. A copy of this encounter was shared with the provider.

## 2023-03-13 NOTE — PROGRESS NOTES
Diabetes and Pregnancy Follow-up  Type of Service: Telephone Visit/ 31 minutes     Originating Location (Patient Location): Home  Distant Location (Provider Location): [facility] [place]  Mode of Communication:  Telephone     Telephone Visit Start Time: 8:33 AM  Telephone Visit End Time (telephone visit stop time): 9:04 AM     How would patient like to obtain AVS? [place]      Subjective/Objective:  Earnestine Guillen was called for a scheduled BG review. Last date of communication was: 2/27/23.    Gestational diabetes is being managed with diet and activity    Taking diabetes medications: no    Estimated Date of Delivery: Sep 8, 2023    Blood Glucose/Ketone Log:    Date Ketones Fasting Post Breakfast Post Lunch Post Supper   2/28 negative 100 130 125 127   3/1 negative 94 136 143 115   3/2 negative 97 123 140 135   3/3 negative 93 118 135 127   3/4 negative 90 132 136 128   3/5 negative 95 141 129 115   3/6 negative 91 138 127 134   3/7 negative 94 120 135 127   3/8 negative 101 174 107 101   3/9 negative 102 118 115 102   3/10 negative 109 124 140 110   3/11 negative 102 114 123 130   3/12 negative 93 134 128 115   3/13 negative 85        7-7:30 PM evening meal  To bed:8:30-9 PM  Up for day 5:30 AM    Assessment:  Ketones: ketones negative x 14 days.   Fasting blood glucoses: 57% in target.  After breakfast: 85% in target.  Before lunch: n/a% in target.  After lunch: 92% in target.  Before dinner: n/a% in target.  After dinner: 100% in target    Reports having evening meal at 7-7:30 PM and going to bed at 8:30-9 PM.  Does not always have an HS snack. States she is up for the day at about 5:30 AM.  Encouraged patient to work on having a bedtime snack of at least 15 grams of carbohydrate + protein IF going to bed 2 hours or more from when she started eating her evening meal.  Discussed how liver may be dumping glucose into blood causing elevated fasting glucoses if going too long without carbohydrates.  Discussed HS  snack ideas includin glass of milk OR 1 slice whole grain bread with protein.  Patient agrees to work on this.     Given patient is early in her pregnancy, discussed that she will likely need insulin.  Discussed action, peak, and duration of NPH insulin with patient.  Discussed appropriate sites for insulin injection.  Further education to follow if/when patient starts insulin.     Plan/Response:  1. Test glucose 4 times per day:   Fasting (when you first awake for the day): 95 mg/dL or below   1 hour after breakfast: 140 mg/dL or below   1 hour after lunch: 140 mg/dL or below   1 hour after dinner: 140 mg/dL or below     Please bring your meter and log book to all appointments     If you miss 1 hour after meal test, test 2 hours after the meal.  Goal 2 hours after is 120 mg/dL or below.     2.  Check your urine ketones once a week, when you first awake for the day. Goal is negative to trace.    3.  Meal Plan    Breakfast: 30 grams carbohydrate + protein   Snack: 15-30 grams carbohydrate + protein  Lunch: 45-60 grams carbohydrate + protein  Snack: 15-30 grams carbohydrate + protein  Dinner: 45-60 grams carbohydrate + protein  Snack: 15-30 grams carbohydrate + protein    A few tips:   -consume some carbohydrate every 2-3 hours while awake   -you need a minimum of 175 grams of carbohydrate per day   -fruit and cold breakfast cereal are best tolerated at lunch or later   -protein includes: cheese, eggs, fish, nuts, nut butter, chicken, turkey, beef, and pork   -snack ideas: an individual container of Greek yogurt (try Chobani Less Sugar), whole grain crackers and cheese, chocolate fairlife milk, a Kashi or KIND bar, a baseball size piece of whole fruit + nut butter (apple + peanut butter), fruit canned in it's own juice + cottage cheese    4.  Aim for 20-30 minutes of activity most days of the week.      5.  Follow up:  at 11:30 AM with Maria A    6.  Call Diabetes Education at [phone] or send  a [employer] message with:   -questions or concerns   -ketones that are small, moderate, or large   -3 or more blood sugars above target in a 7 day period    Doris Paz, MPH, RD, CDCES, LD 3/13/2023    Time Spent: 31 minutes    Any diabetes medication dose changes were made via the CDE Protocol and Collaborative Practice Agreement with the patient's referring provider. A copy of this encounter was shared with the provider.

## 2023-03-20 ENCOUNTER — VIRTUAL VISIT (OUTPATIENT)
Dept: EDUCATION SERVICES | Facility: CLINIC | Age: 43
End: 2023-03-20
Payer: COMMERCIAL

## 2023-03-20 DIAGNOSIS — O24.410 DIET CONTROLLED GESTATIONAL DIABETES MELLITUS (GDM) IN SECOND TRIMESTER: Primary | ICD-10-CM

## 2023-03-20 PROCEDURE — 98966 PH1 ASSMT&MGMT NQHP 5-10: CPT | Mod: 93 | Performed by: DIETITIAN, REGISTERED

## 2023-03-20 NOTE — LETTER
3/20/2023         RE: Earnestine Slater [facility] [address]        Dear Colleague,    Thank you for referring your patient, Earnestine Guillen, to the [facility] [place]. Please see a copy of my visit note below.    Diabetes and Pregnancy Follow-up  Type of Service: Telephone Visit    How would patient like to obtain AVS? Not needed    Subjective/Objective:    Earnestine Guillen was called for a scheduled BG review. Last date of communication was: 3/13.    Gestational diabetes is being managed with diet and activity    Taking diabetes medications: no    Estimated Date of Delivery: Sep 8, 2023    Blood Glucose/Ketone Log:    Date Ketones Fasting Post Breakfast Post Lunch Post Supper   3/20 neg 91 130     3/19  95 114 138 118   3/18  92 98 110 130   3/17  85 96 114 124   3/16  84 138 140 108   3/15  90 115 108 109   3/14  83 105 115 138           Assessment:  100% of BG in target    Earnestine worked on changing up her bedtime snack, choosing protein and carbs and have had BG improvements.     Plan/Response:  Follow-up in 2 weeks- sooner with 3 elevated BG    SKIP [facility]  Triage: [phone]  [phone]    Time Spent: 10 minutes    Any diabetes medication dose changes were made via the CDE Protocol and Collaborative Practice Agreement with the patient's referring provider. A copy of this encounter was shared with the provider.

## 2023-03-20 NOTE — PROGRESS NOTES
Diabetes and Pregnancy Follow-up  Type of Service: Telephone Visit    How would patient like to obtain AVS? Not needed    Subjective/Objective:    Earnestine Guillen was called for a scheduled BG review. Last date of communication was: 3/13.    Gestational diabetes is being managed with diet and activity    Taking diabetes medications: no    Estimated Date of Delivery: Sep 8, 2023    Blood Glucose/Ketone Log:    Date Ketones Fasting Post Breakfast Post Lunch Post Supper   3/20 neg 91 130     3/19  95 114 138 118   3/18  92 98 110 130   3/17  85 96 114 124   3/16  84 138 140 108   3/15  90 115 108 109   3/14  83 105 115 138           Assessment:  100% of BG in target    Earnestine worked on changing up her bedtime snack, choosing protein and carbs and have had BG improvements.     Plan/Response:  Follow-up in 2 weeks- sooner with 3 elevated BG    SKIP [facility]  Triage: [phone]  [phone]    Time Spent: 10 minutes    Any diabetes medication dose changes were made via the CDE Protocol and Collaborative Practice Agreement with the patient's referring provider. A copy of this encounter was shared with the provider.

## 2023-03-27 ENCOUNTER — OFFICE VISIT (OUTPATIENT)
Dept: MATERNAL FETAL MEDICINE | Facility: HOSPITAL | Age: 43
End: 2023-03-27
Attending: OBSTETRICS & GYNECOLOGY
Payer: COMMERCIAL

## 2023-03-27 ENCOUNTER — ANCILLARY PROCEDURE (OUTPATIENT)
Dept: ULTRASOUND IMAGING | Facility: HOSPITAL | Age: 43
End: 2023-03-27
Attending: OBSTETRICS & GYNECOLOGY
Payer: COMMERCIAL

## 2023-03-27 ENCOUNTER — TRANSFERRED RECORDS (OUTPATIENT)
Dept: HEALTH INFORMATION MANAGEMENT | Facility: CLINIC | Age: 43
End: 2023-03-27

## 2023-03-27 ENCOUNTER — TELEPHONE (OUTPATIENT)
Dept: FAMILY MEDICINE | Facility: CLINIC | Age: 43
End: 2023-03-27

## 2023-03-27 DIAGNOSIS — O09.899 HISTORY OF PRETERM DELIVERY, CURRENTLY PREGNANT: ICD-10-CM

## 2023-03-27 DIAGNOSIS — R76.8 SS-A ANTIBODY POSITIVE: ICD-10-CM

## 2023-03-27 DIAGNOSIS — R76.8 SS-A ANTIBODY POSITIVE: Primary | ICD-10-CM

## 2023-03-27 DIAGNOSIS — R76.8 POSITIVE ANA (ANTINUCLEAR ANTIBODY): ICD-10-CM

## 2023-03-27 PROCEDURE — 76828 ECHO EXAM OF FETAL HEART: CPT | Mod: 26 | Performed by: OBSTETRICS & GYNECOLOGY

## 2023-03-27 PROCEDURE — 76805 OB US >/= 14 WKS SNGL FETUS: CPT

## 2023-03-27 PROCEDURE — 76817 TRANSVAGINAL US OBSTETRIC: CPT | Mod: 26 | Performed by: OBSTETRICS & GYNECOLOGY

## 2023-03-27 PROCEDURE — 76805 OB US >/= 14 WKS SNGL FETUS: CPT | Mod: 26 | Performed by: OBSTETRICS & GYNECOLOGY

## 2023-03-27 PROCEDURE — 99213 OFFICE O/P EST LOW 20 MIN: CPT | Mod: 25 | Performed by: OBSTETRICS & GYNECOLOGY

## 2023-03-27 NOTE — TELEPHONE ENCOUNTER
Forms dropped off at , put in CMT folder and routed to  core    Last visit 03/03/23    Call [phone] when ready to .

## 2023-03-27 NOTE — PROGRESS NOTES
[facility] Visit    Earnestine presents to [facility] clinic for ultrasound and recommendations. The following problems were addressed:    SSA positive    Tests Reviewed: cell-free DNA screen  Tests Ordered: none  Unique Records reviewed: na    Impression:  1) Herring intrauterine pregnancy at 16w 3d weeks gestational age.   2) None of the anomalies commonly detected by ultrasound were evident in the detailed fetal anatomic survey, anatomy limited by early gestational age, as described above.   3) Growth parameters and estimated fetal weight were consistent with established dates.  4) The amniotic fluid volume appeared normal.  5) Normal fetal activity for gestational age.  6) On transvaginal imaging the cervix appears long and closed.  7) The mechanical NM interval is within normal limits.    Plan:  Thank-you for referring your patient for an early anatomic ultrasound. She had cell-free DNA screening showing the expected amounts of chromosomes 21, 18 & 13.    I discussed the findings on today's ultrasound with the patient. Earnestine is scheduled for biweekly transvaginal cervical length evaluation. In 4 weeks we will also complete a comprehensive anatomic survey with transvaginal cervical length and mechanical NM interval. Additionally, fetal echocardiograms have been scheduled with pediatric cardiology for positive SSA antibodies.    Return to primary provider for continued prenatal care.    If you have questions regarding today's evaluation or if we can be of further service, please contact the [facility].    **Fetal anomalies may be present but not detected**    Kate Romero MD  Maternal Fetal Medicine    Total Time: 8 minutes spent on the date of the encounter doing chart review, history and exam, documentation and further activities as noted above.

## 2023-03-27 NOTE — NURSING NOTE
"Patient denies pain, denies contractions, leaking of fluid, or bleeding.  Reports blood sugar values \"have been good.\" Patient denies headache, visual changes, nausea/vomiting, epigastric pain related to preeclampsia. SBAR given to EDMUNDO ESPINOSA, see their note in Epic.        "

## 2023-04-03 ENCOUNTER — PRENATAL OFFICE VISIT (OUTPATIENT)
Dept: FAMILY MEDICINE | Facility: CLINIC | Age: 43
End: 2023-04-03
Payer: COMMERCIAL

## 2023-04-03 ENCOUNTER — MYC MEDICAL ADVICE (OUTPATIENT)
Dept: EDUCATION SERVICES | Facility: CLINIC | Age: 43
End: 2023-04-03

## 2023-04-03 VITALS
SYSTOLIC BLOOD PRESSURE: 116 MMHG | WEIGHT: 139.8 LBS | DIASTOLIC BLOOD PRESSURE: 70 MMHG | HEART RATE: 84 BPM | BODY MASS INDEX: 27.08 KG/M2

## 2023-04-03 DIAGNOSIS — Z86.32 HISTORY OF DIET CONTROLLED GESTATIONAL DIABETES MELLITUS (GDM): ICD-10-CM

## 2023-04-03 DIAGNOSIS — R73.03 PREDIABETES: ICD-10-CM

## 2023-04-03 DIAGNOSIS — K75.4 HEPATITIS, AUTOIMMUNE (H): ICD-10-CM

## 2023-04-03 DIAGNOSIS — Z87.59 HISTORY OF PRETERM PREMATURE RUPTURE OF MEMBRANES (PPROM): ICD-10-CM

## 2023-04-03 DIAGNOSIS — M35.01 SJOGREN'S SYNDROME WITH KERATOCONJUNCTIVITIS SICCA (H): ICD-10-CM

## 2023-04-03 DIAGNOSIS — N96 HISTORY OF RECURRENT MISCARRIAGES: ICD-10-CM

## 2023-04-03 DIAGNOSIS — O09.529 SUPERVISION OF HIGH-RISK PREGNANCY OF ELDERLY MULTIGRAVIDA: Primary | ICD-10-CM

## 2023-04-03 DIAGNOSIS — R76.8 SS-A ANTIBODY POSITIVE: ICD-10-CM

## 2023-04-03 PROCEDURE — 99207 PR COMPLICATED OB VISIT: CPT | Performed by: FAMILY MEDICINE

## 2023-04-03 NOTE — PROGRESS NOTES
MICHELLE  17w3d  Estimated Date of Delivery: Sep 8, 2023    Wt Readings from Last 3 Encounters:   23 63.4 kg (139 lb 12.8 oz)   23 60.8 kg (134 lb)   23 62.9 kg (138 lb 9.6 oz)       Chief Complaint   Patient presents with     Prenatal Care     17 Weeks       S:  Since our last visit she has met with her GI team regarding her history of autoimmune hepatitis.  This was when she was around 16 weeks.  She is on azathioprine 50 mg daily and her liver panel last was normal.    She is going to have labs for liver tests every 3 months next due in  including CBC and LFT.  They also plan to do an abdominal  ultrasound in the postpartum phase and that had been ordered.    She is also met with MFM again for monitoring and endocrinology.  100% of her blood glucoses have been in target.  The plan to see her back for education visit in about 2 weeks. BS are still in range.     She has been able to submit th FMLA forms we filled out. She has work restrictions per form.   She thinks she will stop working around 7 months along to prevent any  labor risks.     Reviewed goal to start baby ASA now that she is past 1st trimester    + fetal movement.   No vaginal bleeding, cramping, LOF.   No headaches, vision change, RUQ abdominal pain, LE swelling.       O: Vitals reviewed, see prenatal flowsheet for measurements.   appears well, no acute distress.  Normal respiratory effort.  Abdomen is soft. No LE swelling.     A/P:     Supervision of high-risk pregnancy of elderly multigravida  - MFM following for her hx of AMA, Sjogrens (+SSA/SSB), recurrent miscarriages, hx [id number] pregestational prediabetes 5.7% A1c @ 7wks this pregnancy, hx pre term birth. Will need cervical ultrasounds.  Her karyotype has been normal. Ike hasn't had his done yet - not sure if covered by insurance.   - MFM and ENDO notes since last visit reviewed in detail   - NIPT normal,   - It's a BOY!!  - Encouraged to start ASA 81mg      Hx  recurrent pregnancy loss  No recent spotting. No evidence for CHERYL on dating US  - Has seen [facility] for pre pregnancy consult in Nov. Updated labs 23 for LA, Cardiolipin, B2G, etc were reassuring/normal  - TSH/TPO done on 1/3/23 and both are negative/normal range  - maternal karyotype normal  - will need growth ultrasounds 28wk and 34wk     Hx of PPROM/spontaneous late  birth  MFM Recommendations:               NOT recommended to proceed with Destin this pregnancy     Early ultrasound to confirm KRISHNA. (done)    Optimize modifiable risk factors: smoking cessation, avoidance of cocaine, maintenance of adequate nutrition (done)    Baseline transvaginal cervical length at 16 weeks' gestation with measurements every 2 weeks through 23 weeks.       Ultrasound-indicated cerclage should be considered if shortening <25 mm is diagnosed <24 weeks.    Administration of  corticosteroids if the patient is deemed to be at increased risk of imminent  delivery.    Clinical evaluation of  labor symptoms.         + SSA/SSB antibodies/Sjogren's syndrome  Hx autoimmune hepatitis; resolved.   Symptoms stable; continue Rheumatologic meds (plaquenil, imuran). Understands risk for  lupus (rash and congenital heart block) The risk of developing heart block in offspring of women with anti-Ro/SSA antibodies in the absence of a prior birth with  lupus syndrome is approximately 1-2%.  It increases to 15-20% if a previous child was affected (baby Ike is not yet affected).    MFM Recommendations    Baseline creatinine, liver function tests, platelets and urine P/Cr ratio (ordered)     q trimester CMP  - Fetal Echos/NC intervals at 16, 20, 24 weeks with Pediatric Cardiology.  - NC interval with MFM at 18, 22 and 26 weeks.  - Baseline EKG (IOB appt) was done; normal     Pregestational Prediabetes this pregnancy  History of diet controlled gestational diabetes mellitus (GDM)  A1c 5.7% @ 7wks visit; we  proceeded with 1hr and 3hr GTTs which were very high.   - See full MFM consult; pt has been fully counseled through MFM and reiterated today with myself  all the risks to pregnacy regarding her complex medical conditions and she continues to desire prenatal care with me as her PCP who knows her conditions well. She understands risk for fetal demise or other complications.   - Monitoring reviewed; BS are in range 100% at this time.   - Following with GDM educator, Endocrinology and MFM  - Continues daily 10K+ steps at work; encouraged movement after meals  - Already gets eye exams for her plaquenil so will add in conversation about prediabetes monitoring     MFM recs:  ? A1c, CMP, LFTs, CBC every trimester  ? Urine cultures every trimester and aggressive treatment of bacteriuria  ? Baseline laboratories done at [facility] (A1c, CMP/LFTs, CBC, TSH Ucx, Pro:Cr ratio, EKG)     surveillance    MSAFP at 15-22 weeks to screen for open neural tube defects (defer to next visit with other labs)    Comprehensive ultrasound at 18-20 weeks    Fetal echocardiogram as per above    Serial ultrasounds for fetal growth monthly starting at 24 weeks gestation given co-morbidities present     fetal testing with twice weekly BPP at 32 weeks; if the patient is poorly controlled weekly testing should be initiated at 28 weeks and increased to twice weekly at 32 weeks     Delivery    If well-controlled, delivery at 39 weeks is recommended    For women with poorly controlled diabetes, a delivery before 39 weeks may be indicated, and we can help make recommendations as the pregnancy continues    Maternal glucose should be maintained at  mg/dL during delivery; this can be done with an insulin drip and requires hourly finger stick assessment in active labor          Follow up: 4 weeks     Rebecca Mckenna MD    Next 5 appointments (look out 90 days)    2023  1:30 PM  (Arrive by 1:20 PM)  Return OB Visit with Rebecca  Silvia Mckenna MD  [facility] [address] [address]  [phone]   May 08, 2023  2:00 PM  (Arrive by 1:50 PM)  Return OB Visit with Rebecca Mckenna MD  [facility] ([facility] ) [address] [address]  [phone]   Jun 12, 2023  2:00 PM  (Arrive by 1:50 PM)  Return OB Visit with Rebecca Mckenna MD  [facility] ([facility] ) [address] [address]  [phone]

## 2023-04-05 NOTE — TELEPHONE ENCOUNTER
Patient cancelled appointment with writer on 4/3/23.  Did not respond to [employer] message with an update on glucoses and urine ketones.  Declined to reschedule when reached by .  Next scheduled appointment with MICHELINE is 4/17/23.  Updating referring provider.   Doris Paz, MPH, RD, [facility], LD 4/5/2023

## 2023-04-07 ENCOUNTER — HOSPITAL ENCOUNTER (OUTPATIENT)
Dept: CARDIOLOGY | Facility: CLINIC | Age: 43
Discharge: HOME | End: 2023-04-07
Attending: OBSTETRICS & GYNECOLOGY | Admitting: OBSTETRICS & GYNECOLOGY
Payer: COMMERCIAL

## 2023-04-07 ENCOUNTER — OFFICE VISIT (OUTPATIENT)
Dept: CARDIOLOGY | Facility: CLINIC | Age: 43
End: 2023-04-07
Payer: COMMERCIAL

## 2023-04-07 DIAGNOSIS — R76.8 POSITIVE ANA (ANTINUCLEAR ANTIBODY): ICD-10-CM

## 2023-04-07 DIAGNOSIS — R76.8 SS-A ANTIBODY POSITIVE: ICD-10-CM

## 2023-04-07 DIAGNOSIS — O09.899 HISTORY OF PRETERM DELIVERY, CURRENTLY PREGNANT: ICD-10-CM

## 2023-04-07 DIAGNOSIS — O35.BXX0 FETAL CARDIAC DISEASE AFFECTING PREGNANCY, SINGLE OR UNSPECIFIED FETUS: Primary | ICD-10-CM

## 2023-04-07 PROCEDURE — 93325 DOPPLER ECHO COLOR FLOW MAPG: CPT | Mod: 26 | Performed by: PEDIATRICS

## 2023-04-07 PROCEDURE — 76825 ECHO EXAM OF FETAL HEART: CPT

## 2023-04-07 PROCEDURE — 76825 ECHO EXAM OF FETAL HEART: CPT | Mod: 26 | Performed by: PEDIATRICS

## 2023-04-07 PROCEDURE — 99202 OFFICE O/P NEW SF 15 MIN: CPT | Mod: 25 | Performed by: PEDIATRICS

## 2023-04-07 PROCEDURE — 76827 ECHO EXAM OF FETAL HEART: CPT | Mod: 26 | Performed by: PEDIATRICS

## 2023-04-07 PROCEDURE — 93325 DOPPLER ECHO COLOR FLOW MAPG: CPT

## 2023-04-07 NOTE — PROGRESS NOTES
[facility]   Heart Center Fetal Consult Note    Patient:  Earnestine Guillen MRN:  [id number]   YOB: 1980 Age:  42 year old   Date of Visit:  2023 PCP:  Rebecca Mckenna MD     Dear Doctor,     I had the pleasure of seeing Earnestnie Guillen at the [facility] on 2023 in fetal cardiology consultation for fetal echocardiogram results. She presented today accompanied by her toddler. As you know, she is a 42 year old  at 18w0d who presented for fetal echocardiogram today because of Sjogren's Syndrome and pre-gestational type II diabetes mellitus.    I performed and interpreted the fetal echocardiogram today, which demonstrated normal fetal cardiac anatomy. Normal fetal intracardiac connections. Normal right and left ventricular size and function. Fetal heart rate is regular at 149 bpm. Normal NM interval. No hydrops.    I reviewed the echo findings today with Earnestine Guillen. She is aware that the study was within normal limits with no major cardiac abnormalities. She is aware of the general limitations of fetal echocardiography. A follow-up fetal echocardiogram is recommended in 4 and 8 weeks weeks. Post-riaz EKG is recommended.     Thank you for allowing me to participate in Earnestine's care. Please do not hesitate to contact me with questions or concerns.    This visit was separate from the performance and interpretation of the ultrasound. The majority of the time (>50%) was spent in counseling and coordination of care. I spent approximately 20 minutes in face-to-face time reviewing the above considerations.    Narendra Pond M.D.  Pediatric Cardiology  [address], 5th floor, [place] [address]  Phone [phone]  Fax [phone]

## 2023-04-10 ENCOUNTER — ANCILLARY PROCEDURE (OUTPATIENT)
Dept: ULTRASOUND IMAGING | Facility: HOSPITAL | Age: 43
End: 2023-04-10
Attending: OBSTETRICS & GYNECOLOGY
Payer: COMMERCIAL

## 2023-04-10 ENCOUNTER — OFFICE VISIT (OUTPATIENT)
Dept: MATERNAL FETAL MEDICINE | Facility: HOSPITAL | Age: 43
End: 2023-04-10
Attending: OBSTETRICS & GYNECOLOGY
Payer: COMMERCIAL

## 2023-04-10 DIAGNOSIS — O09.899 HISTORY OF PRETERM DELIVERY, CURRENTLY PREGNANT: Primary | ICD-10-CM

## 2023-04-10 DIAGNOSIS — R76.8 POSITIVE ANA (ANTINUCLEAR ANTIBODY): ICD-10-CM

## 2023-04-10 DIAGNOSIS — R76.8 SS-A ANTIBODY POSITIVE: ICD-10-CM

## 2023-04-10 DIAGNOSIS — O09.899 HISTORY OF PRETERM DELIVERY, CURRENTLY PREGNANT: ICD-10-CM

## 2023-04-10 PROCEDURE — 76817 TRANSVAGINAL US OBSTETRIC: CPT

## 2023-04-10 PROCEDURE — 76817 TRANSVAGINAL US OBSTETRIC: CPT | Mod: 26 | Performed by: OBSTETRICS & GYNECOLOGY

## 2023-04-10 PROCEDURE — 99207 PR NO CHARGE LOS: CPT | Performed by: OBSTETRICS & GYNECOLOGY

## 2023-04-10 NOTE — NURSING NOTE
Earnestine Guillen is a  at 18w3d who presents to [facility] for TV ultrasound. Pt reports positive fetal movement. Pt denies bldg/lof/change in discharge, contractions, headache, vision changes, chest pain/SOB or edema. SBAR given to Dr. Romero, see note in Epic.

## 2023-04-10 NOTE — PROGRESS NOTES
"Please see \"Imaging\" tab under Chart Review for full details.    Kate Romero MD  Maternal Fetal Medicine    "

## 2023-04-17 ENCOUNTER — VIRTUAL VISIT (OUTPATIENT)
Dept: EDUCATION SERVICES | Facility: CLINIC | Age: 43
End: 2023-04-17
Payer: COMMERCIAL

## 2023-04-17 DIAGNOSIS — O24.410 DIET CONTROLLED GESTATIONAL DIABETES MELLITUS (GDM) IN SECOND TRIMESTER: Primary | ICD-10-CM

## 2023-04-17 PROCEDURE — 98967 PH1 ASSMT&MGMT NQHP 11-20: CPT | Mod: 95

## 2023-04-17 NOTE — PATIENT INSTRUCTIONS
Test glucose 4 times per day:              Fasting (when you first awake for the day): 95 mg/dL or below              1 hour after breakfast: 140 mg/dL or below              1 hour after lunch: 140 mg/dL or below              1 hour after dinner: 140 mg/dL or below                 Please bring your meter and log book to all appointments                 If you miss 1 hour after meal test, test 2 hours after the meal.  Goal 2 hours after is 120 mg/dL or below.      2.  Check your urine ketones once a week, when you first awake for the day. Goal is negative to trace.     3.  Meal Plan     Breakfast: 30 grams carbohydrate + protein   Snack: 15-30 grams carbohydrate + protein  Lunch: 45-60 grams carbohydrate + protein  Snack: 15-30 grams carbohydrate + protein  Dinner: 45-60 grams carbohydrate + protein  Snack: 15-30 grams carbohydrate + protein     A few tips:              -consume some carbohydrate every 2-3 hours while awake              -you need a minimum of 175 grams of carbohydrate per day              -fruit and cold breakfast cereal are best tolerated at lunch or later              -protein includes: cheese, eggs, fish, nuts, nut butter, chicken, turkey, beef, and pork              -snack ideas: an individual container of Greek yogurt (try Chobani Less Sugar), whole grain crackers and cheese, chocolate fairlife milk, a Kashi or KIND bar, a baseball size piece of whole fruit + nut butter (apple + peanut butter), fruit canned in it's own juice + cottage cheese     4.  Aim for 20-30 minutes of activity most days of the week (with the okay of your OB provider).       5.  Follow up: Monday, May 1st at 1 PM with Doris     6.  Call Diabetes Education at [phone] or send a [employer] message with:              -questions or concerns              -ketones that are small, moderate, or large              -3 or more blood sugars above target in a 7 day period    Thank you,   Doris Paz, MPH, RD, CDCES, LD 4/17/2023

## 2023-04-17 NOTE — PROGRESS NOTES
Diabetes and Pregnancy Follow-up  Type of Service: Telephone Visit/ 16 minutes     Originating Location (Patient Location): Home  [place] [facility] [place] of Communication:  Telephone     Telephone Visit Start Time: 12:37 PM  Telephone Visit End Time (telephone visit stop time): 12:53 PM     How would patient like to obtain AVS? [place]      Subjective/Objective:    Earnestine Guillen was called for a scheduled BG review. Last date of communication was: 3/20/23.    Gestational diabetes is being managed with diet and activity    Taking diabetes medications: no    Estimated Date of Delivery: Sep 8, 2023    Blood Glucose/Ketone Log:    Date Ketones Fasting Post Breakfast Post Lunch Post Supper   4/3 negative 85 117 128 133   4/4  95 117 124 140   4/5  94 128 131 114   4/6  90 135 120 131   4/7  86 140 128 134   4/8  88 137 140 116   4/9  83 133 132 114   4/10 negative 93 129 135 117   4/11  90 128 131 119   4/12  95 129 132 114   4/13  84 126 135 121   4/14  88 118 117 130   4/15  93 136 124 140   4/16  87 130 131 116   4/17 negative 92 128     (testing 1 hour post meal)    Assessment:  Ketones: negative x3.   Fasting blood glucoses: 100% in target.  After breakfast: 100% in target.  Before lunch: n/a% in target.  After lunch: 100% in target.  Before dinner: n/a% in target.  After dinner: 100% in target.    No questions/concerns today.  Reviewed how insulin needs change throughout pregnancy.     Plan/Response:  1. Test glucose 4 times per day:              Fasting (when you first awake for the day): 95 mg/dL or below              1 hour after breakfast: 140 mg/dL or below              1 hour after lunch: 140 mg/dL or below              1 hour after dinner: 140 mg/dL or below                 Please bring your meter and log book to all appointments                 If you miss 1 hour after meal test, test 2 hours after the meal.  Goal 2 hours after is 120 mg/dL or below.      2.  Check  your urine ketones once a week, when you first awake for the day. Goal is negative to trace.     3.  Meal Plan     Breakfast: 30 grams carbohydrate + protein   Snack: 15-30 grams carbohydrate + protein  Lunch: 45-60 grams carbohydrate + protein  Snack: 15-30 grams carbohydrate + protein  Dinner: 45-60 grams carbohydrate + protein  Snack: 15-30 grams carbohydrate + protein     A few tips:              -consume some carbohydrate every 2-3 hours while awake              -you need a minimum of 175 grams of carbohydrate per day              -fruit and cold breakfast cereal are best tolerated at lunch or later              -protein includes: cheese, eggs, fish, nuts, nut butter, chicken, turkey, beef, and pork              -snack ideas: an individual container of Greek yogurt (try Chobani Less Sugar), whole grain crackers and cheese, chocolate fairlife milk, a Kashi or KIND bar, a baseball size piece of whole fruit + nut butter (apple + peanut butter), fruit canned in it's own juice + cottage cheese     4.  Aim for 20-30 minutes of activity most days of the week (with the okay of your OB provider).       5.  Follow up: Monday, May 1st at 1 PM with Doris     6.  Call Diabetes Education at [phone] or send a [employer] message with:              -questions or concerns              -ketones that are small, moderate, or large              -3 or more blood sugars above target in a 7 day period    Doris Paz, MPH, RD, CDCES, LD 4/17/2023    Time Spent: 16 minutes    Any diabetes medication dose changes were made via the CDE Protocol and Collaborative Practice Agreement with the patient's referring provider. A copy of this encounter was shared with the provider.

## 2023-04-17 NOTE — LETTER
4/17/2023         RE: Earnestine Guillen  [address]  [facility] [address]        Dear Colleague,    Thank you for referring your patient, Earnestine Guillen, to the [facility]. Please see a copy of my visit note below.    Diabetes and Pregnancy Follow-up  Type of Service: Telephone Visit/ 16 minutes     Originating Location (Patient Location): Home  Distant Location (Provider Location): [place] [facility]  Mode of Communication:  Telephone     Telephone Visit Start Time: 12:37 PM  Telephone Visit End Time (telephone visit stop time): 12:53 PM     How would patient like to obtain AVS? MyChart      Subjective/Objective:    Earnestine Guillen was called for a scheduled BG review. Last date of communication was: 3/20/23.    Gestational diabetes is being managed with diet and activity    Taking diabetes medications: no    Estimated Date of Delivery: Sep 8, 2023    Blood Glucose/Ketone Log:    Date Ketones Fasting Post Breakfast Post Lunch Post Supper   4/3 negative 85 117 128 133   4/4  95 117 124 140   4/5  94 128 131 114   4/6  90 135 120 131   4/7  86 140 128 134   4/8  88 137 140 116   4/9  83 133 132 114   4/10 negative 93 129 135 117   4/11  90 128 131 119   4/12  95 129 132 114   4/13  84 126 135 121   4/14  88 118 117 130   4/15  93 136 124 140   4/16  87 130 131 116   4/17 negative 92 128     (testing 1 hour post meal)    Assessment:  Ketones: negative x3.   Fasting blood glucoses: 100% in target.  After breakfast: 100% in target.  Before lunch: n/a% in target.  After lunch: 100% in target.  Before dinner: n/a% in target.  After dinner: 100% in target.    No questions/concerns today.  Reviewed how insulin needs change throughout pregnancy.     Plan/Response:  1. Test glucose 4 times per day:              Fasting (when you first awake for the day): 95 mg/dL or below              1 hour after breakfast: 140 mg/dL or below              1 hour after lunch: 140 mg/dL or below              1 hour  after dinner: 140 mg/dL or below                 Please bring your meter and log book to all appointments                 If you miss 1 hour after meal test, test 2 hours after the meal.  Goal 2 hours after is 120 mg/dL or below.      2.  Check your urine ketones once a week, when you first awake for the day. Goal is negative to trace.     3.  Meal Plan     Breakfast: 30 grams carbohydrate + protein   Snack: 15-30 grams carbohydrate + protein  Lunch: 45-60 grams carbohydrate + protein  Snack: 15-30 grams carbohydrate + protein  Dinner: 45-60 grams carbohydrate + protein  Snack: 15-30 grams carbohydrate + protein     A few tips:              -consume some carbohydrate every 2-3 hours while awake              -you need a minimum of 175 grams of carbohydrate per day              -fruit and cold breakfast cereal are best tolerated at lunch or later              -protein includes: cheese, eggs, fish, nuts, nut butter, chicken, turkey, beef, and pork              -snack ideas: an individual container of Greek yogurt (try Chobani Less Sugar), whole grain crackers and cheese, chocolate fairlife milk, a Kashi or KIND bar, a baseball size piece of whole fruit + nut butter (apple + peanut butter), fruit canned in it's own juice + cottage cheese     4.  Aim for 20-30 minutes of activity most days of the week (with the okay of your OB provider).       5.  Follow up: Monday, May 1st at 1 PM with Doris     6.  Call Diabetes Education at [phone] or send a [employer] message with:              -questions or concerns              -ketones that are small, moderate, or large              -3 or more blood sugars above target in a 7 day period    Doris Paz, MPH, RD, CDCES, LD 4/17/2023    Time Spent: 16 minutes    Any diabetes medication dose changes were made via the CDE Protocol and Collaborative Practice Agreement with the patient's referring provider. A copy of this encounter was shared with the provider.

## 2023-04-24 ENCOUNTER — ANCILLARY PROCEDURE (OUTPATIENT)
Dept: ULTRASOUND IMAGING | Facility: HOSPITAL | Age: 43
End: 2023-04-24
Attending: STUDENT IN AN ORGANIZED HEALTH CARE EDUCATION/TRAINING PROGRAM
Payer: COMMERCIAL

## 2023-04-24 ENCOUNTER — OFFICE VISIT (OUTPATIENT)
Dept: MATERNAL FETAL MEDICINE | Facility: HOSPITAL | Age: 43
End: 2023-04-24
Attending: STUDENT IN AN ORGANIZED HEALTH CARE EDUCATION/TRAINING PROGRAM
Payer: COMMERCIAL

## 2023-04-24 DIAGNOSIS — R76.8 SS-A ANTIBODY POSITIVE: ICD-10-CM

## 2023-04-24 DIAGNOSIS — O09.522 AMA (ADVANCED MATERNAL AGE) MULTIGRAVIDA 35+, SECOND TRIMESTER: ICD-10-CM

## 2023-04-24 DIAGNOSIS — O09.899 HISTORY OF PRETERM DELIVERY, CURRENTLY PREGNANT: ICD-10-CM

## 2023-04-24 DIAGNOSIS — O09.899 HISTORY OF PRETERM DELIVERY, CURRENTLY PREGNANT: Primary | ICD-10-CM

## 2023-04-24 DIAGNOSIS — R76.8 POSITIVE ANA (ANTINUCLEAR ANTIBODY): ICD-10-CM

## 2023-04-24 DIAGNOSIS — Z36.89 ENCOUNTER FOR FETAL ANATOMIC SURVEY: ICD-10-CM

## 2023-04-24 PROCEDURE — 76811 OB US DETAILED SNGL FETUS: CPT

## 2023-04-24 PROCEDURE — 76811 OB US DETAILED SNGL FETUS: CPT | Mod: 26 | Performed by: STUDENT IN AN ORGANIZED HEALTH CARE EDUCATION/TRAINING PROGRAM

## 2023-04-24 PROCEDURE — 76817 TRANSVAGINAL US OBSTETRIC: CPT | Mod: 26 | Performed by: STUDENT IN AN ORGANIZED HEALTH CARE EDUCATION/TRAINING PROGRAM

## 2023-04-24 PROCEDURE — 76828 ECHO EXAM OF FETAL HEART: CPT | Mod: 26 | Performed by: STUDENT IN AN ORGANIZED HEALTH CARE EDUCATION/TRAINING PROGRAM

## 2023-04-24 PROCEDURE — 99207 PR NO CHARGE LOS: CPT | Performed by: STUDENT IN AN ORGANIZED HEALTH CARE EDUCATION/TRAINING PROGRAM

## 2023-04-24 NOTE — PROGRESS NOTES
Please see the full imaging report from the ViewPoint program under the imaging tab.    Kimber Aparicio MD  Maternal Fetal Medicine

## 2023-04-24 NOTE — NURSING NOTE
Earnestine Guillen is a  at 20w3d who presents to [facility] for L2/TV. Pt reports she is beginning to feel fetal movement. Pt denies bldg/lof/change in discharge, contractions, headache, vision changes, chest pain/SOB or edema. SBAR given to Dr. Aparicio, see note in Epic.   Leslie Montelongo RN

## 2023-04-27 ENCOUNTER — OFFICE VISIT (OUTPATIENT)
Dept: FAMILY MEDICINE | Facility: CLINIC | Age: 43
End: 2023-04-27
Payer: COMMERCIAL

## 2023-04-27 VITALS
DIASTOLIC BLOOD PRESSURE: 69 MMHG | WEIGHT: 141 LBS | RESPIRATION RATE: 15 BRPM | HEART RATE: 82 BPM | BODY MASS INDEX: 27.31 KG/M2 | SYSTOLIC BLOOD PRESSURE: 106 MMHG | TEMPERATURE: 98.2 F | OXYGEN SATURATION: 97 %

## 2023-04-27 DIAGNOSIS — H65.193 ACUTE EFFUSION OF BOTH MIDDLE EARS: Primary | ICD-10-CM

## 2023-04-27 PROCEDURE — 99213 OFFICE O/P EST LOW 20 MIN: CPT | Performed by: FAMILY MEDICINE

## 2023-04-27 NOTE — PROGRESS NOTES
"Assessment:       Acute effusion of both middle ears         Plan:     Patient with bilateral middle ear effusions and runny nose.  Possibly due to some seasonal allergies.  We will try some Benadryl, cetirizine as needed for symptoms.  Follow-up if symptoms getting worse or not improving in 1 week.      Patient Instructions   I would recommend taking some cetirizine and/or Benadryl 25 mg every 6 hours to help with your symptoms.  Follow-up if getting worse or not improving in 1 week.      Subjective:       42 year old female at 20 weeks gestation presents for evaluation of a 1 week history of bilateral ear fullness occasional headache feeling like her \"head is in a bubble\".  She had a sore throat initially last week but this is since resolved.  She has had slight runny nose.  Denies facial pain or pressure.  No cough.  She she denies fevers or chills.    Patient Active Problem List   Diagnosis     Abnormal LFTs s/t autoimmune hepatitis     ASCUS with positive high risk HPV cervical     Migraine with aura and without status migrainosus, not intractable     Need for hepatitis B vaccination     Positive EMELIA (antinuclear antibody)     SS-A and SS-B antibody positive     Sjogren's syndrome (H)iwth ++ SSA/SSB     Diet controlled gestational diabetes mellitus (GDM) in second trimester      premature rupture of membranes (PPROM) at 34w2d      (normal spontaneous vaginal delivery)       Past Medical History:   Diagnosis Date     Acute pyelonephritis      Anemia      Autoimmune hepatitis (H) 2018     Diabetes mellitus (H)     DGDM     Migraine      PID (acute pelvic inflammatory disease) 2018     Sjogren's syndrome (H)      SS-A antibody positive      SS-B antibody positive        Past Surgical History:   Procedure Laterality Date     INJECTION ANESTHETIC AGENT TO CERVICAL PLEXUS         Current Outpatient Medications   Medication     azaTHIOprine (IMURAN) 50 MG tablet     hydroxychloroquine " (PLAQUENIL) 200 MG tablet     prednisoLONE acetate (PRED FORTE) 1 % ophthalmic suspension     Prenatal Vit-Fe Fumarate-FA (PRENATAL MULTIVITAMIN W/IRON) 27-0.8 MG tablet     SUMAtriptan (IMITREX) 100 MG tablet     Acetone, Urine, Test (KETONE TEST) STRP     blood glucose (NO BRAND SPECIFIED) lancets standard     blood glucose (NO BRAND SPECIFIED) test strip     blood glucose monitoring (ONETOUCH VERIO) meter device kit     No current facility-administered medications for this visit.       Allergies   Allergen Reactions     Methocarbamol Rash     Could be an allergy with this or propranolol     Propranolol Rash     Could be an allergy with this or methocarbamol     Venlafaxine Rash       No family history on file.    Social History     Socioeconomic History     Marital status: Single     Spouse name: None     Number of children: None     Years of education: None     Highest education level: None   Tobacco Use     Smoking status: Never     Smokeless tobacco: Never   Social History Narrative    , 3 children    Non smoker    Rebecca Mckenna MD         Review of Systems  Pertinent items are noted in HPI.      Objective:                   General Appearance:    /69   Pulse 82   Temp 98.2  F (36.8  C)   Resp 15   Wt 64 kg (141 lb)   LMP 12/02/2022 (Exact Date)   SpO2 97%   BMI 27.31 kg/m          Alert, pleasant, cooperative, no distress, appears stated age   Head:    Normocephalic, without obvious abnormality, atraumatic   Eyes:    Conjunctiva/corneas clear   Ears:   Bilateral serous middle ear effusions noted with air bubbles present.  Tympanic membranes are not erythematous.  Ear canals normal bilaterally.   Nose:   Nares normal, septum midline, mucosa normal, no drainage    or sinus tenderness   Throat:   Lips, mucosa, and tongue normal; teeth and gums normal.  No tonsilar hypertrophy or exudate.   Neck:   Supple, symmetrical, trachea midline, no adenopathy    Lungs:     Clear to auscultation  bilaterally without wheezes, rales, or rhonchi, respirations unlabored    Heart:    Regular rate and rhythm, S1 and S2 normal, no murmur, rub or gallop       Extremities:   Extremities normal, atraumatic, no cyanosis or edema   Skin:   Skin color, texture, turgor normal, no rashes or lesions           This note has been dictated using voice recognition software. Any grammatical or context distortions are unintentional and inherent to the software

## 2023-04-27 NOTE — PATIENT INSTRUCTIONS
I would recommend taking some cetirizine and or Benadryl 25 mg every 6 hours to help with your symptoms.  Follow-up if getting worse or not improving in 1 week.

## 2023-05-01 ENCOUNTER — VIRTUAL VISIT (OUTPATIENT)
Dept: EDUCATION SERVICES | Facility: CLINIC | Age: 43
End: 2023-05-01
Payer: COMMERCIAL

## 2023-05-01 DIAGNOSIS — O24.410 DIET CONTROLLED GESTATIONAL DIABETES MELLITUS (GDM) IN SECOND TRIMESTER: Primary | ICD-10-CM

## 2023-05-01 PROCEDURE — 98966 PH1 ASSMT&MGMT NQHP 5-10: CPT | Mod: 95

## 2023-05-01 NOTE — LETTER
5/1/2023         RE: Earnestine Guillen  [address]  [facility] [address]        Dear Colleague,    Thank you for referring your patient, Earnestine Guillen, to the [facility]. Please see a copy of my visit note below.    Diabetes and Pregnancy Follow-up  Type of Service: Telephone Visit/ 10 minutes     Originating Location (Patient Location): [place], [place]  Distant Location (Provider Location): [place] [facility]  Mode of Communication:  Telephone     Telephone Visit Start Time: 12:59 PM  Telephone Visit End Time (telephone visit stop time): 1:09 PM     How would patient like to obtain AVS? MyChart      Subjective/Objective:    Earnestine Guillen was called for a scheduled BG review. Last date of communication was: 4/17/23.    Gestational diabetes is being managed with diet and activity    Taking diabetes medications: no    Estimated Date of Delivery: Sep 8, 2023    Blood Glucose/Ketone Log:    Date Ketones Fasting Post Breakfast Post Lunch Post Supper   4/17  92 128 130 140   4/18  91 117 130 140   4/19  95 105 138 115   4/20  83 114 135 117   4/21  93 120 132 140   4/22  90 118 130 127   4/23  83 120 137 116   4/24 negative 89 132 138 121   4/25  85 130 128 137   4/26  83 121 130 105   4/27  88 103 127 114   4/28  92 112 118 135   4/29  90 98 119 130   4/30  89 105 118 125   5/1 negative 93 123     (testing 1 hour post meals)    Assessment:  Ketones: negative.   Fasting blood glucoses: 100% in target.  After breakfast: 100% in target.  Before lunch: n/a% in target.  After lunch: 100% in target.  Before dinner: n/a% in target.  After dinner: 100% in target.    Patient with no questions/concerns today.  Encouraged her to continue her hard work.     Plan/Response:  1. Test glucose 4 times per day:              Fasting (when you first awake for the day): 95 mg/dL or below              1 hour after breakfast: 140 mg/dL or below              1 hour after lunch: 140 mg/dL or below              1 hour  after dinner: 140 mg/dL or below                 Please bring your meter and log book to all appointments                 If you miss 1 hour after meal test, test 2 hours after the meal.  Goal 2 hours after is 120 mg/dL or below.      2.  Check your urine ketones once a week, when you first awake for the day. Goal is negative to trace.     3.  Meal Plan     Breakfast: 30 grams carbohydrate + protein   Snack: 15-30 grams carbohydrate + protein  Lunch: 45-60 grams carbohydrate + protein  Snack: 15-30 grams carbohydrate + protein  Dinner: 45-60 grams carbohydrate + protein  Snack: 15-30 grams carbohydrate + protein     A few tips:              -consume some carbohydrate every 2-3 hours while awake              -you need a minimum of 175 grams of carbohydrate per day              -fruit and cold breakfast cereal are best tolerated at lunch or later              -protein includes: cheese, eggs, fish, nuts, nut butter, chicken, turkey, beef, and pork              -snack ideas: an individual container of Greek yogurt (try Chobani Less Sugar), whole grain crackers and cheese, chocolate fairlife milk, a Kashi or KIND bar, a baseball size piece of whole fruit + nut butter (apple + peanut butter), fruit canned in it's own juice + cottage cheese     4.  Aim for 20-30 minutes of activity most days of the week (with the okay of your OB provider).       5.  Follow up: Monday, May 15th at 12:30 PM with Doris     6.  Call Diabetes Education at [phone] or send a [employer] message with:              -questions or concerns              -ketones that are small, moderate, or large              -3 or more blood sugars above target in a 7 day period    Doris Paz, MPH, RD, CDCES, LD 5/1/2023    Time Spent: 10 minutes    Any diabetes medication dose changes were made via the CDE Protocol and Collaborative Practice Agreement with the patient's referring provider. A copy of this encounter was shared with the provider.

## 2023-05-01 NOTE — PROGRESS NOTES
Diabetes and Pregnancy Follow-up  Type of Service: Telephone Visit/ 10 minutes     Originating Location (Patient Location): [place], [place]  Distant Location (Provider Location): [place] [facility]  Mode of Communication:  Telephone     Telephone Visit Start Time: 12:59 PM  Telephone Visit End Time (telephone visit stop time): 1:09 PM     How would patient like to obtain AVS? [place]      Subjective/Objective:    Earnestine Guillen was called for a scheduled BG review. Last date of communication was: 4/17/23.    Gestational diabetes is being managed with diet and activity    Taking diabetes medications: no    Estimated Date of Delivery: Sep 8, 2023    Blood Glucose/Ketone Log:    Date Ketones Fasting Post Breakfast Post Lunch Post Supper   4/17  92 128 130 140   4/18  91 117 130 140   4/19  95 105 138 115   4/20  83 114 135 117   4/21  93 120 132 140   4/22  90 118 130 127   4/23  83 120 137 116   4/24 negative 89 132 138 121   4/25  85 130 128 137   4/26  83 121 130 105   4/27  88 103 127 114   4/28  92 112 118 135   4/29  90 98 119 130   4/30  89 105 118 125   5/1 negative 93 123     (testing 1 hour post meals)    Assessment:  Ketones: negative.   Fasting blood glucoses: 100% in target.  After breakfast: 100% in target.  Before lunch: n/a% in target.  After lunch: 100% in target.  Before dinner: n/a% in target.  After dinner: 100% in target.    Patient with no questions/concerns today.  Encouraged her to continue her hard work.     Plan/Response:  1. Test glucose 4 times per day:              Fasting (when you first awake for the day): 95 mg/dL or below              1 hour after breakfast: 140 mg/dL or below              1 hour after lunch: 140 mg/dL or below              1 hour after dinner: 140 mg/dL or below                 Please bring your meter and log book to all appointments                 If you miss 1 hour after meal test, test 2 hours after the meal.  Goal 2 hours after is 120 mg/dL or below.      2.  Check  your urine ketones once a week, when you first awake for the day. Goal is negative to trace.     3.  Meal Plan     Breakfast: 30 grams carbohydrate + protein   Snack: 15-30 grams carbohydrate + protein  Lunch: 45-60 grams carbohydrate + protein  Snack: 15-30 grams carbohydrate + protein  Dinner: 45-60 grams carbohydrate + protein  Snack: 15-30 grams carbohydrate + protein     A few tips:              -consume some carbohydrate every 2-3 hours while awake              -you need a minimum of 175 grams of carbohydrate per day              -fruit and cold breakfast cereal are best tolerated at lunch or later              -protein includes: cheese, eggs, fish, nuts, nut butter, chicken, turkey, beef, and pork              -snack ideas: an individual container of Greek yogurt (try Chobani Less Sugar), whole grain crackers and cheese, chocolate fairlife milk, a Kashi or KIND bar, a baseball size piece of whole fruit + nut butter (apple + peanut butter), fruit canned in it's own juice + cottage cheese     4.  Aim for 20-30 minutes of activity most days of the week (with the okay of your OB provider).       5.  Follow up: Monday, May 15th at 12:30 PM with Doris     6.  Call Diabetes Education at [phone] or send a [employer] message with:              -questions or concerns              -ketones that are small, moderate, or large              -3 or more blood sugars above target in a 7 day period    Doris Paz, MPH, RD, CDCES, LD 5/1/2023    Time Spent: 10 minutes    Any diabetes medication dose changes were made via the CDE Protocol and Collaborative Practice Agreement with the patient's referring provider. A copy of this encounter was shared with the provider.

## 2023-05-01 NOTE — PATIENT INSTRUCTIONS
Test glucose 4 times per day:              Fasting (when you first awake for the day): 95 mg/dL or below              1 hour after breakfast: 140 mg/dL or below              1 hour after lunch: 140 mg/dL or below              1 hour after dinner: 140 mg/dL or below                 Please bring your meter and log book to all appointments                 If you miss 1 hour after meal test, test 2 hours after the meal.  Goal 2 hours after is 120 mg/dL or below.      2.  Check your urine ketones once a week, when you first awake for the day. Goal is negative to trace.     3.  Meal Plan     Breakfast: 30 grams carbohydrate + protein   Snack: 15-30 grams carbohydrate + protein  Lunch: 45-60 grams carbohydrate + protein  Snack: 15-30 grams carbohydrate + protein  Dinner: 45-60 grams carbohydrate + protein  Snack: 15-30 grams carbohydrate + protein     A few tips:              -consume some carbohydrate every 2-3 hours while awake              -you need a minimum of 175 grams of carbohydrate per day              -fruit and cold breakfast cereal are best tolerated at lunch or later              -protein includes: cheese, eggs, fish, nuts, nut butter, chicken, turkey, beef, and pork              -snack ideas: an individual container of Greek yogurt (try Chobani Less Sugar), whole grain crackers and cheese, chocolate fairlife milk, a Kashi or KIND bar, a baseball size piece of whole fruit + nut butter (apple + peanut butter), fruit canned in it's own juice + cottage cheese     4.  Aim for 20-30 minutes of activity most days of the week (with the okay of your OB provider).       5.  Follow up: Monday, May 15th at 12:30 PM with Doris     6.  Call Diabetes Education at [phone] or send a [employer] message with:              -questions or concerns              -ketones that are small, moderate, or large              -3 or more blood sugars above target in a 7 day period    Thank you,   Doris Paz, MPH, RD, CDCES, LD  5/1/2023

## 2023-05-05 ENCOUNTER — OFFICE VISIT (OUTPATIENT)
Dept: PEDIATRIC CARDIOLOGY | Facility: CLINIC | Age: 43
End: 2023-05-05
Payer: COMMERCIAL

## 2023-05-05 ENCOUNTER — HOSPITAL ENCOUNTER (OUTPATIENT)
Dept: CARDIOLOGY | Facility: CLINIC | Age: 43
Discharge: HOME | End: 2023-05-05
Attending: OBSTETRICS & GYNECOLOGY | Admitting: OBSTETRICS & GYNECOLOGY
Payer: COMMERCIAL

## 2023-05-05 DIAGNOSIS — R76.8 POSITIVE ANA (ANTINUCLEAR ANTIBODY): ICD-10-CM

## 2023-05-05 DIAGNOSIS — O35.BXX0 ANOMALY OF HEART OF FETUS AFFECTING PREGNANCY, ANTEPARTUM, SINGLE OR UNSPECIFIED FETUS: ICD-10-CM

## 2023-05-05 DIAGNOSIS — R76.8 SS-A ANTIBODY POSITIVE: ICD-10-CM

## 2023-05-05 DIAGNOSIS — O09.899 HISTORY OF PRETERM DELIVERY, CURRENTLY PREGNANT: ICD-10-CM

## 2023-05-05 DIAGNOSIS — R76.8 SS-A ANTIBODY POSITIVE: Primary | ICD-10-CM

## 2023-05-05 PROCEDURE — 76825 ECHO EXAM OF FETAL HEART: CPT | Mod: 26 | Performed by: PEDIATRICS

## 2023-05-05 PROCEDURE — 99213 OFFICE O/P EST LOW 20 MIN: CPT | Mod: 25 | Performed by: PEDIATRICS

## 2023-05-05 PROCEDURE — 93325 DOPPLER ECHO COLOR FLOW MAPG: CPT

## 2023-05-05 PROCEDURE — 76825 ECHO EXAM OF FETAL HEART: CPT

## 2023-05-05 PROCEDURE — 93325 DOPPLER ECHO COLOR FLOW MAPG: CPT | Mod: 26 | Performed by: PEDIATRICS

## 2023-05-05 PROCEDURE — 76827 ECHO EXAM OF FETAL HEART: CPT | Mod: 26 | Performed by: PEDIATRICS

## 2023-05-05 NOTE — PROGRESS NOTES
[facility]   Heart Center Fetal Consult Note    Patient:  Earnestine Guillen MRN:  [id number]   YOB: 1980 Age:  42 year old   Date of Visit:  2023 PCP:  Rebecca Mckenna MD     Dear Doctor,     I had the pleasure of seeing Earnestine Guillen at the [facility] on 2023 in fetal cardiology consultation for fetal echocardiogram results. She presented today accompanied by her toddler. As you know, she is a 42 year old  at 22w1d who presented for follow-up fetal echocardiogram today because of Sjogren's Syndrome with positive SSA antibodies and pre-gestational type II diabetes mellitus. She was previously seen by Dr. Pond on 23 at which time she had a normal fetal echocardiogram with normal CO interval.    I performed and interpreted the fetal echocardiogram today, which demonstrated normal fetal cardiac anatomy. Normal fetal intracardiac connections. Normal right and left ventricular size and function. Fetal heart rate is regular at 135 bpm. Normal CO interval. No hydrops.      I reviewed the echo findings today with Earnestine Guillen. She is aware that the study was within normal limits with no major cardiac abnormalities. There is no evidence of myocardial inflammation and there is a normal CO interval. She is aware of the general limitations of fetal echocardiography.     Recommendations:  1. Follow-up fetal echocardiogram in 4 weeks- scheduled for 23  2. Post- EKG    Thank you for allowing me to participate in Earnestine's care. Please do not hesitate to contact me with questions or concerns.    This visit was separate from the performance and interpretation of the ultrasound. The majority of the time (>50%) was spent in counseling and coordination of care. I spent approximately 20 minutes in face-to-face time reviewing the above considerations.      Lucian Kaplan M.D.  , Pediatric Cardiology  [facility]  [facility]  [address], [address] Office Building 4th floor, [facility] [address]  Phone [phone]  Fax [phone]

## 2023-05-05 NOTE — LETTER
2023      RE: Earnestine Guillen  [address]  [facility] [address]     Dear Colleague,    Thank you for the opportunity to participate in the care of your patient, Earnestine Guillen, at the [facility] at [facility]. Please see a copy of my visit note below.    [facility]   Heart Center Fetal Consult Note    Patient:  Earnestine Guillen MRN:  [id number]   YOB: 1980 Age:  42 year old   Date of Visit:  2023 PCP:  Rebecca Mckenna MD     Dear Doctor,     I had the pleasure of seeing Earnestine Guillen at the [facility] on 2023 in fetal cardiology consultation for fetal echocardiogram results. She presented today accompanied by her toddler. As you know, she is a 42 year old  at 22w1d who presented for follow-up fetal echocardiogram today because of Sjogren's Syndrome with positive SSA antibodies and pre-gestational type II diabetes mellitus. She was previously seen by Dr. Pond on 23 at which time she had a normal fetal echocardiogram with normal ME interval.    I performed and interpreted the fetal echocardiogram today, which demonstrated normal fetal cardiac anatomy. Normal fetal intracardiac connections. Normal right and left ventricular size and function. Fetal heart rate is regular at 135 bpm. Normal ME interval. No hydrops.      I reviewed the echo findings today with Earnestine Guillen. She is aware that the study was within normal limits with no major cardiac abnormalities. There is no evidence of myocardial inflammation and there is a normal ME interval. She is aware of the general limitations of fetal echocardiography.     Recommendations:  1. Follow-up fetal echocardiogram in 4 weeks- scheduled for 23  2. Post- EKG    Thank you for allowing me to participate in Earnestine's care. Please do not hesitate to contact me with questions or  concerns.    This visit was separate from the performance and interpretation of the ultrasound. The majority of the time (>50%) was spent in counseling and coordination of care. I spent approximately 20 minutes in face-to-face time reviewing the above considerations.      Lucian Kaplan M.D.  , Pediatric Cardiology  [address] Office [address] 4th [facility], [place] [address]  Phone [phone]  Fax [phone]

## 2023-05-08 ENCOUNTER — OFFICE VISIT (OUTPATIENT)
Dept: MATERNAL FETAL MEDICINE | Facility: HOSPITAL | Age: 43
End: 2023-05-08
Attending: OBSTETRICS & GYNECOLOGY
Payer: COMMERCIAL

## 2023-05-08 ENCOUNTER — PRENATAL OFFICE VISIT (OUTPATIENT)
Dept: FAMILY MEDICINE | Facility: CLINIC | Age: 43
End: 2023-05-08
Payer: COMMERCIAL

## 2023-05-08 ENCOUNTER — ANCILLARY PROCEDURE (OUTPATIENT)
Dept: ULTRASOUND IMAGING | Facility: HOSPITAL | Age: 43
End: 2023-05-08
Attending: OBSTETRICS & GYNECOLOGY
Payer: COMMERCIAL

## 2023-05-08 VITALS
WEIGHT: 142.7 LBS | DIASTOLIC BLOOD PRESSURE: 72 MMHG | SYSTOLIC BLOOD PRESSURE: 128 MMHG | OXYGEN SATURATION: 97 % | BODY MASS INDEX: 27.64 KG/M2 | HEART RATE: 85 BPM

## 2023-05-08 DIAGNOSIS — K75.4 HEPATITIS, AUTOIMMUNE (H): ICD-10-CM

## 2023-05-08 DIAGNOSIS — R73.03 PREDIABETES: ICD-10-CM

## 2023-05-08 DIAGNOSIS — Z87.59 HISTORY OF PRETERM PREMATURE RUPTURE OF MEMBRANES (PPROM): ICD-10-CM

## 2023-05-08 DIAGNOSIS — N96 HISTORY OF RECURRENT MISCARRIAGES: ICD-10-CM

## 2023-05-08 DIAGNOSIS — Z86.32 HISTORY OF DIET CONTROLLED GESTATIONAL DIABETES MELLITUS (GDM): ICD-10-CM

## 2023-05-08 DIAGNOSIS — O09.892 HISTORY OF PRETERM DELIVERY, CURRENTLY PREGNANT IN SECOND TRIMESTER: Primary | ICD-10-CM

## 2023-05-08 DIAGNOSIS — M35.01 SJOGREN'S SYNDROME WITH KERATOCONJUNCTIVITIS SICCA (H): ICD-10-CM

## 2023-05-08 DIAGNOSIS — R76.8 SS-A ANTIBODY POSITIVE: ICD-10-CM

## 2023-05-08 DIAGNOSIS — R76.8 POSITIVE ANA (ANTINUCLEAR ANTIBODY): ICD-10-CM

## 2023-05-08 DIAGNOSIS — O09.529 SUPERVISION OF HIGH-RISK PREGNANCY OF ELDERLY MULTIGRAVIDA: Primary | ICD-10-CM

## 2023-05-08 DIAGNOSIS — O09.899 HISTORY OF PRETERM DELIVERY, CURRENTLY PREGNANT: ICD-10-CM

## 2023-05-08 PROCEDURE — 99207 PR NO CHARGE LOS: CPT | Performed by: OBSTETRICS & GYNECOLOGY

## 2023-05-08 PROCEDURE — 99207 PR COMPLICATED OB VISIT: CPT | Performed by: FAMILY MEDICINE

## 2023-05-08 PROCEDURE — 76817 TRANSVAGINAL US OBSTETRIC: CPT

## 2023-05-08 PROCEDURE — 76817 TRANSVAGINAL US OBSTETRIC: CPT | Mod: 26 | Performed by: OBSTETRICS & GYNECOLOGY

## 2023-05-08 NOTE — PROGRESS NOTES
Please see the imaging tab for details of the ultrasound performed today.    Elizabeth Christianson MD  Specialist in Maternal-Fetal Medicine

## 2023-05-08 NOTE — PROGRESS NOTES
"MICHELLE  22w3d    Estimated Date of Delivery: Sep 8, 2023    Wt Readings from Last 3 Encounters:   05/08/23 64.7 kg (142 lb 11.2 oz)   04/27/23 64 kg (141 lb)   04/03/23 63.4 kg (139 lb 12.8 oz)       Chief Complaint   Patient presents with     Prenatal Care     22 weeks        S: Doing well so far.  Fetal echo on 5/5 was reassuring. She just had her next cervical length ultrasound with MFM and we reviewed those reassuring results today.  She continues to monitor blood sugars and doing well with that regard, blood sugars are in range.    Reviewed goal to start baby ASA now that she is past 1st trimester-she was not sure on the dose so we will send that today    Her son Ike has picked the name for the new baby! \" Rodney\"    + fetal movement.   No vaginal bleeding, cramping, LOF.   No headaches, vision change, RUQ abdominal pain, LE swelling.       O: Vitals reviewed, see prenatal flowsheet for measurements.   appears well, no acute distress.  Normal respiratory effort.  Abdomen is soft. No LE swelling.     A/P:     Supervision of high-risk pregnancy of elderly multigravida  - MFM following for her hx of AMA, Sjogrens (+SSA/SSB), recurrent miscarriages, hx [id number] pregestational prediabetes 5.7% A1c @ 7wks this pregnancy, hx pre term birth. Will need cervical ultrasounds.  Her karyotype has been normal. Ike hasn't had his done yet - not sure if covered by insurance.   - MFM and ENDO notes since last visit reviewed in detail   - NIPT normal,   - It's a BOY!!  \"Rodney\", Clarisa for peds; plans to breastfeed  - Encouraged to start ASA 81mg - sent Rx     Hx recurrent pregnancy loss  No recent spotting. No evidence for CHERYL on dating US  - Has seen MFM for pre pregnancy consult in Nov. Updated labs 2/21/23 for LA, Cardiolipin, B2G, etc were reassuring/normal  - TSH/TPO done on 1/3/23 and both are negative/normal range  - maternal karyotype normal  - will need growth ultrasounds 28wk and 34wk     Hx of PPROM/spontaneous " late  birth  MFM Recommendations:               NOT recommended to proceed with Loretta this pregnancy     Early ultrasound to confirm KRISHNA. (done)    Optimize modifiable risk factors: smoking cessation, avoidance of cocaine, maintenance of adequate nutrition (done)    Baseline transvaginal cervical length at 16 weeks' gestation with measurements every 2 weeks through 23 weeks.       Ultrasound-indicated cerclage should be considered if shortening <25 mm is diagnosed <24 weeks.    Administration of  corticosteroids if the patient is deemed to be at increased risk of imminent  delivery.    Clinical evaluation of  labor symptoms.         + SSA/SSB antibodies/Sjogren's syndrome  Hx autoimmune hepatitis; resolved.   Symptoms stable; continue Rheumatologic meds (plaquenil, imuran). Understands risk for  lupus (rash and congenital heart block) The risk of developing heart block in offspring of women with anti-Ro/SSA antibodies in the absence of a prior birth with  lupus syndrome is approximately 1-2%.  It increases to 15-20% if a previous child was affected (baby Ike is not yet affected).    MFM Recommendations    Baseline creatinine, liver function tests, platelets and urine P/Cr ratio (ordered)     q trimester CMP  - Fetal Echos/SC intervals at 16, 20, 24 weeks with Pediatric Cardiology.  - SC interval with MFM at 18, 22 and 26 weeks.  - Baseline EKG (IO[facility] appt) was done; normal     Pregestational Prediabetes this pregnancy  History of diet controlled gestational diabetes mellitus (GDM)  A1c 5.7% @ 7wks visit; we proceeded with 1hr and 3hr GTTs which were very high.   - See full MFM consult; pt has been fully counseled through MFM and reiterated today with myself  all the risks to pregnacy regarding her complex medical conditions and she continues to desire prenatal care with me as her PCP who knows her conditions well. She understands risk for fetal demise or other  complications.   - Monitoring reviewed; BS are in range 100% at this time.   - Following with GDM educator, Endocrinology and MFM  - Continues daily 10K+ steps at work; encouraged movement after meals  - Already gets eye exams for her plaquenil so will add in conversation about prediabetes monitoring     MFM recs:  ? A1c, CMP, LFTs, CBC every trimester (planning to get at  appt, normal in March)   ? Urine cultures every trimester and aggressive treatment of bacteriuria  ? Baseline laboratories done at [facility] (A1c, CMP/LFTs, CBC, TSH Ucx, Pro:Cr ratio, EKG)     surveillance    MSAFP at 15-22 weeks to screen for open neural tube defects (declined))    Comprehensive ultrasound at 18-20 weeks    Fetal echocardiogram as per above    Serial ultrasounds for fetal growth monthly starting at 24 weeks gestation given co-morbidities present     fetal testing with twice weekly BPP at 32 weeks; if the patient is poorly controlled weekly testing should be initiated at 28 weeks and increased to twice weekly at 32 weeks     Delivery    If well-controlled, delivery at 39 weeks is recommended    For women with poorly controlled diabetes, a delivery before 39 weeks may be indicated, and we can help make recommendations as the pregnancy continues    Maternal glucose should be maintained at  mg/dL during delivery; this can be done with an insulin drip and requires hourly finger stick assessment in active labor       Follow up: 4 weeks with labs    Rebecca Mckenna MD      Next 5 appointments (look out 90 days)    2023  2:00 PM  (Arrive by 1:50 PM)  Return OB Visit with Rebecca Mckenna MD  [facility] ([facility] [address] [address] [phone]

## 2023-05-15 ENCOUNTER — VIRTUAL VISIT (OUTPATIENT)
Dept: EDUCATION SERVICES | Facility: CLINIC | Age: 43
End: 2023-05-15
Payer: COMMERCIAL

## 2023-05-15 DIAGNOSIS — O24.410 DIET CONTROLLED GESTATIONAL DIABETES MELLITUS (GDM) IN SECOND TRIMESTER: Primary | ICD-10-CM

## 2023-05-15 PROCEDURE — 98966 PH1 ASSMT&MGMT NQHP 5-10: CPT | Mod: 95

## 2023-05-15 NOTE — LETTER
5/15/2023         RE: Earnestine Guillen  [address] [facility] [address]        Dear Colleague,    Thank you for referring your patient, Earnestine Guillen, to the [facility]. Please see a copy of my visit note below.    Diabetes and Pregnancy Follow-up  Type of Service: Telephone Visit/ 7 minutes     Originating Location (Patient Location): Home  Distant Location (Provider Location): [facility]  Mode of Communication:  Telephone     Telephone Visit Start Time: 12:34 PM  Telephone Visit End Time (telephone visit stop time): 12:41 PM     How would patient like to obtain AVS? not needed    Subjective/Objective:    Earnestine Guillen was called for a scheduled BG review. Last date of communication was: 5/1/23.    Gestational diabetes is being managed with diet and activity    Taking diabetes medications: no    Estimated Date of Delivery: Sep 8, 2023    Blood Glucose/Ketone Log:    Date Ketones Fasting Post Breakfast Post Lunch Post Supper   5/1  93 123 108 140   5/2  80 120 118 130   5/3  92 130 121 117   5/4  94 125 115 137   5/5  85 118 127 134   5/6  91 140 137 115   5/7  93 115 138 119   5/8 negative 95 136 124 132   5/9  85 132 117 125   5/10  90 125 115 121   5/11  93 116 130 137   5/12  87 114 135 118   5/13  92 130 121 114   5/14  83 115 140 106   5/15 negative 91 104 109    (testing 1 hour post meals)    Assessment:  Ketones: negative x2.   Fasting blood glucoses: 100% in target.  After breakfast: 100% in target.  Before lunch: n/a% in target.  After lunch: 100% in target.  Before dinner: n/a% in target.  After dinner: 100% in target.    Patient with no questions/concerns today.  States she is satisfied with how she is eating. Encouraged her to continue her hard work.     Plan/Response:  No changes in the patient's current treatment plan.  Follow-up in 1 week.    Doris Paz, MPH, RD, CDCES, LD 5/15/2023    Time Spent: 7 minutes    Any diabetes medication dose changes were made  via the CDE Protocol and Collaborative Practice Agreement with the patient's referring provider. A copy of this encounter was shared with the provider.

## 2023-05-15 NOTE — LETTER
5/15/2023         RE: Earnestine Guillen  [address] [facility] [address]        Dear Colleague,    Thank you for referring your patient, Earnestine Guillen, to the [facility]. Please see a copy of my visit note below.    Diabetes and Pregnancy Follow-up  Type of Service: Telephone Visit/ 7 minutes     Originating Location (Patient Location): Home  Distant Location (Provider Location): [place] [facility]  Mode of Communication:  Telephone     Telephone Visit Start Time: 12:34 PM  Telephone Visit End Time (telephone visit stop time): 12:41 PM     How would patient like to obtain AVS? not needed    Subjective/Objective:    Earnestine Guillen was called for a scheduled BG review. Last date of communication was: 5/1/23.    Gestational diabetes is being managed with diet and activity    Taking diabetes medications: no    Estimated Date of Delivery: Sep 8, 2023    Blood Glucose/Ketone Log:    Date Ketones Fasting Post Breakfast Post Lunch Post Supper   5/1  93 123 108 140   5/2  80 120 118 130   5/3  92 130 121 117   5/4  94 125 115 137   5/5  85 118 127 134   5/6  91 140 137 115   5/7  93 115 138 119   5/8 negative 95 136 124 132   5/9  85 132 117 125   5/10  90 125 115 121   5/11  93 116 130 137   5/12  87 114 135 118   5/13  92 130 121 114   5/14  83 115 140 106   5/15 negative 91 104 109    (testing 1 hour post meals)    Assessment:  Ketones: negative x2.   Fasting blood glucoses: 100% in target.  After breakfast: 100% in target.  Before lunch: n/a% in target.  After lunch: 100% in target.  Before dinner: n/a% in target.  After dinner: 100% in target.    Patient with no questions/concerns today.  States she is satisfied with how she is eating. Encouraged her to continue her hard work.     Plan/Response:  No changes in the patient's current treatment plan.  Follow-up in 1 week.    Doris Paz, MPH, RD, CDCES, LD 5/15/2023    Time Spent: 7 minutes    Any diabetes medication dose changes were made  via the CDE Protocol and Collaborative Practice Agreement with the patient's referring provider. A copy of this encounter was shared with the provider.

## 2023-05-15 NOTE — PROGRESS NOTES
Diabetes and Pregnancy Follow-up  Type of Service: Telephone Visit/ 7 minutes     Originating Location (Patient Location): Home  Distant Location (Provider Location): [facility]  Mode of Communication:  Telephone     Telephone Visit Start Time: 12:34 PM  Telephone Visit End Time (telephone visit stop time): 12:41 PM     How would patient like to obtain AVS? not needed    Subjective/Objective:    Earnestine Guillen was called for a scheduled BG review. Last date of communication was: 5/1/23.    Gestational diabetes is being managed with diet and activity    Taking diabetes medications: no    Estimated Date of Delivery: Sep 8, 2023    Blood Glucose/Ketone Log:    Date Ketones Fasting Post Breakfast Post Lunch Post Supper   5/1  93 123 108 140   5/2  80 120 118 130   5/3  92 130 121 117   5/4  94 125 115 137   5/5  85 118 127 134   5/6  91 140 137 115   5/7  93 115 138 119   5/8 negative 95 136 124 132   5/9  85 132 117 125   5/10  90 125 115 121   5/11  93 116 130 137   5/12  87 114 135 118   5/13  92 130 121 114   5/14  83 115 140 106   5/15 negative 91 104 109    (testing 1 hour post meals)    Assessment:  Ketones: negative x2.   Fasting blood glucoses: 100% in target.  After breakfast: 100% in target.  Before lunch: n/a% in target.  After lunch: 100% in target.  Before dinner: n/a% in target.  After dinner: 100% in target.    Patient with no questions/concerns today.  States she is satisfied with how she is eating. Encouraged her to continue her hard work.     Plan/Response:  No changes in the patient's current treatment plan.  Follow-up in 1 week.    Doris Paz, MPH, RD, CDCES, LD 5/15/2023    Time Spent: 7 minutes    Any diabetes medication dose changes were made via the CDE Protocol and Collaborative Practice Agreement with the patient's referring provider. A copy of this encounter was shared with the provider.

## 2023-05-16 ENCOUNTER — TELEPHONE (OUTPATIENT)
Dept: FAMILY MEDICINE | Facility: CLINIC | Age: 43
End: 2023-05-16
Payer: COMMERCIAL

## 2023-05-16 NOTE — TELEPHONE ENCOUNTER
Form dropped off to be completed by Dr. Mckenna.    Please call patient at [phone] when done.    Routed to  Core.

## 2023-05-17 NOTE — TELEPHONE ENCOUNTER
05/17/23  Spoke with pt and let her know Dr. Mckenna completed the form and it is ready for  at .  Renetta

## 2023-05-22 ENCOUNTER — VIRTUAL VISIT (OUTPATIENT)
Dept: EDUCATION SERVICES | Facility: CLINIC | Age: 43
End: 2023-05-22
Payer: COMMERCIAL

## 2023-05-22 ENCOUNTER — TRANSFERRED RECORDS (OUTPATIENT)
Dept: HEALTH INFORMATION MANAGEMENT | Facility: CLINIC | Age: 43
End: 2023-05-22

## 2023-05-22 ENCOUNTER — OFFICE VISIT (OUTPATIENT)
Dept: MATERNAL FETAL MEDICINE | Facility: HOSPITAL | Age: 43
End: 2023-05-22
Attending: OBSTETRICS & GYNECOLOGY
Payer: COMMERCIAL

## 2023-05-22 ENCOUNTER — ANCILLARY PROCEDURE (OUTPATIENT)
Dept: ULTRASOUND IMAGING | Facility: HOSPITAL | Age: 43
End: 2023-05-22
Attending: OBSTETRICS & GYNECOLOGY
Payer: COMMERCIAL

## 2023-05-22 DIAGNOSIS — R76.8 SS-A ANTIBODY POSITIVE: Primary | ICD-10-CM

## 2023-05-22 DIAGNOSIS — O24.410 DIET CONTROLLED GESTATIONAL DIABETES MELLITUS (GDM) IN SECOND TRIMESTER: Primary | ICD-10-CM

## 2023-05-22 DIAGNOSIS — R76.8 SS-A ANTIBODY POSITIVE: ICD-10-CM

## 2023-05-22 DIAGNOSIS — O09.899 HISTORY OF PRETERM DELIVERY, CURRENTLY PREGNANT: ICD-10-CM

## 2023-05-22 DIAGNOSIS — O09.522 AMA (ADVANCED MATERNAL AGE) MULTIGRAVIDA 35+, SECOND TRIMESTER: ICD-10-CM

## 2023-05-22 DIAGNOSIS — R76.8 POSITIVE ANA (ANTINUCLEAR ANTIBODY): ICD-10-CM

## 2023-05-22 DIAGNOSIS — O24.912 DIABETES MELLITUS AFFECTING PREGNANCY IN SECOND TRIMESTER: ICD-10-CM

## 2023-05-22 PROCEDURE — 99207 PR NO BILLABLE SERVICE THIS VISIT: CPT

## 2023-05-22 PROCEDURE — 76828 ECHO EXAM OF FETAL HEART: CPT | Mod: 26 | Performed by: STUDENT IN AN ORGANIZED HEALTH CARE EDUCATION/TRAINING PROGRAM

## 2023-05-22 PROCEDURE — 76828 ECHO EXAM OF FETAL HEART: CPT

## 2023-05-22 PROCEDURE — 76826 ECHO EXAM OF FETAL HEART: CPT | Mod: 26 | Performed by: STUDENT IN AN ORGANIZED HEALTH CARE EDUCATION/TRAINING PROGRAM

## 2023-05-22 PROCEDURE — 76816 OB US FOLLOW-UP PER FETUS: CPT | Mod: 26 | Performed by: STUDENT IN AN ORGANIZED HEALTH CARE EDUCATION/TRAINING PROGRAM

## 2023-05-22 PROCEDURE — 99207 PR NO CHARGE LOS: CPT | Performed by: STUDENT IN AN ORGANIZED HEALTH CARE EDUCATION/TRAINING PROGRAM

## 2023-05-22 NOTE — LETTER
5/22/2023         RE: Earnestine Guillen  [address] [facility] [address]        Dear Colleague,    Thank you for referring your patient, Earnestine Guillen, to the [facility]. Please see a copy of my visit note below.    Diabetes and Pregnancy Follow-up  Type of Service: Telephone Visit/ 6 minutes     Originating Location (Patient Location): Home  Distant Location (Provider Location): [facility] [place] [place]  Mode of Communication:  Telephone     Telephone Visit Start Time: 12:31 PM  Telephone Visit End Time (telephone visit stop time): 12:37 PM     How would patient like to obtain AVS? not needed    Subjective/Objective:    Earnestine Guillen was called for a scheduled BG review. Last date of communication was: 5/15/23.    Gestational diabetes is being managed with diet and activity    Taking diabetes medications: no    Estimated Date of Delivery: Sep 8, 2023    Blood Glucose/Ketone Log:    Date Ketones Fasting Post Breakfast Post Lunch Post Supper   5/15  91 104 109 135   5/16  83 130 136 133   5/17  88 122 138 131   5/18  93 118 140 126   5/19  81 115 135 120   5/20  86 130 128 118   5/21  93 126 116 108   5/22 negative 90 116     (testing 1 hour post meals)    Assessment:  Ketones: negative x1.   Fasting blood glucoses: 100% in target.  After breakfast: 100% in target.  Before lunch: n/a% in target.  After lunch: 100% in target.  Before dinner: n/a% in target.  After dinner: 100% in target.    Patient with no questions/concerns. States she feels satisfied with how she is eating.  Discussed how insulin needs change during pregnancy.  Reviewed impact of physical activity on glucose control.     Plan/Response:  No changes in the patient's current treatment plan.  Follow-up in 2 weeks.    Doris Paz, MPH, RD, CDCES, LD 5/22/2023    Time Spent: 6 minutes    Any diabetes medication dose changes were made via the CDE Protocol and Collaborative Practice Agreement with the patient's referring  provider. A copy of this encounter was shared with the provider.

## 2023-05-22 NOTE — NURSING NOTE
Earnestine Guillen is a  at 24w3d who presents to [facility] for follow up growth ultrasound. Pt reports positive fetal movement. Pt denies bldg/lof/change in discharge, contractions, headache, vision changes, chest pain/SOB or edema. SBAR given to Dr. Aparicio, see note in Epic.

## 2023-05-22 NOTE — PROGRESS NOTES
Diabetes and Pregnancy Follow-up  Type of Service: Telephone Visit/ 6 minutes     Originating Location (Patient Location): Home  Distant Location (Provider Location): [facility]  Mode of Communication:  Telephone     Telephone Visit Start Time: 12:31 PM  Telephone Visit End Time (telephone visit stop time): 12:37 PM     How would patient like to obtain AVS? not needed    Subjective/Objective:    Earnestine Guillen was called for a scheduled BG review. Last date of communication was: 5/15/23.    Gestational diabetes is being managed with diet and activity    Taking diabetes medications: no    Estimated Date of Delivery: Sep 8, 2023    Blood Glucose/Ketone Log:    Date Ketones Fasting Post Breakfast Post Lunch Post Supper   5/15  91 104 109 135   5/16  83 130 136 133   5/17  88 122 138 131   5/18  93 118 140 126   5/19  81 115 135 120   5/20  86 130 128 118   5/21  93 126 116 108   5/22 negative 90 116     (testing 1 hour post meals)    Assessment:  Ketones: negative x1.   Fasting blood glucoses: 100% in target.  After breakfast: 100% in target.  Before lunch: n/a% in target.  After lunch: 100% in target.  Before dinner: n/a% in target.  After dinner: 100% in target.    Patient with no questions/concerns. States she feels satisfied with how she is eating.  Discussed how insulin needs change during pregnancy.  Reviewed impact of physical activity on glucose control.     Plan/Response:  No changes in the patient's current treatment plan.  Follow-up in 2 weeks.    Doris Paz, MPH, RD, CDCES, LD 5/22/2023    Time Spent: 6 minutes    Any diabetes medication dose changes were made via the CDE Protocol and Collaborative Practice Agreement with the patient's referring provider. A copy of this encounter was shared with the provider.

## 2023-05-31 ENCOUNTER — APPOINTMENT (OUTPATIENT)
Dept: NEUROLOGY | Facility: CLINIC | Age: 43
End: 2023-05-31

## 2023-06-02 ENCOUNTER — OFFICE VISIT (OUTPATIENT)
Dept: CARDIOLOGY | Facility: CLINIC | Age: 43
End: 2023-06-02
Payer: COMMERCIAL

## 2023-06-02 ENCOUNTER — HOSPITAL ENCOUNTER (OUTPATIENT)
Dept: CARDIOLOGY | Facility: CLINIC | Age: 43
Discharge: HOME | End: 2023-06-02
Attending: OBSTETRICS & GYNECOLOGY | Admitting: OBSTETRICS & GYNECOLOGY
Payer: COMMERCIAL

## 2023-06-02 DIAGNOSIS — R76.8 SS-A ANTIBODY POSITIVE: ICD-10-CM

## 2023-06-02 DIAGNOSIS — R76.8 POSITIVE ANA (ANTINUCLEAR ANTIBODY): ICD-10-CM

## 2023-06-02 DIAGNOSIS — O09.899 HISTORY OF PRETERM DELIVERY, CURRENTLY PREGNANT: ICD-10-CM

## 2023-06-02 DIAGNOSIS — R76.8 SS-A ANTIBODY POSITIVE: Primary | ICD-10-CM

## 2023-06-02 PROCEDURE — 76827 ECHO EXAM OF FETAL HEART: CPT | Mod: 26 | Performed by: PEDIATRICS

## 2023-06-02 PROCEDURE — 93325 DOPPLER ECHO COLOR FLOW MAPG: CPT

## 2023-06-02 PROCEDURE — 76825 ECHO EXAM OF FETAL HEART: CPT | Mod: 26 | Performed by: PEDIATRICS

## 2023-06-02 PROCEDURE — 93325 DOPPLER ECHO COLOR FLOW MAPG: CPT | Mod: 26 | Performed by: PEDIATRICS

## 2023-06-02 PROCEDURE — 99215 OFFICE O/P EST HI 40 MIN: CPT | Mod: 25 | Performed by: PEDIATRICS

## 2023-06-02 NOTE — PROGRESS NOTES
Fetal Cardiology Consultation    Patient:  Earnestine Guillen MRN:  [id number]   YOB: 1980 Age:  42 year old   Date of Visit:  2023 PCP:  Rebecca Mckenna MD   KRISHNA: 2023, by Last Menstrual Period EGA: 26w0d weeks     Dear Dr. Christianson:    I had the pleasure of seeing Earnestine Guillen at the [facility] in [place] on 2023 in consultation for fetal echocardiography results. She presented today accompanied by her young son. As you know, she is a 42 year old female with Sjogren's syndrome (SSA positivity) and pre-gestational diabetes mellitus.    The fetal echocardiogram was normal. Normal fetal cardiac anatomy. Normal right and left ventricular size and function without hypertrophy. No evidence of diastolic dysfunction. No pericardial effusion. No arrhythmia.     I reviewed and interpreted the fetal echocardiogram today. I discussed the normal results with Ms. Guillen. While these results are normal, it is important to note that fetal echocardiography cannot exclude small atrial or ventricular septal defects, persistent ductus arteriosus, mild coarctation of the aorta, partial anomalous pulmonary venous return, minor anatomic valve anomalies, or coronary artery anomalies.     -- A post-riaz 12-lead ECG is recommended.    Thank you for allowing me to participate in Ms. Guillen's care. Please don't hesitate to contact me or the Fetal Cardiology team at [facility] with any questions or concerns.     I spent a total of 40 minutes on the date of the encounter doing chart review, patient history, documentation, counseling, and coordinating care.    Zion Bender MD  Pediatric Cardiology  [facility]  Phone [phone]    Review of the result(s) of each unique test - fetal echocardiogram

## 2023-06-12 ENCOUNTER — PRENATAL OFFICE VISIT (OUTPATIENT)
Dept: FAMILY MEDICINE | Facility: CLINIC | Age: 43
End: 2023-06-12
Payer: COMMERCIAL

## 2023-06-12 VITALS
DIASTOLIC BLOOD PRESSURE: 76 MMHG | BODY MASS INDEX: 28.18 KG/M2 | SYSTOLIC BLOOD PRESSURE: 111 MMHG | OXYGEN SATURATION: 96 % | HEART RATE: 88 BPM | WEIGHT: 145.5 LBS | TEMPERATURE: 98.6 F

## 2023-06-12 DIAGNOSIS — R76.8 SS-A ANTIBODY POSITIVE: ICD-10-CM

## 2023-06-12 DIAGNOSIS — R06.2 WHEEZING: ICD-10-CM

## 2023-06-12 DIAGNOSIS — N96 HISTORY OF RECURRENT MISCARRIAGES: ICD-10-CM

## 2023-06-12 DIAGNOSIS — O09.529 SUPERVISION OF HIGH-RISK PREGNANCY OF ELDERLY MULTIGRAVIDA: Primary | ICD-10-CM

## 2023-06-12 DIAGNOSIS — R73.03 PREDIABETES: ICD-10-CM

## 2023-06-12 DIAGNOSIS — Z87.59 HISTORY OF PRETERM PREMATURE RUPTURE OF MEMBRANES (PPROM): ICD-10-CM

## 2023-06-12 DIAGNOSIS — Z86.32 HISTORY OF DIET CONTROLLED GESTATIONAL DIABETES MELLITUS (GDM): ICD-10-CM

## 2023-06-12 DIAGNOSIS — M35.01 SJOGREN'S SYNDROME WITH KERATOCONJUNCTIVITIS SICCA (H): ICD-10-CM

## 2023-06-12 PROBLEM — R79.89 ABNORMAL LFTS: Status: ACTIVE | Noted: 2021-07-10

## 2023-06-12 PROBLEM — K75.4 HEPATITIS, AUTOIMMUNE (H): Chronic | Status: ACTIVE | Noted: 2018-09-06

## 2023-06-12 PROBLEM — G43.109 MIGRAINE WITH AURA AND WITHOUT STATUS MIGRAINOSUS, NOT INTRACTABLE: Status: ACTIVE | Noted: 2018-07-23

## 2023-06-12 PROBLEM — M35.00 SJOGREN'S SYNDROME (H): Status: ACTIVE | Noted: 2019-09-30

## 2023-06-12 PROBLEM — Z12.4 CERVICAL CANCER SCREENING: Status: ACTIVE | Noted: 2017-07-21

## 2023-06-12 LAB
ALBUMIN SERPL BCG-MCNC: 3.3 G/DL (ref 3.5–5.2)
ALP SERPL-CCNC: 191 U/L (ref 35–104)
ALT SERPL W P-5'-P-CCNC: 68 U/L (ref 10–35)
ANION GAP SERPL CALCULATED.3IONS-SCNC: 12 MMOL/L (ref 7–15)
AST SERPL W P-5'-P-CCNC: 46 U/L (ref 10–35)
BILIRUB SERPL-MCNC: 0.4 MG/DL
BUN SERPL-MCNC: 9 MG/DL (ref 6–20)
CALCIUM SERPL-MCNC: 8.7 MG/DL (ref 8.6–10)
CHLORIDE SERPL-SCNC: 104 MMOL/L (ref 98–107)
CREAT SERPL-MCNC: 0.44 MG/DL (ref 0.51–0.95)
DEPRECATED HCO3 PLAS-SCNC: 20 MMOL/L (ref 22–29)
ERYTHROCYTE [DISTWIDTH] IN BLOOD BY AUTOMATED COUNT: 12.7 % (ref 10–15)
GFR SERPL CREATININE-BSD FRML MDRD: >90 ML/MIN/1.73M2
GLUCOSE SERPL-MCNC: 117 MG/DL (ref 70–99)
HBA1C MFR BLD: 5.8 % (ref 0–5.6)
HCT VFR BLD AUTO: 36.9 % (ref 35–47)
HGB BLD-MCNC: 12.5 G/DL (ref 11.7–15.7)
MCH RBC QN AUTO: 26.9 PG (ref 26.5–33)
MCHC RBC AUTO-ENTMCNC: 33.9 G/DL (ref 31.5–36.5)
MCV RBC AUTO: 80 FL (ref 78–100)
PLATELET # BLD AUTO: 225 10E3/UL (ref 150–450)
POTASSIUM SERPL-SCNC: 3.4 MMOL/L (ref 3.4–5.3)
PROT SERPL-MCNC: 7.2 G/DL (ref 6.4–8.3)
RBC # BLD AUTO: 4.64 10E6/UL (ref 3.8–5.2)
SODIUM SERPL-SCNC: 136 MMOL/L (ref 136–145)
WBC # BLD AUTO: 5.8 10E3/UL (ref 4–11)

## 2023-06-12 PROCEDURE — 85027 COMPLETE CBC AUTOMATED: CPT | Performed by: FAMILY MEDICINE

## 2023-06-12 PROCEDURE — 80053 COMPREHEN METABOLIC PANEL: CPT | Performed by: FAMILY MEDICINE

## 2023-06-12 PROCEDURE — 87086 URINE CULTURE/COLONY COUNT: CPT | Performed by: FAMILY MEDICINE

## 2023-06-12 PROCEDURE — 99214 OFFICE O/P EST MOD 30 MIN: CPT | Performed by: FAMILY MEDICINE

## 2023-06-12 PROCEDURE — 83036 HEMOGLOBIN GLYCOSYLATED A1C: CPT | Performed by: FAMILY MEDICINE

## 2023-06-12 PROCEDURE — 36415 COLL VENOUS BLD VENIPUNCTURE: CPT | Performed by: FAMILY MEDICINE

## 2023-06-12 PROCEDURE — 99207 PR COMPLICATED OB VISIT: CPT | Performed by: FAMILY MEDICINE

## 2023-06-12 RX ORDER — PREDNISONE 10 MG/1
TABLET ORAL
Status: ON HOLD | COMMUNITY
Start: 2023-06-04 | End: 2023-08-23

## 2023-06-12 RX ORDER — INHALER, ASSIST DEVICES
SPACER (EA) MISCELLANEOUS
COMMUNITY
Start: 2023-06-04

## 2023-06-12 RX ORDER — ALBUTEROL SULFATE 90 UG/1
2 AEROSOL, METERED RESPIRATORY (INHALATION)
Status: ON HOLD | COMMUNITY
Start: 2023-06-04 | End: 2023-08-23

## 2023-06-12 RX ORDER — ALBUTEROL SULFATE 90 UG/1
AEROSOL, METERED RESPIRATORY (INHALATION)
Status: ON HOLD | COMMUNITY
Start: 2023-06-04 | End: 2023-08-23

## 2023-06-12 NOTE — PROGRESS NOTES
MICHELLE  27w3d  Estimated Date of Delivery: Sep 8, 2023    Wt Readings from Last 3 Encounters:   23 66 kg (145 lb 8 oz)   23 64.7 kg (142 lb 11.2 oz)   23 64 kg (141 lb)       Chief Complaint   Patient presents with     Prenatal Care     27w3d       S: She went to urgent care two weekends in a row on  (given augmentin which didn't help) and ;  for cough x3 weeks as of that point- had an x-ray performed on  showing mild peribronchial thickening which can represent bronchitis or reactive airway disease.  No pleural effusions or pneumothorax.  She was given albuterol inhaler, prednisone for 5 days which really helped.     She is still using the albuterol about 2x/day (AM/PM). It does help. Hx of asthma like issues in her last pregnancy then needing albuterol too. Still somewhat wheezing at night. Declines wanting a daily maintence inhaler (her son Ike has severe asthma and very familiar with asthma action plans) and would like to monitor this longer before adding more medication as she does feel she is improving.    She continues to monitor blood sugars and doing well with that regard, blood sugars are in range outside of the prednisone use. She had to cancel her lst Endo appt due to her coughing she could hardly talk to have a conversation. Surprisingly only 2 sugars were out of range (143 was the highest) during her prednisone burst.       She needs another work restriction form which we submitted recently; and has [place] papers soon  Plans to stop working  (last day of work is )    EFW 73rd percentile on 2023 ultrasound with normal GA interval @ 24wks.    Plan to have growth ultrasounds every 4 weeks until the end of the pregnancy and twice weekly  testing starting at 32 weeks given her pregestational diabetes mellitus.    [place]     Her last fetal echocardiogram was reassuring.    She has been taking daily ASA 81mg    +  "fetal movement.   No vaginal bleeding, cramping, LOF.   No headaches, vision change, RUQ abdominal pain, LE swelling.       O: Vitals reviewed, see prenatal flowsheet for measurements.   appears well, no acute distress.  Normal respiratory effort.  Abdomen is soft. No LE swelling.     A/P:     Supervision of high-risk pregnancy of elderly multigravida  - MFM following for her hx of AMA, Sjogrens (+SSA/SSB), recurrent miscarriages, hx [id number] pregestational prediabetes 5.7% A1c @ 7wks this pregnancy, hx pre term birth. Will need cervical ultrasounds.  Her karyotype has been normal. Ike hasn't had his done yet - not sure if covered by insurance.   - MFM and ENDO notes since last visit reviewed in detail   - NIPT normal,   - It's a BOY!!  \"Rodney\", Clarisa for peds; plans to breastfeed  - Continues ASA 81mg      Hx recurrent pregnancy loss  No recent spotting. No evidence for CHERYL on dating US  - Has seen MFM for pre pregnancy consult in Nov. Updated labs 23 for LA, Cardiolipin, B2G, etc were reassuring/normal  - TSH/TPO done on 1/3/23 and both are negative/normal range  - maternal karyotype normal  - will need growth ultrasounds 28wk and 34wk     Hx of PPROM/spontaneous late  birth  MFM Recommendations:               NOT recommended to proceed with [place] this pregnancy     Early ultrasound to confirm KRISHNA. (done)    Optimize modifiable risk factors: smoking cessation, avoidance of cocaine, maintenance of adequate nutrition (done)    Baseline transvaginal cervical length at 16 weeks' gestation with measurements every 2 weeks through 23 weeks. (done, normal; cerclage not needed)       corticosteroids if the patient is deemed to be at increased risk of imminent  delivery.    Clinical evaluation of  labor symptoms.         + SSA/SSB antibodies/Sjogren's syndrome  Hx autoimmune hepatitis; resolved.   Symptoms stable; continue Rheumatologic meds (plaquenil, imuran). Understands risk for "  lupus (rash and congenital heart block) The risk of developing heart block in offspring of women with anti-Ro/SSA antibodies in the absence of a prior birth with  lupus syndrome is approximately 1-2%.  It increases to 15-20% if a previous child was affected (baby Ike is not yet affected).    MFM Recommendations    Baseline creatinine, liver function tests, platelets and urine P/Cr ratio (done)     q trimester CMP  - Fetal Echos/NM intervals at 16, 20, 24 weeks  (done!)  - NM interval with MFM at 18, 22 and 26 weeks.  (done!)  - Baseline EKG ([facility] appt) was done; normal  ---> fetal EKG needed postnatally     Pregestational Prediabetes this pregnancy  History of diet controlled gestational diabetes mellitus (GDM)  A1c 5.7% @ 7wks visit; we proceeded with 1hr and 3hr GTTs which were very high.   - See full MFM consult; pt has been fully counseled through MFM and reiterated today with myself  all the risks to pregnacy regarding her complex medical conditions and she continues to desire prenatal care with me as her PCP who knows her conditions well. She understands risk for fetal demise or other complications.   - Monitoring reviewed; BS are in range 100% at this time (2 mildly elevated sugars during steroid burst)   - Following with GDM educator, Endocrinology and MFM  - Continues daily 10K+ steps at work; encouraged movement after meals  - Already gets eye exams for her plaquenil so will add in conversation about prediabetes monitoring     MF recs:  ? A1c, CMP, LFTs, CBC every trimester ( getting today at  appt, normal in March)   ? Urine cultures every trimester and aggressive treatment of bacteriuria ( next)  ? Baseline laboratories done at [facility] (A1c, CMP/LFTs, CBC, TSH Ucx, Pro:Cr ratio, EKG)     surveillance    Declined MSAFP at 15-22 weeks to screen for open neural tube defects     Comprehensive ultrasound at 18-20 weeks (normal)    Fetal echocardiogram as per above    Serial  ultrasounds for fetal growth monthly starting at 24 weeks gestation given co-morbidities present     fetal testing with twice weekly BPP at 32 weeks; if the patient is poorly controlled weekly testing should be initiated at 28 weeks and increased to twice weekly at 32 weeks     Ordered weekly BPPs (at [facility]) to start @ 32 weeks with NST later in week at our clinic most likely     Delivery    If well-controlled, delivery at 39 weeks is recommended    For women with poorly controlled diabetes, a delivery before 39 weeks may be indicated, and we can help make recommendations as the pregnancy continues    Maternal glucose should be maintained at  mg/dL during delivery; this can be done with an insulin drip and requires hourly finger stick assessment in active labor     Wheezing; strong famhx of asthma; similar sx last pregnancy; declines daily asthma inhaler- continues prn albuterol with recent viral URI      Follow up: 2 weeks  Rebecca Mckenna MD      Next 5 appointments (look out 90 days)    2023  1:30 PM  (Arrive by 1:20 PM)  Return OB Visit with Rebecca Mckenna MD  [facility] ([facility] ) [address] [place] [id number]  [phone]   2023  1:30 PM  (Arrive by 1:20 PM)  Return OB Visit with Rebecca Mckenna MD  [facility] ([facility] ) [address] [place] [address]  [phone]   Aug 14, 2023  2:00 PM  (Arrive by 1:50 PM)  Return OB Visit with Rebecca Mckenna MD  [facility] ([facility] ) [address] [place] [address]  [phone]   Aug 21, 2023  2:00 PM  (Arrive by 1:50 PM)  Return OB Visit with Rebecca Mckenna MD  [facility] ([facility] ) [address] [place] [address]  [phone]

## 2023-06-14 LAB — BACTERIA UR CULT: NO GROWTH

## 2023-06-15 ENCOUNTER — TELEPHONE (OUTPATIENT)
Dept: FAMILY MEDICINE | Facility: CLINIC | Age: 43
End: 2023-06-15
Payer: COMMERCIAL

## 2023-06-15 NOTE — TELEPHONE ENCOUNTER
06/15/23  Forms/Letter Request    Type of form/letter: Work    Have you been seen for this request: N/A    Do we have the form/letter: Yes: in [place] folder    When is form/letter needed by: does not say    How would you like the form/letter returned: Fax - [phone]

## 2023-06-19 ENCOUNTER — OFFICE VISIT (OUTPATIENT)
Dept: MATERNAL FETAL MEDICINE | Facility: HOSPITAL | Age: 43
End: 2023-06-19
Attending: STUDENT IN AN ORGANIZED HEALTH CARE EDUCATION/TRAINING PROGRAM
Payer: COMMERCIAL

## 2023-06-19 ENCOUNTER — ANCILLARY PROCEDURE (OUTPATIENT)
Dept: ULTRASOUND IMAGING | Facility: HOSPITAL | Age: 43
End: 2023-06-19
Attending: STUDENT IN AN ORGANIZED HEALTH CARE EDUCATION/TRAINING PROGRAM
Payer: COMMERCIAL

## 2023-06-19 DIAGNOSIS — O09.899 HISTORY OF PRETERM DELIVERY, CURRENTLY PREGNANT: ICD-10-CM

## 2023-06-19 DIAGNOSIS — R76.8 SS-A ANTIBODY POSITIVE: ICD-10-CM

## 2023-06-19 DIAGNOSIS — O09.522 AMA (ADVANCED MATERNAL AGE) MULTIGRAVIDA 35+, SECOND TRIMESTER: ICD-10-CM

## 2023-06-19 DIAGNOSIS — O09.522 AMA (ADVANCED MATERNAL AGE) MULTIGRAVIDA 35+, SECOND TRIMESTER: Primary | ICD-10-CM

## 2023-06-19 DIAGNOSIS — O24.912 DIABETES MELLITUS AFFECTING PREGNANCY IN SECOND TRIMESTER: ICD-10-CM

## 2023-06-19 PROCEDURE — 76816 OB US FOLLOW-UP PER FETUS: CPT | Mod: 26 | Performed by: STUDENT IN AN ORGANIZED HEALTH CARE EDUCATION/TRAINING PROGRAM

## 2023-06-19 PROCEDURE — 76816 OB US FOLLOW-UP PER FETUS: CPT

## 2023-06-19 PROCEDURE — 99207 PR NO CHARGE LOS: CPT | Performed by: STUDENT IN AN ORGANIZED HEALTH CARE EDUCATION/TRAINING PROGRAM

## 2023-06-19 NOTE — NURSING NOTE
Earnestine Guillen is a  at 28w3d who presents to [facility] for follow up growth ultrasound. Pt reports positive fetal movement. Pt denies bldg/lof/change in discharge, contractions, headache, vision changes, chest pain/SOB or edema. SBAR given to Dr. Aparicio, see note in Epic.

## 2023-06-21 ENCOUNTER — TELEPHONE (OUTPATIENT)
Dept: FAMILY MEDICINE | Facility: CLINIC | Age: 43
End: 2023-06-21

## 2023-06-21 NOTE — TELEPHONE ENCOUNTER
Form dropped off to be completed by Dr. Mckenna.    Please fax to [phone] when done.    Routed to  Core.

## 2023-06-26 ENCOUNTER — PRENATAL OFFICE VISIT (OUTPATIENT)
Dept: FAMILY MEDICINE | Facility: CLINIC | Age: 43
End: 2023-06-26
Payer: COMMERCIAL

## 2023-06-26 VITALS
SYSTOLIC BLOOD PRESSURE: 112 MMHG | DIASTOLIC BLOOD PRESSURE: 70 MMHG | HEART RATE: 54 BPM | OXYGEN SATURATION: 99 % | BODY MASS INDEX: 28.17 KG/M2 | WEIGHT: 145.44 LBS

## 2023-06-26 DIAGNOSIS — R76.8 SS-A ANTIBODY POSITIVE: ICD-10-CM

## 2023-06-26 DIAGNOSIS — Z87.59 HISTORY OF PRETERM PREMATURE RUPTURE OF MEMBRANES (PPROM): ICD-10-CM

## 2023-06-26 DIAGNOSIS — Z86.32 HISTORY OF DIET CONTROLLED GESTATIONAL DIABETES MELLITUS (GDM): ICD-10-CM

## 2023-06-26 DIAGNOSIS — R76.8 POSITIVE ANA (ANTINUCLEAR ANTIBODY): ICD-10-CM

## 2023-06-26 DIAGNOSIS — R73.03 PREDIABETES: ICD-10-CM

## 2023-06-26 DIAGNOSIS — O09.529 SUPERVISION OF HIGH-RISK PREGNANCY OF ELDERLY MULTIGRAVIDA: Primary | ICD-10-CM

## 2023-06-26 DIAGNOSIS — K75.4 HEPATITIS, AUTOIMMUNE (H): ICD-10-CM

## 2023-06-26 DIAGNOSIS — M35.01 SJOGREN'S SYNDROME WITH KERATOCONJUNCTIVITIS SICCA (H): ICD-10-CM

## 2023-06-26 LAB
ALBUMIN SERPL BCG-MCNC: 3.5 G/DL (ref 3.5–5.2)
ALP SERPL-CCNC: 236 U/L (ref 35–104)
ALT SERPL W P-5'-P-CCNC: 56 U/L (ref 0–50)
AST SERPL W P-5'-P-CCNC: 51 U/L (ref 0–45)
BILIRUB DIRECT SERPL-MCNC: <0.2 MG/DL (ref 0–0.3)
BILIRUB SERPL-MCNC: 0.4 MG/DL
PROT SERPL-MCNC: 7.6 G/DL (ref 6.4–8.3)

## 2023-06-26 PROCEDURE — 99207 PR COMPLICATED OB VISIT: CPT | Performed by: FAMILY MEDICINE

## 2023-06-26 PROCEDURE — 80076 HEPATIC FUNCTION PANEL: CPT | Performed by: FAMILY MEDICINE

## 2023-06-26 PROCEDURE — 36415 COLL VENOUS BLD VENIPUNCTURE: CPT | Performed by: FAMILY MEDICINE

## 2023-06-26 NOTE — PATIENT INSTRUCTIONS
You can call the radiology department at [phone] to schedule your imaging test (biophysical) that was ordered.    We should do the BPPs (biophysical ultrasounds) weekly from 32 weeks on.

## 2023-06-26 NOTE — PROGRESS NOTES
"MICHELLE  29w3d    Estimated Date of Delivery: Sep 8, 2023    Wt Readings from Last 3 Encounters:   23 66 kg (145 lb 7 oz)   23 66 kg (145 lb 8 oz)   23 64.7 kg (142 lb 11.2 oz)       Chief Complaint   Patient presents with     Prenatal Care     No concerns        S: She is feeling recovered from the URI since our last visit. Not needing the albuterol anymore.     Blood sugars are still stable; within expected range.     She did call her GI team and they are working to get her in for an appt given the mildly elevated LFTs  On  (steroids, amoxicillin at that time for URI illness)    Plans to stop working  (last day of work is ). We are sending in her next FMLA form today.     EFW 73rd percentile on 2023 ultrasound with normal KS interval @ 24wks.    Plan to have growth ultrasounds every 4 weeks until the end of the pregnancy and twice weekly  testing starting at 32 weeks given her pregestational diabetes mellitus.    [place]     She has been taking daily ASA 81mg    + fetal movement.   No vaginal bleeding, cramping, LOF.   No headaches, vision change, RUQ abdominal pain, LE swelling.       O: Vitals reviewed, see prenatal flowsheet for measurements.   appears well, no acute distress.  Normal respiratory effort.  Abdomen is soft. No LE swelling.     A/P:     Supervision of high-risk pregnancy of elderly multigravida  - MFM following for her hx of AMA, Sjogrens (+SSA/SSB), recurrent miscarriages, hx [id number] pregestational prediabetes 5.7% A1c @ 7wks this pregnancy, hx pre term birth. Will need cervical ultrasounds.  Her karyotype has been normal. Ike hasn't had his done yet - not sure if covered by insurance.   - MFM and ENDO notes since last visit reviewed in detail   - NIPT normal,   - It's a BOY!!  \"Rodney\", Clarisa for peds; plans to breastfeed,  EKG  - Continues ASA 81mg      Hx recurrent pregnancy loss  No recent " spotting. No evidence for CHERYL on dating US  - Has seen MFM for pre pregnancy consult in Nov. Updated labs 23 for LA, Cardiolipin, B2G, etc were reassuring/normal  - TSH/TPO done on 1/3/23 and both are negative/normal range  - maternal karyotype normal  - will need growth ultrasounds 28wk (done) and 34wk     Hx of PPROM/spontaneous late  birth  MFM Recommendations:               NOT recommended to proceed with [place] this pregnancy     Early ultrasound to confirm KRISHNA. (done)    Optimize modifiable risk factors: smoking cessation, avoidance of cocaine, maintenance of adequate nutrition (done)    Baseline transvaginal cervical length at 16 weeks' gestation with measurements every 2 weeks through 23 weeks. (done, normal; cerclage not needed)       corticosteroids if the patient is deemed to be at increased risk of imminent  delivery.    Clinical evaluation of  labor symptoms.         + SSA/SSB antibodies/Sjogren's syndrome  Hx autoimmune hepatitis; resolved.   Symptoms stable; continue Rheumatologic meds (plaquenil, imuran). Understands risk for  lupus (rash and congenital heart block) The risk of developing heart block in offspring of women with anti-Ro/SSA antibodies in the absence of a prior birth with  lupus syndrome is approximately 1-2%.  It increases to 15-20% if a previous child was affected (baby Ike is not yet affected).    MFM Recommendations    Baseline creatinine, liver function tests, platelets and urine P/Cr ratio (done)     q trimester CMP  - Fetal Echos/MT intervals at 16, 20, 24 weeks  (done!)  - MT interval with MFM at 18, 22 and 26 weeks.  (done!)  - Baseline EKG ([facility] appt) was done; normal  ---> fetal EKG needed postnatally     Pregestational Prediabetes this pregnancy  History of diet controlled gestational diabetes mellitus (GDM)  A1c 5.7% @ 7wks visit; we proceeded with 1hr and 3hr GTTs which were very high.   - See full MFM consult; pt has  been fully counseled through MF and reiterated today with myself  all the risks to pregnacy regarding her complex medical conditions and she continues to desire prenatal care with me as her PCP who knows her conditions well. She understands risk for fetal demise or other complications.   - Monitoring reviewed; BS are in range 100% at this time (2 mildly elevated sugars during steroid burst)   - Following with GDM educator, Endocrinology and MFM  - Continues daily 10K+ steps at work; encouraged movement after meals  - Already gets eye exams for her plaquenil so will add in conversation about prediabetes monitoring     MF recs:  ? A1c, CMP, LFTs, CBC every trimester ( getting today at  appt, normal in March)   ? Urine cultures every trimester (done last at 3rd trimester 28wks on ) and aggressive treatment of bacteriuria (July/Aug next)  ? Baseline laboratories done at [facility] (A1c, CMP/LFTs, CBC, TSH Ucx, Pro:Cr ratio, EKG)     surveillance    Declined MSAFP at 15-22 weeks to screen for open neural tube defects     Comprehensive ultrasound at 18-20 weeks (normal)    Fetal echocardiogram as per above (normal)    Serial ultrasounds for fetal growth monthly starting at 24 weeks gestation given co-morbidities present     fetal testing with twice weekly BPP at 32 weeks; if the patient is poorly controlled weekly testing should be initiated at 28 weeks and increased to twice weekly at 32 weeks     Ordered weekly BPPs (at [facility]) to start @ 32 weeks with NST later in week at our clinic most likely   she will call today to schedule these    Delivery    If well-controlled, delivery at 39 weeks is recommended    For women with poorly controlled diabetes, a delivery before 39 weeks may be indicated, and we can help make recommendations as the pregnancy continues    Maternal glucose should be maintained at  mg/dL during delivery; this can be done with an insulin drip and requires hourly finger stick  assessment in active labor       Follow up: 2 weeks  Rebecca Mckenna MD      Next 5 appointments (look out 90 days)    Jun 26, 2023  1:30 PM  (Arrive by 1:20 PM)  Return OB Visit with Rebecca Mckenna MD  [facility] ([facility] ) [address] [address]  [phone]   Jul 31, 2023  1:30 PM  (Arrive by 1:20 PM)  Return OB Visit with Rebecca Mckenna MD  [facility] ([facility] ) [address] [place] [phone]  [phone]   Aug 14, 2023  2:00 PM  (Arrive by 1:50 PM)  Return OB Visit with Rebecca Mckenna MD  [facility] ([facility] ) [address] [place] [phone]  [phone]   Aug 21, 2023  2:00 PM  (Arrive by 1:50 PM)  Return OB Visit with Rebecca Mckenna MD  [facility] ([facility] ) [address] [place] [phone]  [phone]

## 2023-06-27 ENCOUNTER — TELEPHONE (OUTPATIENT)
Dept: FAMILY MEDICINE | Facility: CLINIC | Age: 43
End: 2023-06-27
Payer: COMMERCIAL

## 2023-06-27 NOTE — TELEPHONE ENCOUNTER
Forms/Letter Request    Type of form/letter: FMLA - Unknown    Have you been seen for this request: Yes     Do we have the form/letter: Yes: Dropped off at  on 06/21/23  Who is the form from? Patient    Where did/will the form come from? Patient or family brought in       When is form/letter needed by: ASAP    How would you like the form/letter returned:  and Fax : FAX NUMBER AT BOTTOM OF FORM    Patient Notified form requests are processed in 3-5 business days:Yes    Could we send this information to you in [employer] or would you prefer to receive a phone call?:   Patient would prefer a phone call   Okay to leave a detailed message?: Yes at Home number on file [phone] (home)

## 2023-06-28 NOTE — TELEPHONE ENCOUNTER
Form was faxed 6/27 and also placed a copy at the  to  6/28. Left message for patient with this info.  Hermelinda Mitchell LPN

## 2023-07-07 ENCOUNTER — APPOINTMENT (OUTPATIENT)
Dept: DERMATOLOGY | Facility: CLINIC | Age: 43
End: 2023-07-07
Payer: MEDICAID

## 2023-07-07 PROCEDURE — 99204 OFFICE O/P NEW MOD 45 MIN: CPT

## 2023-07-10 ENCOUNTER — PRENATAL OFFICE VISIT (OUTPATIENT)
Dept: FAMILY MEDICINE | Facility: CLINIC | Age: 43
End: 2023-07-10
Payer: COMMERCIAL

## 2023-07-10 VITALS
SYSTOLIC BLOOD PRESSURE: 102 MMHG | DIASTOLIC BLOOD PRESSURE: 72 MMHG | OXYGEN SATURATION: 97 % | WEIGHT: 146.2 LBS | BODY MASS INDEX: 28.32 KG/M2 | HEART RATE: 78 BPM

## 2023-07-10 DIAGNOSIS — K75.4 HEPATITIS, AUTOIMMUNE (H): ICD-10-CM

## 2023-07-10 DIAGNOSIS — O09.529 SUPERVISION OF HIGH-RISK PREGNANCY OF ELDERLY MULTIGRAVIDA: Primary | ICD-10-CM

## 2023-07-10 DIAGNOSIS — Z86.32 HISTORY OF DIET CONTROLLED GESTATIONAL DIABETES MELLITUS (GDM): ICD-10-CM

## 2023-07-10 DIAGNOSIS — R73.03 PREDIABETES: ICD-10-CM

## 2023-07-10 DIAGNOSIS — N96 HISTORY OF RECURRENT MISCARRIAGES: ICD-10-CM

## 2023-07-10 DIAGNOSIS — R76.8 SS-A ANTIBODY POSITIVE: ICD-10-CM

## 2023-07-10 DIAGNOSIS — M35.01 SJOGREN'S SYNDROME WITH KERATOCONJUNCTIVITIS SICCA (H): ICD-10-CM

## 2023-07-10 DIAGNOSIS — Z87.59 HISTORY OF PRETERM PREMATURE RUPTURE OF MEMBRANES (PPROM): ICD-10-CM

## 2023-07-10 PROCEDURE — 99207 PR COMPLICATED OB VISIT: CPT | Performed by: FAMILY MEDICINE

## 2023-07-10 NOTE — PATIENT INSTRUCTIONS
You can call the radiology department at [phone] to schedule your imaging test that was ordered.      Ideally, the BPP is changed from 7/18 at [facility] to Thursdays weekly.

## 2023-07-10 NOTE — PROGRESS NOTES
"MICHELLE  31w3d    Estimated Date of Delivery: Sep 8, 2023    Wt Readings from Last 3 Encounters:   07/10/23 66.3 kg (146 lb 3.2 oz)   23 66 kg (145 lb 7 oz)   23 66 kg (145 lb 8 oz)       Chief Complaint   Patient presents with     Prenatal Care     32 weeks       S: She is feeling well   She has rare dawna sandra about a day (usually it occurs at night). No LOF    She has a GI appt for  to evaluate her mildly elevated LFTs-  they are working to get her in for an appt given the mildly elevated LFTs  On  (steroids, amoxicillin at that time for URI illness); similar results on  and declines labs today    Blood sugars are still stable; within expected range.     EFW 73rd percentile on 2023 ultrasound with normal [place] interval @ 24wks.    Plan to have growth ultrasounds every 4 weeks until the end of the pregnancy and twice weekly  testing starting at 32 weeks given her pregestational diabetes mellitus.    [place]     She has been taking daily ASA 81mg    + fetal movement.   No vaginal bleeding, cramping, LOF.   No headaches, vision change, RUQ abdominal pain, LE swelling.       O: Vitals reviewed, see prenatal flowsheet for measurements.   appears well, no acute distress.  Normal respiratory effort.  Abdomen is soft. No LE swelling.     A/P:     Supervision of high-risk pregnancy of elderly multigravida  - MFM following for her hx of AMA, Sjogrens (+SSA/SSB), recurrent miscarriages, hx [id number] pregestational prediabetes 5.7% A1c @ 7wks this pregnancy, hx pre term birth. Will need cervical ultrasounds.  Her karyotype has been normal. Ike hasn't had his done yet - not sure if covered by insurance.   - MFM and ENDO notes since last visit reviewed in detail   - NIPT normal,   - It's a BOY!!  \"Rodney\", Clarisa for peds; plans to breastfeed,  EKG  - Continues ASA 81mg      Hx recurrent pregnancy loss  No recent spotting. No evidence for " CHERYL on dating US  - Has seen [facility] for pre pregnancy consult in Nov. Updated labs 23 for LA, Cardiolipin, B2G, etc were reassuring/normal  - TSH/TPO done on 1/3/23 and both are negative/normal range  - maternal karyotype normal  - will need growth ultrasounds 28wk (done) and 34wk ()     Hx of PPROM/spontaneous late  birth  MFM Recommendations:               NOT recommended to proceed with [place] this pregnancy     Early ultrasound to confirm KRISHNA. (done)    Optimize modifiable risk factors: smoking cessation, avoidance of cocaine, maintenance of adequate nutrition (done)    Baseline transvaginal cervical length at 16 weeks' gestation with measurements every 2 weeks through 23 weeks. (done, normal; cerclage not needed)       corticosteroids if the patient is deemed to be at increased risk of imminent  delivery.    Clinical evaluation of  labor symptoms.         + SSA/SSB antibodies/Sjogren's syndrome  Hx autoimmune hepatitis; resolved.   Symptoms stable; continue Rheumatologic meds (plaquenil, imuran). Understands risk for  lupus (rash and congenital heart block) The risk of developing heart block in offspring of women with anti-Ro/SSA antibodies in the absence of a prior birth with  lupus syndrome is approximately 1-2%.  It increases to 15-20% if a previous child was affected (baby Ike is not yet affected).    [facility] Recommendations    Baseline creatinine, liver function tests, platelets and urine P/Cr ratio (done)     q trimester CMP (done in  last; will recheck @ 34-36wks)  - Fetal Echos/MS intervals at 16, 20, 24 weeks  (done!)  - MS interval with MFM at 18, 22 and 26 weeks.  (done!)  - Baseline EKG ([facility] appt) was done; normal  ---> fetal EKG needed postnatally     Pregestational Prediabetes this pregnancy  History of diet controlled gestational diabetes mellitus (GDM)  A1c 5.7% @ 7wks visit; we proceeded with 1hr and 3hr GTTs which were very high.   - See  full MFM consult; pt has been fully counseled through MFM and reiterated today with myself  all the risks to pregnacy regarding her complex medical conditions and she continues to desire prenatal care with me as her PCP who knows her conditions well. She understands risk for fetal demise or other complications.   - Monitoring reviewed; BS are in range 100% at this time (2 mildly elevated sugars during steroid burst)   - Following with GDM educator, Endocrinology and MFM  - Continues daily 10K+ steps at work; encouraged movement after meals  - Already gets eye exams for her plaquenil so will add in conversation about prediabetes monitoring     MFM recs:  ? A1c, CMP, LFTs, CBC every trimester ( getting today at  appt, normal in March)   ? Urine cultures every trimester (done last at 3rd trimester 28wks on ) and aggressive treatment of bacteriuria (July/Aug next)  ? Baseline laboratories done at [facility] (A1c, CMP/LFTs, CBC, TSH Ucx, Pro:Cr ratio, EKG)     surveillance    Declined MSAFP at 15-22 weeks to screen for open neural tube defects     Comprehensive ultrasound at 18-20 weeks (normal)    Fetal echocardiogram as per above (normal)    Serial ultrasounds for fetal growth monthly starting at 24 weeks gestation given co-morbidities present     fetal testing with twice weekly BPP at 32 weeks    Ordered weekly BPPs (at [facility]) to start @ 32 weeks with NST later in week at our clinic most likely    Delivery    If well-controlled, delivery at 39 weeks is recommended    For women with poorly controlled diabetes, a delivery before 39 weeks may be indicated, and we can help make recommendations as the pregnancy continues    Maternal glucose should be maintained at  mg/dL during delivery; this can be done with an insulin drip and requires hourly finger stick assessment in active labor       Follow up: weekly after her MFM appt   Rebecca Mckenna MD      Next 5 appointments (look out 90 days)     Jul 31, 2023  1:30 PM  (Arrive by 1:20 PM)  Return OB Visit with Rebecca Mckenna MD  [facility] ([facility] ) [address] [place] [phone]  [phone]   Aug 14, 2023  2:00 PM  (Arrive by 1:50 PM)  Return OB Visit with Rebecca Mckenna MD  [facility] ([facility] ) [address] [place] [phone]  [phone]   Aug 21, 2023  2:00 PM  (Arrive by 1:50 PM)  Return OB Visit with Rebecca Mckenna MD  [facility] ([facility] ) [address] [place] [phone]  [phone]   Aug 28, 2023  1:30 PM  (Arrive by 1:20 PM)  Return OB Visit with Rebecca Mckenna MD  [facility] ([facility] ) [address] [place] [phone]  [phone]

## 2023-07-13 ENCOUNTER — PATIENT OUTREACH (OUTPATIENT)
Dept: FAMILY MEDICINE | Facility: CLINIC | Age: 43
End: 2023-07-13
Payer: COMMERCIAL

## 2023-07-13 DIAGNOSIS — R87.610 ASCUS WITH POSITIVE HIGH RISK HPV CERVICAL: ICD-10-CM

## 2023-07-13 DIAGNOSIS — R87.810 ASCUS WITH POSITIVE HIGH RISK HPV CERVICAL: ICD-10-CM

## 2023-07-13 NOTE — TELEPHONE ENCOUNTER
Hi Dr. Mckenna,   43 yo that is due soon for a cotest but she is is also pregnant with a EDC of 09/8/23. Would you still recommend a cotest to be done now or postpartum? Please advise.     Pap Hx:  07/17/17  NILM, neg HPV   07/23/18 ASCUS, Positive for High Risk HPV types other than 16 or 18     08/20/18 [place] visually normal Plan PAP/HPV in 1 year. Crystal Wright MD  04/13/21 NIL Pap, Neg HR HPV   All above in care everywhere   08/1/22 ASCUS Pap, + HR HPV (not 16 or 18) Plan cotest in 1 year due 08/1/23.  Results and recommendations released to the pt through britt, pt viewed.

## 2023-07-17 ENCOUNTER — ANCILLARY PROCEDURE (OUTPATIENT)
Dept: ULTRASOUND IMAGING | Facility: HOSPITAL | Age: 43
End: 2023-07-17
Attending: OBSTETRICS & GYNECOLOGY
Payer: COMMERCIAL

## 2023-07-17 ENCOUNTER — OFFICE VISIT (OUTPATIENT)
Dept: MATERNAL FETAL MEDICINE | Facility: HOSPITAL | Age: 43
End: 2023-07-17
Attending: OBSTETRICS & GYNECOLOGY
Payer: COMMERCIAL

## 2023-07-17 DIAGNOSIS — R76.8 SS-A ANTIBODY POSITIVE: Primary | ICD-10-CM

## 2023-07-17 DIAGNOSIS — O09.523 ADVANCED MATERNAL AGE IN MULTIGRAVIDA, THIRD TRIMESTER: ICD-10-CM

## 2023-07-17 DIAGNOSIS — O09.522 AMA (ADVANCED MATERNAL AGE) MULTIGRAVIDA 35+, SECOND TRIMESTER: ICD-10-CM

## 2023-07-17 DIAGNOSIS — O09.899 HISTORY OF PRETERM DELIVERY, CURRENTLY PREGNANT: ICD-10-CM

## 2023-07-17 DIAGNOSIS — R76.8 SS-A ANTIBODY POSITIVE: ICD-10-CM

## 2023-07-17 DIAGNOSIS — O24.113 PREGNANCY COMPLICATED BY PRE-EXISTING TYPE 2 DIABETES, THIRD TRIMESTER: ICD-10-CM

## 2023-07-17 PROCEDURE — 76816 OB US FOLLOW-UP PER FETUS: CPT

## 2023-07-17 PROCEDURE — 76816 OB US FOLLOW-UP PER FETUS: CPT | Mod: 26 | Performed by: OBSTETRICS & GYNECOLOGY

## 2023-07-17 PROCEDURE — 76819 FETAL BIOPHYS PROFIL W/O NST: CPT | Mod: 26 | Performed by: OBSTETRICS & GYNECOLOGY

## 2023-07-17 PROCEDURE — 99207 PR NO CHARGE LOS: CPT | Performed by: OBSTETRICS & GYNECOLOGY

## 2023-07-17 NOTE — NURSING NOTE
Earnestine Guillen is a  at 32w3d who presents to [facility] for follow up growth ultrasound. Pt reports positive fetal movement. Pt denies bldg/lof/change in discharge, contractions, headache, vision changes, chest pain/SOB or edema. SBAR given to Dr. Christianson, see note in Epic.

## 2023-07-20 ENCOUNTER — HOSPITAL ENCOUNTER (OUTPATIENT)
Dept: ULTRASOUND IMAGING | Facility: CLINIC | Age: 43
Discharge: HOME | End: 2023-07-20
Attending: FAMILY MEDICINE | Admitting: FAMILY MEDICINE
Payer: COMMERCIAL

## 2023-07-20 DIAGNOSIS — R73.03 PREDIABETES: ICD-10-CM

## 2023-07-20 DIAGNOSIS — O09.529 SUPERVISION OF HIGH-RISK PREGNANCY OF ELDERLY MULTIGRAVIDA: ICD-10-CM

## 2023-07-20 DIAGNOSIS — R76.8 SS-A ANTIBODY POSITIVE: ICD-10-CM

## 2023-07-20 DIAGNOSIS — Z86.32 HISTORY OF DIET CONTROLLED GESTATIONAL DIABETES MELLITUS (GDM): ICD-10-CM

## 2023-07-20 DIAGNOSIS — Z87.59 HISTORY OF PRETERM PREMATURE RUPTURE OF MEMBRANES (PPROM): ICD-10-CM

## 2023-07-20 DIAGNOSIS — M35.01 SJOGREN'S SYNDROME WITH KERATOCONJUNCTIVITIS SICCA (H): ICD-10-CM

## 2023-07-20 DIAGNOSIS — N96 HISTORY OF RECURRENT MISCARRIAGES: ICD-10-CM

## 2023-07-20 PROCEDURE — 76819 FETAL BIOPHYS PROFIL W/O NST: CPT

## 2023-07-24 ENCOUNTER — PRENATAL OFFICE VISIT (OUTPATIENT)
Dept: FAMILY MEDICINE | Facility: CLINIC | Age: 43
End: 2023-07-24
Payer: COMMERCIAL

## 2023-07-24 VITALS
SYSTOLIC BLOOD PRESSURE: 98 MMHG | WEIGHT: 150.2 LBS | TEMPERATURE: 97.5 F | HEART RATE: 88 BPM | HEIGHT: 60 IN | OXYGEN SATURATION: 98 % | DIASTOLIC BLOOD PRESSURE: 70 MMHG | BODY MASS INDEX: 29.49 KG/M2 | RESPIRATION RATE: 12 BRPM

## 2023-07-24 DIAGNOSIS — O24.410 DIET CONTROLLED GESTATIONAL DIABETES MELLITUS (GDM) IN THIRD TRIMESTER: Primary | ICD-10-CM

## 2023-07-24 DIAGNOSIS — Z87.59 HISTORY OF PRETERM PREMATURE RUPTURE OF MEMBRANES (PPROM): ICD-10-CM

## 2023-07-24 DIAGNOSIS — O09.529 SUPERVISION OF HIGH-RISK PREGNANCY OF ELDERLY MULTIGRAVIDA: ICD-10-CM

## 2023-07-24 DIAGNOSIS — R79.89 ABNORMAL LFTS: ICD-10-CM

## 2023-07-24 LAB
ALBUMIN SERPL BCG-MCNC: 3.2 G/DL (ref 3.5–5.2)
ALP SERPL-CCNC: 334 U/L (ref 35–104)
ALT SERPL W P-5'-P-CCNC: 30 U/L (ref 0–50)
ANION GAP SERPL CALCULATED.3IONS-SCNC: 11 MMOL/L (ref 7–15)
AST SERPL W P-5'-P-CCNC: 32 U/L (ref 0–45)
BILIRUB SERPL-MCNC: 0.3 MG/DL
BUN SERPL-MCNC: 11.8 MG/DL (ref 6–20)
CALCIUM SERPL-MCNC: 9.1 MG/DL (ref 8.6–10)
CHLORIDE SERPL-SCNC: 105 MMOL/L (ref 98–107)
CREAT SERPL-MCNC: 0.57 MG/DL (ref 0.51–0.95)
DEPRECATED HCO3 PLAS-SCNC: 19 MMOL/L (ref 22–29)
ERYTHROCYTE [DISTWIDTH] IN BLOOD BY AUTOMATED COUNT: 13.8 % (ref 10–15)
GFR SERPL CREATININE-BSD FRML MDRD: >90 ML/MIN/1.73M2
GLUCOSE SERPL-MCNC: 185 MG/DL (ref 70–99)
HCT VFR BLD AUTO: 38.1 % (ref 35–47)
HGB BLD-MCNC: 12.9 G/DL (ref 11.7–15.7)
MCH RBC QN AUTO: 26.9 PG (ref 26.5–33)
MCHC RBC AUTO-ENTMCNC: 33.9 G/DL (ref 31.5–36.5)
MCV RBC AUTO: 79 FL (ref 78–100)
PLATELET # BLD AUTO: 151 10E3/UL (ref 150–450)
POTASSIUM SERPL-SCNC: 3.8 MMOL/L (ref 3.4–5.3)
PROT SERPL-MCNC: 6.9 G/DL (ref 6.4–8.3)
RBC # BLD AUTO: 4.8 10E6/UL (ref 3.8–5.2)
SODIUM SERPL-SCNC: 135 MMOL/L (ref 136–145)
WBC # BLD AUTO: 5.2 10E3/UL (ref 4–11)

## 2023-07-24 PROCEDURE — 85027 COMPLETE CBC AUTOMATED: CPT | Performed by: FAMILY MEDICINE

## 2023-07-24 PROCEDURE — 80053 COMPREHEN METABOLIC PANEL: CPT | Performed by: FAMILY MEDICINE

## 2023-07-24 PROCEDURE — 59025 FETAL NON-STRESS TEST: CPT | Performed by: FAMILY MEDICINE

## 2023-07-24 PROCEDURE — 99207 PR PRENATAL VISIT: CPT | Performed by: FAMILY MEDICINE

## 2023-07-24 PROCEDURE — 36415 COLL VENOUS BLD VENIPUNCTURE: CPT | Performed by: FAMILY MEDICINE

## 2023-07-24 NOTE — PROGRESS NOTES
MICHELLE  33w3d     Estimated Date of Delivery: Sep 8, 2023    Wt Readings from Last 3 Encounters:   23 68.1 kg (150 lb 3.2 oz)   07/10/23 66.3 kg (146 lb 3.2 oz)   23 66 kg (145 lb 7 oz)       Chief Complaint   Patient presents with    Prenatal Care     Return OB, NST       S: She is feeling well   She has rare dawna sandra about a day (usually it occurs at night). No LEAKAGE OF FLUID OR BLEEDING or bleeding     She has a GI appt for  to evaluate her mildly elevated LFTs-  they are working to get her in for an appt given the mildly elevated LFTs  On  (steroids, amoxicillin at that time for URI illness); similar results on  and declines labs today    Blood sugars are still stable; within expected range.     EFW 73rd percentile on 2023 ultrasound with normal [place] interval @ 24wks.    Plan to have growth ultrasounds every 4 weeks until the end of the pregnancy and twice weekly  testing starting at 32 weeks given her pregestational diabetes mellitus.  Today   NON STRESS TEST  ---------------------------------------------------------------------------------------------------------  NST interpretation: reactive. Test duration 15 min. Baseline  bpm.Baseline variability: moderate. Accelerations: present. Decelerations: absent. Uterine activity:  absent. CTG category: normal/reassuring Category I       [place]     She has been taking daily ASA 81mg    + fetal movement.   No headaches, vision change, RUQ abdominal pain, LE swelling.       O: Vitals reviewed, see prenatal flowsheet for measurements.   appears well, no acute distress.  Normal respiratory effort.  Abdomen is soft. No LE swelling.     A/P:     Supervision of high-risk pregnancy of elderly multigravida  - M following for her hx of AMA, Sjogrens (+SSA/SSB), recurrent miscarriages, hx [id number] pregestational prediabetes 5.7% A1c @ 7wks this pregnancy, hx pre term birth. Will need  "cervical ultrasounds.  Her karyotype has been normal. Ike hasn't had his done yet - not sure if covered by insurance.   - MFM and ENDO notes since last visit reviewed in detail   - NIPT normal,   - It's a BOY!!  \"Rodney\", [facility] for peds; plans to breastfeed,  EKG  - Continues ASA 81mg      Hx recurrent pregnancy loss  No recent spotting. No evidence for CHERYL on dating US  - Has seen [facility] for pre pregnancy consult in Nov. Updated labs 23 for LA, Cardiolipin, B2G, etc were reassuring/normal  - TSH/TPO done on 1/3/23 and both are negative/normal range  - maternal karyotype normal  - will need growth ultrasounds 28wk (done) and 34wk ()     Hx of PPROM/spontaneous late  birth  MFM Recommendations:               NOT recommended to proceed with Loretta this pregnancy   Early ultrasound to confirm KRISHNA. (done)  Optimize modifiable risk factors: smoking cessation, avoidance of cocaine, maintenance of adequate nutrition (done)  Baseline transvaginal cervical length at 16 weeks' gestation with measurements every 2 weeks through 23 weeks. (done, normal; cerclage not needed)     corticosteroids if the patient is deemed to be at increased risk of imminent  delivery.  Clinical evaluation of  labor symptoms.         + SSA/SSB antibodies/Sjogren's syndrome  Hx autoimmune hepatitis; resolved.   Symptoms stable; continue Rheumatologic meds (plaquenil, imuran). Understands risk for  lupus (rash and congenital heart block) The risk of developing heart block in offspring of women with anti-Ro/SSA antibodies in the absence of a prior birth with  lupus syndrome is approximately 1-2%.  It increases to 15-20% if a previous child was affected (baby Ike is not yet affected).    MFM Recommendations  Baseline creatinine, liver function tests, platelets and urine P/Cr ratio (done)   q trimester CMP (done in [place] last; will recheck @ 34-36wks)  - Fetal Echos/MA intervals at 16, 20, " 24 weeks  (done!)  - MD interval with MFM at 18, 22 and 26 weeks.  (done!)  - Baseline EKG (B appt) was done; normal  ---> fetal EKG needed postnatally     Pregestational Prediabetes this pregnancy  History of diet controlled gestational diabetes mellitus (GDM)  A1c 5.7% @ 7wks visit; we proceeded with 1hr and 3hr GTTs which were very high.   - See full MFM consult; pt has been fully counseled through MFM and reiterated today with myself  all the risks to pregnacy regarding her complex medical conditions and she continues to desire prenatal care with me as her PCP who knows her conditions well. She understands risk for fetal demise or other complications.   - Monitoring reviewed; BS are in range 100% at this time (2 mildly elevated sugars during steroid burst)   - Following with GDM educator, Endocrinology and MFM  - Continues daily 10K+ steps at work; encouraged movement after meals  - Already gets eye exams for her plaquenil so will add in conversation about prediabetes monitoring     MFM recs:  A1c, CMP, LFTs, CBC every trimester ( getting today at  appt, normal in March)   Urine cultures every trimester (done last at 3rd trimester 28wks on ) and aggressive treatment of bacteriuria (July/Aug next)  Baseline laboratories done at [facility] (A1c, CMP/LFTs, CBC, TSH Ucx, Pro:Cr ratio, EKG)     surveillance  Declined MSAFP at 15-22 weeks to screen for open neural tube defects   Comprehensive ultrasound at 18-20 weeks (normal)  Fetal echocardiogram as per above (normal)  Serial ultrasounds for fetal growth monthly starting at 24 weeks gestation given co-morbidities present   fetal testing with twice weekly BPP at 32 weeks  Ordered weekly BPPs (at [facility]) to start @ 32 weeks with NST later in week at our clinic most likely    Delivery  If well-controlled, delivery at 39 weeks is recommended  For women with poorly controlled diabetes, a delivery before 39 weeks may be indicated, and we can help make  recommendations as the pregnancy continues  Maternal glucose should be maintained at  mg/dL during delivery; this can be done with an insulin drip and requires hourly finger stick assessment in active labor       Follow up: weekly  till delivery .  Continue follow-up with her primary    Aaliyah Boston MD

## 2023-07-27 ENCOUNTER — HOSPITAL ENCOUNTER (OUTPATIENT)
Dept: ULTRASOUND IMAGING | Facility: CLINIC | Age: 43
Discharge: HOME | End: 2023-07-27
Attending: FAMILY MEDICINE | Admitting: FAMILY MEDICINE
Payer: COMMERCIAL

## 2023-07-27 DIAGNOSIS — O09.529 SUPERVISION OF HIGH-RISK PREGNANCY OF ELDERLY MULTIGRAVIDA: ICD-10-CM

## 2023-07-27 DIAGNOSIS — R73.03 PREDIABETES: ICD-10-CM

## 2023-07-27 DIAGNOSIS — Z87.59 HISTORY OF PRETERM PREMATURE RUPTURE OF MEMBRANES (PPROM): ICD-10-CM

## 2023-07-27 DIAGNOSIS — N96 HISTORY OF RECURRENT MISCARRIAGES: ICD-10-CM

## 2023-07-27 DIAGNOSIS — Z86.32 HISTORY OF DIET CONTROLLED GESTATIONAL DIABETES MELLITUS (GDM): ICD-10-CM

## 2023-07-27 DIAGNOSIS — M35.01 SJOGREN'S SYNDROME WITH KERATOCONJUNCTIVITIS SICCA (H): ICD-10-CM

## 2023-07-27 DIAGNOSIS — R76.8 SS-A ANTIBODY POSITIVE: ICD-10-CM

## 2023-07-27 PROCEDURE — 76819 FETAL BIOPHYS PROFIL W/O NST: CPT

## 2023-07-31 ENCOUNTER — PRENATAL OFFICE VISIT (OUTPATIENT)
Dept: FAMILY MEDICINE | Facility: CLINIC | Age: 43
End: 2023-07-31
Payer: COMMERCIAL

## 2023-07-31 VITALS
BODY MASS INDEX: 29.28 KG/M2 | SYSTOLIC BLOOD PRESSURE: 124 MMHG | DIASTOLIC BLOOD PRESSURE: 80 MMHG | OXYGEN SATURATION: 97 % | WEIGHT: 149.9 LBS | HEART RATE: 68 BPM

## 2023-07-31 DIAGNOSIS — R73.03 PREDIABETES: ICD-10-CM

## 2023-07-31 DIAGNOSIS — N96 HISTORY OF RECURRENT MISCARRIAGES: ICD-10-CM

## 2023-07-31 DIAGNOSIS — Z87.59 HISTORY OF PRETERM PREMATURE RUPTURE OF MEMBRANES (PPROM): ICD-10-CM

## 2023-07-31 DIAGNOSIS — O09.529 SUPERVISION OF HIGH-RISK PREGNANCY OF ELDERLY MULTIGRAVIDA: Primary | ICD-10-CM

## 2023-07-31 DIAGNOSIS — R79.89 ABNORMAL LFTS: ICD-10-CM

## 2023-07-31 DIAGNOSIS — O24.410 DIET CONTROLLED GESTATIONAL DIABETES MELLITUS (GDM) IN THIRD TRIMESTER: ICD-10-CM

## 2023-07-31 DIAGNOSIS — N89.8 VAGINAL DISCHARGE: ICD-10-CM

## 2023-07-31 LAB
CLUE CELLS: ABNORMAL
TRICHOMONAS, WET PREP: ABNORMAL
WBC'S/HIGH POWER FIELD, WET PREP: ABNORMAL
YEAST, WET PREP: PRESENT

## 2023-07-31 PROCEDURE — 99207 PR COMPLICATED OB VISIT: CPT | Performed by: FAMILY MEDICINE

## 2023-07-31 PROCEDURE — 87210 SMEAR WET MOUNT SALINE/INK: CPT | Performed by: FAMILY MEDICINE

## 2023-07-31 PROCEDURE — 87086 URINE CULTURE/COLONY COUNT: CPT | Performed by: FAMILY MEDICINE

## 2023-07-31 NOTE — PATIENT INSTRUCTIONS
Please call [facility] at [phone] to speak to a nurse if you think you may be in labor or if your water ruptures or if you have any other urgent questions at any time of day or night.     Check out the main website for a VIRTUAL TOUR!!  Please note during COVID certain modalities may or may not be available (such as water birth, though tubs for laboring are available).  [email or web address]

## 2023-07-31 NOTE — PROGRESS NOTES
MICHELLE  34w3d    Estimated Date of Delivery: Sep 8, 2023    Wt Readings from Last 3 Encounters:   07/31/23 68 kg (149 lb 14.4 oz)   07/24/23 68.1 kg (150 lb 3.2 oz)   07/10/23 66.3 kg (146 lb 3.2 oz)       Chief Complaint   Patient presents with    Prenatal Care     34 weeks       S: She is feeling well . Recent BPP on 7/27 was 8/8, next on 8/3.   We will do the NST today for twice weekly monitoring.     Her GI doctors at [facility] were called about her abnormal LFTs previously (now downtrending) want us to monitor LFTs q 3 months and reassess after the pregnancy if we need to do further evaluation/work up . Suspected elevated AST/ALT from abx/steroids at that time.  No RUQ abdominal pain    On sat woke up with R>L foot swelling but sustained a lot of bugbites to that foot; after going into the pool the swelling resolved.   No other foot swelling since that time.   BP normal.       She has rare dawna sandra about a day (usually it occurs at night). No LEAKAGE OF FLUID OR BLEEDING     Blood sugars are still stable; within expected range.     She has been taking daily ASA 81mg    + fetal movement.   No headaches, vision change, RUQ abdominal pain, LE swelling.       O: Vitals reviewed, see prenatal flowsheet for measurements.   appears well, no acute distress.  Normal respiratory effort.  Abdomen is soft. No LE swelling.       Non Stress Test (NST)  Performed on 7/31/2023 at 34w3d.  Indication: pregestational prediabetes    Fetal Heart Rate Base Line: 135   Accelerations: present  Reactive: Yes  Decelerations: absent  Uterine Activity: none  Interpretation: Cat 1 reassuring    A/P:     Supervision of high-risk pregnancy of elderly multigravida  - MFM following for her hx of AMA, Sjogrens (+SSA/SSB), recurrent miscarriages, hx [id number] pregestational prediabetes 5.7% A1c @ 7wks this pregnancy, hx pre term birth. Will need cervical ultrasounds.  Her karyotype has been normal. Ike hasn't had his done yet - not sure  "if covered by insurance.   - MFM and ENDO notes since last visit reviewed in detail   - NIPT normal,   - It's a BOY!!  \"Rodney\", Clarisa for peds; plans to breastfeed,  EKG  - Continues ASA 81mg      Hx recurrent pregnancy loss  No recent spotting. No evidence for CHERYL on dating US  - Has seen MFM for pre pregnancy consult in Nov. Updated labs 23 for LA, Cardiolipin, B2G, etc were reassuring/normal  - TSH/TPO done on 1/3/23 and both are negative/normal range  - maternal karyotype normal  - will need growth ultrasounds 28wk (done) and 34wk () (done)     Hx of PPROM/spontaneous late  birth  MFM Recommendations:               NOT recommended to proceed with [place] this pregnancy   Early ultrasound to confirm KRISHNA. (done)  Optimize modifiable risk factors: smoking cessation, avoidance of cocaine, maintenance of adequate nutrition (done)  Baseline transvaginal cervical length at 16 weeks' gestation with measurements every 2 weeks through 23 weeks. (done, normal; cerclage not needed)     corticosteroids if the patient is deemed to be at increased risk of imminent  delivery.  Clinical evaluation of  labor symptoms.         + SSA/SSB antibodies/Sjogren's syndrome  Hx autoimmune hepatitis; resolved.   Symptoms stable; continue Rheumatologic meds (plaquenil, imuran). Understands risk for  lupus (rash and congenital heart block) The risk of developing heart block in offspring of women with anti-Ro/SSA antibodies in the absence of a prior birth with  lupus syndrome is approximately 1-2%.  It increases to 15-20% if a previous child was affected (baby Ike is not yet affected).    MFM Recommendations  Baseline creatinine, liver function tests, platelets and urine P/Cr ratio (done)   q trimester CMP (done 23 last)  - Fetal Echos/WA intervals at 16, 20, 24 weeks  (done!)  - WA interval with MFM at 18, 22 and 26 weeks.  (done!)  - Baseline EKG (IOB appt) was done; " normal  ---> fetal EKG needed postnatally     Pregestational Prediabetes this pregnancy  History of diet controlled gestational diabetes mellitus (GDM)  A1c 5.7% @ 7wks visit; we proceeded with 1hr and 3hr GTTs which were very high.   - See full MFM consult; pt has been fully counseled through MFM and reiterated today with myself  all the risks to pregnacy regarding her complex medical conditions and she continues to desire prenatal care with me as her PCP who knows her conditions well. She understands risk for fetal demise or other complications.   - Monitoring reviewed; BS are in range 100% at this time (2 mildly elevated sugars during steroid burst)   - Following with GDM educator, Endocrinology and MFM  - Continues daily 10K+ steps at work; encouraged movement after meals  - Already gets eye exams for her plaquenil so will add in conversation about prediabetes monitoring     MFM recs:  A1c, CMP, LFTs, CBC every trimester ( last normal July aside from Alkaline phosphatase elevated at 334, AST/ALT normalized- sees GI for these- hx autoimmune hepatitis)  Urine cultures every trimester (done last at 3rd trimester 28wks on ) and aggressive treatment of bacteriuria (July/Aug next)  Baseline laboratories done at [facility] (A1c, CMP/LFTs, CBC, TSH Ucx, Pro:Cr ratio, EKG)     surveillance  Declined MSAFP at 15-22 weeks to screen for open neural tube defects   Comprehensive ultrasound at 18-20 weeks (normal)  Fetal echocardiogram as per above (normal)  Serial ultrasounds for fetal growth monthly starting at 24 weeks gestation given co-morbidities present   fetal testing twice weekly at 32 weeks  Ordered weekly BPPs (at [facility]) to start @ 32 weeks with NST later in week at our clinic     Delivery  If well-controlled, delivery at 39 weeks is recommended  For women with poorly controlled diabetes, a delivery before 39 weeks may be indicated, and we can help make recommendations as the pregnancy  continues  Maternal glucose should be maintained at  mg/dL during delivery; this can be done with an insulin drip and requires hourly finger stick assessment in active labor       Follow up: weekly  till delivery .      Rebecca Mckenna MD  Next 5 appointments (look out 90 days)      Jul 31, 2023  1:30 PM  (Arrive by 1:20 PM)  Return OB Visit with Rebecca Mckenna MD  [facility] ([facility] ) [address] [place] [address]  [phone]     Aug 07, 2023 11:00 AM  (Arrive by 10:50 AM)  Return OB Visit with Rebecca Mckenna MD  [facility] ([facility] ) [address] [place] [address]  [phone]     Aug 14, 2023  2:00 PM  (Arrive by 1:50 PM)  Return OB Visit with Rebecca Mckenna MD  [facility] ([facility] ) [address] [place] [address]  [phone]     Aug 21, 2023  2:00 PM  (Arrive by 1:50 PM)  Return OB Visit with Rebecca Mckenna MD  [facility] ([facility] ) [address] [place] [address]  [phone]     Aug 28, 2023  1:30 PM  (Arrive by 1:20 PM)  Return OB Visit with Rebecca Mckenna MD  [facility] ([facility] ) [address] [place] [address]  [phone]

## 2023-08-02 LAB — BACTERIA UR CULT: NO GROWTH

## 2023-08-03 ENCOUNTER — HOSPITAL ENCOUNTER (OUTPATIENT)
Dept: ULTRASOUND IMAGING | Facility: CLINIC | Age: 43
Discharge: HOME | End: 2023-08-03
Attending: FAMILY MEDICINE | Admitting: FAMILY MEDICINE
Payer: COMMERCIAL

## 2023-08-03 DIAGNOSIS — R76.8 SS-A ANTIBODY POSITIVE: ICD-10-CM

## 2023-08-03 DIAGNOSIS — N96 HISTORY OF RECURRENT MISCARRIAGES: ICD-10-CM

## 2023-08-03 DIAGNOSIS — Z86.32 HISTORY OF DIET CONTROLLED GESTATIONAL DIABETES MELLITUS (GDM): ICD-10-CM

## 2023-08-03 DIAGNOSIS — M35.01 SJOGREN'S SYNDROME WITH KERATOCONJUNCTIVITIS SICCA (H): ICD-10-CM

## 2023-08-03 DIAGNOSIS — R73.03 PREDIABETES: ICD-10-CM

## 2023-08-03 DIAGNOSIS — Z87.59 HISTORY OF PRETERM PREMATURE RUPTURE OF MEMBRANES (PPROM): ICD-10-CM

## 2023-08-03 DIAGNOSIS — O09.529 SUPERVISION OF HIGH-RISK PREGNANCY OF ELDERLY MULTIGRAVIDA: ICD-10-CM

## 2023-08-03 PROCEDURE — 76819 FETAL BIOPHYS PROFIL W/O NST: CPT

## 2023-08-07 ENCOUNTER — PRENATAL OFFICE VISIT (OUTPATIENT)
Dept: FAMILY MEDICINE | Facility: CLINIC | Age: 43
End: 2023-08-07
Payer: COMMERCIAL

## 2023-08-07 VITALS
HEART RATE: 68 BPM | BODY MASS INDEX: 28.88 KG/M2 | DIASTOLIC BLOOD PRESSURE: 82 MMHG | WEIGHT: 147.9 LBS | OXYGEN SATURATION: 97 % | SYSTOLIC BLOOD PRESSURE: 110 MMHG

## 2023-08-07 DIAGNOSIS — O24.410 DIET CONTROLLED GESTATIONAL DIABETES MELLITUS (GDM) IN THIRD TRIMESTER: ICD-10-CM

## 2023-08-07 DIAGNOSIS — N96 HISTORY OF RECURRENT MISCARRIAGES: ICD-10-CM

## 2023-08-07 DIAGNOSIS — O09.529 SUPERVISION OF HIGH-RISK PREGNANCY OF ELDERLY MULTIGRAVIDA: Primary | ICD-10-CM

## 2023-08-07 DIAGNOSIS — R79.89 ABNORMAL LFTS: ICD-10-CM

## 2023-08-07 DIAGNOSIS — R73.03 PREDIABETES: ICD-10-CM

## 2023-08-07 DIAGNOSIS — Z87.59 HISTORY OF PRETERM PREMATURE RUPTURE OF MEMBRANES (PPROM): ICD-10-CM

## 2023-08-07 PROCEDURE — 99207 PR COMPLICATED OB VISIT: CPT | Performed by: FAMILY MEDICINE

## 2023-08-07 PROCEDURE — 87653 STREP B DNA AMP PROBE: CPT | Performed by: FAMILY MEDICINE

## 2023-08-07 NOTE — PATIENT INSTRUCTIONS
You can call the radiology department at [phone] to schedule your imaging test that was ordered.

## 2023-08-07 NOTE — PROGRESS NOTES
MICHELLE  34w3d    Estimated Date of Delivery: Sep 8, 2023    Wt Readings from Last 3 Encounters:   08/07/23 67.1 kg (147 lb 14.4 oz)   07/31/23 68 kg (149 lb 14.4 oz)   07/24/23 68.1 kg (150 lb 3.2 oz)       Chief Complaint   Patient presents with    Prenatal Care     36 weeks       S: She is feeling well . Recent BPP on 8/3/23 was 8/8  We will do the NST today for twice weekly monitoring.     On Friday 3d ago she had scant brown spotting with wiping that occurred about 3x; no increase in mucus discharge or other concerning sx of bleeding nor cramping. Nor loss of fluid.  She had been on about day 4 of 7 of her vaginal yeast treatment and sx improving at that time. She has since that time completed the 7 day course and yeast sx resolved.       No other foot swelling since that time.   BP normal.       She has rare dawna sandra about 2-3x a day (usually it occurs at night). No LEAKAGE OF FLUID OR BLEEDING     Blood sugars are still stable; within normal ranges and checking post prandially and fastings. 93 fasting today for example; she tried to upload/send the records to the diabetic educator on Friday but I can't see the cashcloudt reply so encouraged her to send it again.     She has been taking daily ASA 81mg    + fetal movement.   No headaches, vision change, RUQ abdominal pain, LE swelling.       O: Vitals reviewed, see prenatal flowsheet for measurements.   appears well, no acute distress.  Normal respiratory effort.  Abdomen is soft. No LE swelling.   Cephalic by bedside ultrasound    Non Stress Test (NST)  8/7/2023   Indication: pregestational prediabetes    Fetal Heart Rate Base Line: 130-135   Accelerations: present  Reactive: Yes  Decelerations: absent  Uterine Activity: none  Interpretation: Cat 1 reassuring    A/P:     Supervision of high-risk pregnancy of elderly multigravida  - MFM following for her hx of AMA, Sjogrens (+SSA/SSB), recurrent miscarriages, hx [id number] pregestational prediabetes 5.7% A1c @ 7wks  "this pregnancy, hx pre term birth. Will need cervical ultrasounds.  Her karyotype has been normal. Ike hasn't had his done yet - not sure if covered by insurance.   - MFM and ENDO notes since last visit reviewed in detail   - NIPT normal,   - It's a BOY!!  \"Rodney\", Clarisa for peds; plans to breastfeed,  EKG. EFW 74%ile @ 34 weeks  - Continues ASA 81mg   - GBS today 2023   - Reviewed goal to continue with Thursday BPPs @ [facility]; Monday's has MFM/MICHELLE appts with me for NSTs  - Reviewed sx/signs for  labor as she is very familiar with this     Hx recurrent pregnancy loss  No recent spotting. No evidence for CHERYL on dating US  - Has seen MFM for pre pregnancy consult in Nov. Updated labs 23 for LA, Cardiolipin, B2G, etc were reassuring/normal  - TSH/TPO done on 1/3/23 and both are negative/normal range  - maternal karyotype normal  - will need growth ultrasounds 28wk (done) and 34wk () (done)     Hx of PPROM/spontaneous late  birth  MFM Recommendations:               NOT recommended to proceed with Loretta this pregnancy   Early ultrasound to confirm KRISHNA. (done)  Optimize modifiable risk factors: smoking cessation, avoidance of cocaine, maintenance of adequate nutrition (done)  Baseline transvaginal cervical length at 16 weeks' gestation with measurements every 2 weeks through 23 weeks. (done, normal; cerclage not needed)     corticosteroids if the patient is deemed to be at increased risk of imminent  delivery.  Clinical evaluation of  labor symptoms.         + SSA/SSB antibodies/Sjogren's syndrome  Hx autoimmune hepatitis; resolved.   Symptoms stable; continue Rheumatologic meds (plaquenil, imuran). Understands risk for  lupus (rash and congenital heart block) The risk of developing heart block in offspring of women with anti-Ro/SSA antibodies in the absence of a prior birth with  lupus syndrome is approximately 1-2%.  It increases to 15-20% if a " previous child was affected (baby Ike is not yet affected).    MFM Recommendations  Baseline creatinine, liver function tests, platelets and urine P/Cr ratio (done)   q trimester CMP (done 23 last, plan for next visit)  - Fetal Echos/HI intervals at 16, 20, 24 weeks  (done!)  - HI interval with MFM at 18, 22 and 26 weeks.  (done!)  - Baseline EKG (IOB appt) was done; normal  ---> fetal EKG needed postnatally     Pregestational Prediabetes this pregnancy  History of diet controlled gestational diabetes mellitus (GDM)  A1c 5.7% @ 7wks visit; we proceeded with 1hr and 3hr GTTs which were very high.   - See full MFM consult; pt has been fully counseled through MFM and reiterated today with myself  all the risks to pregnacy regarding her complex medical conditions and she continues to desire prenatal care with me as her PCP who knows her conditions well. She understands risk for fetal demise or other complications.   - Monitoring reviewed; BS are in range 100% at this time (2 mildly elevated sugars during steroid burst)   - Following with GDM educator, Endocrinology and MFM  - Continues daily 10K+ steps at work; encouraged movement after meals  - Already gets eye exams for her plaquenil so will add in conversation about prediabetes monitoring     MFM recs:  A1c, CMP, LFTs, CBC every trimester ( last normal July aside from Alkaline phosphatase elevated at 334, AST/ALT normalized- sees GI for these- hx autoimmune hepatitis)  Urine cultures every trimester (done last at 3rd trimester 28wks on , plan for next visit) and aggressive treatment of bacteriuria   Baseline laboratories done at [facility] (A1c, CMP/LFTs, CBC, TSH Ucx, Pro:Cr ratio, EKG)     surveillance  Declined MSAFP at 15-22 weeks to screen for open neural tube defects   Comprehensive ultrasound at 18-20 weeks (normal)  Fetal echocardiogram as per above (normal)  Serial ultrasounds for fetal growth monthly starting at 24 weeks gestation given  co-morbidities present   fetal testing twice weekly at 32 weeks  Ordered weekly BPPs (at [facility]) to start @ 32 weeks with NST later in week at our clinic     Delivery  If well-controlled, delivery at 39 weeks is recommended  For women with poorly controlled diabetes, a delivery before 39 weeks may be indicated, and we can help make recommendations as the pregnancy continues  Maternal glucose should be maintained at  mg/dL during delivery; this can be done with an insulin drip and requires hourly finger stick assessment in active labor       Follow up: weekly  till delivery .  Next week: :A1c/CMP /CBC, UA/cx     Rebecca Mckenna MD      Next 5 appointments (look out 90 days)      Aug 14, 2023  2:00 PM  (Arrive by 1:50 PM)  Return OB Visit with Rebecca Mckenna MD  [facility] ([facility] ) [address] [place] [address]  [phone]     Aug 21, 2023  2:00 PM  (Arrive by 1:50 PM)  Return OB Visit with Rebecca Mckenna MD  [facility] ([facility] ) [address] [place] [address]  [phone]     Aug 28, 2023  1:30 PM  (Arrive by 1:20 PM)  Return OB Visit with Rebecca Mckenna MD  [facility] ([facility] ) [address] [place] [address]  [phone]     Sep 05, 2023  1:00 PM  (Arrive by 12:50 PM)  Return OB Visit with Rebecca Mckenna MD  [facility] ([facility] ) [address] [place] [address]  [phone]

## 2023-08-08 LAB — GP B STREP DNA SPEC QL NAA+PROBE: POSITIVE

## 2023-08-10 ENCOUNTER — HOSPITAL ENCOUNTER (OUTPATIENT)
Dept: ULTRASOUND IMAGING | Facility: CLINIC | Age: 43
Discharge: HOME | End: 2023-08-10
Attending: FAMILY MEDICINE | Admitting: FAMILY MEDICINE
Payer: COMMERCIAL

## 2023-08-10 DIAGNOSIS — R73.03 PREDIABETES: ICD-10-CM

## 2023-08-10 DIAGNOSIS — N96 HISTORY OF RECURRENT MISCARRIAGES: ICD-10-CM

## 2023-08-10 DIAGNOSIS — R79.89 ABNORMAL LFTS: ICD-10-CM

## 2023-08-10 DIAGNOSIS — O24.410 DIET CONTROLLED GESTATIONAL DIABETES MELLITUS (GDM) IN THIRD TRIMESTER: ICD-10-CM

## 2023-08-10 DIAGNOSIS — Z87.59 HISTORY OF PRETERM PREMATURE RUPTURE OF MEMBRANES (PPROM): ICD-10-CM

## 2023-08-10 DIAGNOSIS — O09.529 SUPERVISION OF HIGH-RISK PREGNANCY OF ELDERLY MULTIGRAVIDA: ICD-10-CM

## 2023-08-10 PROCEDURE — 76819 FETAL BIOPHYS PROFIL W/O NST: CPT

## 2023-08-13 DIAGNOSIS — O09.529 SUPERVISION OF HIGH-RISK PREGNANCY OF ELDERLY MULTIGRAVIDA: ICD-10-CM

## 2023-08-13 RX ORDER — ASPIRIN 81 MG/1
81 TABLET, COATED ORAL DAILY
Qty: 90 TABLET | Refills: 1 | OUTPATIENT
Start: 2023-08-13

## 2023-08-13 NOTE — TELEPHONE ENCOUNTER
Refill request Refused - Patient should have refills on file    ASA 81mg was refilled on 5/8/2023 - Disp 90, Ref 1  Sent to [facility] in [facility] in [place]    Shelly Dominique RN  08/13/23 10:22 AM  [facility] Nurse Advisor

## 2023-08-14 ENCOUNTER — ANCILLARY PROCEDURE (OUTPATIENT)
Dept: ULTRASOUND IMAGING | Facility: HOSPITAL | Age: 43
End: 2023-08-14
Attending: OBSTETRICS & GYNECOLOGY
Payer: COMMERCIAL

## 2023-08-14 ENCOUNTER — OFFICE VISIT (OUTPATIENT)
Dept: MATERNAL FETAL MEDICINE | Facility: HOSPITAL | Age: 43
End: 2023-08-14
Attending: OBSTETRICS & GYNECOLOGY
Payer: COMMERCIAL

## 2023-08-14 DIAGNOSIS — O24.113 PREGNANCY COMPLICATED BY PRE-EXISTING TYPE 2 DIABETES, THIRD TRIMESTER: ICD-10-CM

## 2023-08-14 DIAGNOSIS — O09.899 HISTORY OF PRETERM DELIVERY, CURRENTLY PREGNANT: ICD-10-CM

## 2023-08-14 DIAGNOSIS — R76.8 SS-A ANTIBODY POSITIVE: ICD-10-CM

## 2023-08-14 DIAGNOSIS — O09.523 ADVANCED MATERNAL AGE IN MULTIGRAVIDA, THIRD TRIMESTER: Primary | ICD-10-CM

## 2023-08-14 DIAGNOSIS — O09.523 ADVANCED MATERNAL AGE IN MULTIGRAVIDA, THIRD TRIMESTER: ICD-10-CM

## 2023-08-14 PROCEDURE — 99207 PR NO CHARGE LOS: CPT | Performed by: OBSTETRICS & GYNECOLOGY

## 2023-08-14 PROCEDURE — 76816 OB US FOLLOW-UP PER FETUS: CPT | Mod: 26 | Performed by: OBSTETRICS & GYNECOLOGY

## 2023-08-14 PROCEDURE — 76819 FETAL BIOPHYS PROFIL W/O NST: CPT | Mod: 26 | Performed by: OBSTETRICS & GYNECOLOGY

## 2023-08-14 PROCEDURE — 76819 FETAL BIOPHYS PROFIL W/O NST: CPT

## 2023-08-14 NOTE — NURSING NOTE
Earnestine BRENNAN Guillen is a  at 36w3d who presents to [facility] for follow up growth ultrasound and BPP. Pt reports positive fetal movement. Pt denies bldg/lof/change in discharge, contractions, headache, vision changes, chest pain/SOB or edema. SBAR given to Dr. Hein, see note in Epic.

## 2023-08-14 NOTE — PROGRESS NOTES
Please refer to ultrasound report under 'Imaging' Studies of 'Chart Review' tabs.    Vamsi Hein M.D.

## 2023-08-17 ENCOUNTER — HOSPITAL ENCOUNTER (OUTPATIENT)
Dept: ULTRASOUND IMAGING | Facility: CLINIC | Age: 43
Discharge: HOME | End: 2023-08-17
Attending: FAMILY MEDICINE | Admitting: FAMILY MEDICINE
Payer: COMMERCIAL

## 2023-08-17 DIAGNOSIS — R73.03 PREDIABETES: ICD-10-CM

## 2023-08-17 DIAGNOSIS — O24.410 DIET CONTROLLED GESTATIONAL DIABETES MELLITUS (GDM) IN THIRD TRIMESTER: ICD-10-CM

## 2023-08-17 DIAGNOSIS — R79.89 ABNORMAL LFTS: ICD-10-CM

## 2023-08-17 DIAGNOSIS — Z87.59 HISTORY OF PRETERM PREMATURE RUPTURE OF MEMBRANES (PPROM): ICD-10-CM

## 2023-08-17 DIAGNOSIS — N96 HISTORY OF RECURRENT MISCARRIAGES: ICD-10-CM

## 2023-08-17 DIAGNOSIS — O09.529 SUPERVISION OF HIGH-RISK PREGNANCY OF ELDERLY MULTIGRAVIDA: ICD-10-CM

## 2023-08-17 PROCEDURE — 76819 FETAL BIOPHYS PROFIL W/O NST: CPT

## 2023-08-21 ENCOUNTER — PRENATAL OFFICE VISIT (OUTPATIENT)
Dept: FAMILY MEDICINE | Facility: CLINIC | Age: 43
End: 2023-08-21
Payer: COMMERCIAL

## 2023-08-21 VITALS
HEIGHT: 60 IN | DIASTOLIC BLOOD PRESSURE: 80 MMHG | HEART RATE: 68 BPM | TEMPERATURE: 97.3 F | OXYGEN SATURATION: 97 % | SYSTOLIC BLOOD PRESSURE: 122 MMHG | RESPIRATION RATE: 12 BRPM | WEIGHT: 154 LBS | BODY MASS INDEX: 30.23 KG/M2

## 2023-08-21 DIAGNOSIS — O09.529 SUPERVISION OF HIGH-RISK PREGNANCY OF ELDERLY MULTIGRAVIDA: ICD-10-CM

## 2023-08-21 LAB
ALBUMIN SERPL BCG-MCNC: 3.1 G/DL (ref 3.5–5.2)
ALP SERPL-CCNC: 449 U/L (ref 35–104)
ALT SERPL W P-5'-P-CCNC: 23 U/L (ref 0–50)
ANION GAP SERPL CALCULATED.3IONS-SCNC: 11 MMOL/L (ref 7–15)
AST SERPL W P-5'-P-CCNC: 26 U/L (ref 0–45)
BILIRUB SERPL-MCNC: 0.3 MG/DL
BUN SERPL-MCNC: 17.2 MG/DL (ref 6–20)
CALCIUM SERPL-MCNC: 8.6 MG/DL (ref 8.6–10)
CHLORIDE SERPL-SCNC: 107 MMOL/L (ref 98–107)
CREAT SERPL-MCNC: 0.7 MG/DL (ref 0.51–0.95)
DEPRECATED HCO3 PLAS-SCNC: 19 MMOL/L (ref 22–29)
ERYTHROCYTE [DISTWIDTH] IN BLOOD BY AUTOMATED COUNT: 14.6 % (ref 10–15)
GFR SERPL CREATININE-BSD FRML MDRD: >90 ML/MIN/1.73M2
GLUCOSE SERPL-MCNC: 107 MG/DL (ref 70–99)
HBA1C MFR BLD: 6.1 % (ref 0–5.6)
HCT VFR BLD AUTO: 39.2 % (ref 35–47)
HGB BLD-MCNC: 13.2 G/DL (ref 11.7–15.7)
MCH RBC QN AUTO: 26.5 PG (ref 26.5–33)
MCHC RBC AUTO-ENTMCNC: 33.7 G/DL (ref 31.5–36.5)
MCV RBC AUTO: 79 FL (ref 78–100)
PLATELET # BLD AUTO: 130 10E3/UL (ref 150–450)
POTASSIUM SERPL-SCNC: 4.2 MMOL/L (ref 3.4–5.3)
PROT SERPL-MCNC: 6.6 G/DL (ref 6.4–8.3)
RBC # BLD AUTO: 4.99 10E6/UL (ref 3.8–5.2)
SODIUM SERPL-SCNC: 137 MMOL/L (ref 136–145)
WBC # BLD AUTO: 4.6 10E3/UL (ref 4–11)

## 2023-08-21 PROCEDURE — 80053 COMPREHEN METABOLIC PANEL: CPT | Performed by: FAMILY MEDICINE

## 2023-08-21 PROCEDURE — 36415 COLL VENOUS BLD VENIPUNCTURE: CPT | Performed by: FAMILY MEDICINE

## 2023-08-21 PROCEDURE — 83036 HEMOGLOBIN GLYCOSYLATED A1C: CPT | Performed by: FAMILY MEDICINE

## 2023-08-21 PROCEDURE — 85027 COMPLETE CBC AUTOMATED: CPT | Performed by: FAMILY MEDICINE

## 2023-08-21 PROCEDURE — 87086 URINE CULTURE/COLONY COUNT: CPT | Performed by: FAMILY MEDICINE

## 2023-08-21 PROCEDURE — 99207 PR COMPLICATED OB VISIT: CPT | Performed by: FAMILY MEDICINE

## 2023-08-21 NOTE — PROGRESS NOTES
MICHELLE  37w3d    Estimated Date of Delivery: Sep 8, 2023    Wt Readings from Last 3 Encounters:   08/21/23 69.9 kg (154 lb)   08/07/23 67.1 kg (147 lb 14.4 oz)   07/31/23 68 kg (149 lb 14.4 oz)       Chief Complaint   Patient presents with    Prenatal Care     37 weeks, NST today       S: Had contractions q 10min for about 1 hour last night; having them occasionally today . Feels her baby has dropped lower in her pelvis.     BPP on 8/17 was normal with 8/8. EFW on 8/14 was 65%ile with 98%ile for Abdominal circumference  Recommend delivery for well controlled pregestational DM 39 0/7 to 39 6/7 weeks. She is open to 9/1/23 (39wks) if not yet delivered   She did want her cervix checked today       We will do the NST today for twice weekly monitoring.     Blood sugars are still stable; within normal ranges and checking post prandially and fastings.  1hr pp was 124 after breakfast today for exxample  She checks each fasting and post prandials.     She has been taking daily ASA 81mg    + fetal movement.   No headaches, vision change, RUQ abdominal pain, LE swelling.       O: Vitals reviewed, see prenatal flowsheet for measurements.   appears well, no acute distress.  Normal respiratory effort.  Abdomen is soft. No LE swelling.   Cephalic by bedside ultrasound  Cervix: closed/long/thick/posterior/-3    Non Stress Test (NST)  8/21/2023   Indication: pregestational prediabetes    Fetal Heart Rate Base Line: 125 and later 145  Accelerations: present  Reactive: Yes  Decelerations: absent  Uterine Activity: intermittent ctx  Interpretation: Cat 1 reassuring    A/P:     Supervision of high-risk pregnancy of elderly multigravida  - [facility] following for her hx of AMA, Sjogrens (+SSA/SSB), recurrent miscarriages, hx [id number] pregestational prediabetes 5.7% A1c @ 7wks this pregnancy, hx pre term birth. Will need cervical ultrasounds.  Her karyotype has been normal. Ike hasn't had his done yet - not sure if covered by insurance.   - [facility]  "and ENDO notes since last visit reviewed in detail   - NIPT normal,   - It's a BOY!!  \"Rodney\", Clarisa for peds; plans to breastfeed,  EKG. EFW 74%ile @ 34 weeks  - Continues ASA 81mg   - GBS POSITIVE  2023 --> reviewed indication to call/present to L&D if SROM occurs  - Reviewed goal to continue with Thursday BPPs @ [facility]; Monday's has MICHELLE appts with me for NSTs  - Reviewed sx/signs for  labor as she is very familiar with this     Hx recurrent pregnancy loss  No recent spotting. No evidence for CHERYL on dating US  - Has seen MFM for pre pregnancy consult in Nov. Updated labs 23 for LA, Cardiolipin, B2G, etc were reassuring/normal  - TSH/TPO done on 1/3/23 and both are negative/normal range  - maternal karyotype normal  - will need growth ultrasounds 28wk (done) and 34wk () (done)     Hx of PPROM/spontaneous late  birth  MFM Recommendations:               NOT recommended to proceed with [place] this pregnancy   Early ultrasound to confirm KRISHNA. (done)  Optimize modifiable risk factors: smoking cessation, avoidance of cocaine, maintenance of adequate nutrition (done)  Baseline transvaginal cervical length at 16 weeks' gestation with measurements every 2 weeks through 23 weeks. (done, normal; cerclage not needed)     corticosteroids if the patient is deemed to be at increased risk of imminent  delivery.  Clinical evaluation of  labor symptoms.         + SSA/SSB antibodies/Sjogren's syndrome  Hx autoimmune hepatitis; resolved.   Symptoms stable; continue Rheumatologic meds (plaquenil, imuran). Understands risk for  lupus (rash and congenital heart block) The risk of developing heart block in offspring of women with anti-Ro/SSA antibodies in the absence of a prior birth with  lupus syndrome is approximately 1-2%.  It increases to 15-20% if a previous child was affected (baby Ike is not yet affected).    MFM Recommendations  Baseline creatinine, liver " function tests, platelets and urine P/Cr ratio (done)   q trimester CMP (done 23 last, plan for today)  - Fetal Echos/OK intervals at 16, 20, 24 weeks  (done!)  - OK interval with MFM at 18, 22 and 26 weeks.  (done!)  - Baseline EKG (IOB appt) was done; normal  ---> fetal EKG needed postnatally     Pregestational Prediabetes this pregnancy  History of diet controlled gestational diabetes mellitus (GDM)  A1c 5.7% @ 7wks visit; we proceeded with 1hr and 3hr GTTs which were very high.   - See full MFM consult; pt has been fully counseled through MFM and reiterated today with myself  all the risks to pregnacy regarding her complex medical conditions and she continues to desire prenatal care with me as her PCP who knows her conditions well. She understands risk for fetal demise or other complications.   - Monitoring reviewed; BS are in range 100% at this time (2 mildly elevated sugars during steroid burst)   - Following with GDM educator, Endocrinology and MFM  - Continues daily 10K+ steps at work; encouraged movement after meals  - Already gets eye exams for her plaquenil so will add in conversation about prediabetes monitoring     MFM recs:  A1c, CMP, LFTs, CBC every trimester ( last normal July aside from Alkaline phosphatase elevated at 334, AST/ALT normalized- sees GI for these- hx autoimmune hepatitis)  Urine cultures every trimester (done last at 3rd trimester 28wks on , plan for 2023 ) and aggressive treatment of bacteriuria   Baseline laboratories done at [facility] (A1c, CMP/LFTs, CBC, TSH Ucx, Pro:Cr ratio, EKG)     surveillance  Declined MSAFP at 15-22 weeks to screen for open neural tube defects   Comprehensive ultrasound at 18-20 weeks (normal)  Fetal echocardiogram as per above (normal)  Serial ultrasounds for fetal growth monthly starting at 24 weeks gestation given co-morbidities present   fetal testing twice weekly at 32 weeks  Ordered weekly BPPs (at [facility]) to start @ 32 weeks  with NST later in week at our clinic     Delivery  If well-controlled, delivery at 39 weeks is recommended (pt agreeable)  For women with poorly controlled diabetes, a delivery before 39 weeks may be indicated, and we can help make recommendations as the pregnancy continues  Maternal glucose should be maintained at  mg/dL during delivery; this can be done with an insulin drip and requires hourly finger stick assessment in active labor       Follow up: weekly  till delivery .  Next week: :A1c/CMP /CBC, UA/cx     Rebecca Mckenna MD      Next 5 appointments (look out 90 days)      Aug 14, 2023  2:00 PM  (Arrive by 1:50 PM)  Return OB Visit with Rebecca Mckenna MD  [facility] ([facility] ) [address] [place] [id number]  [phone]     Aug 21, 2023  2:00 PM  (Arrive by 1:50 PM)  Return OB Visit with Rebecca Mckenna MD  [facility] ([facility] ) [address] [place] [id number]  [phone]     Aug 28, 2023  1:30 PM  (Arrive by 1:20 PM)  Return OB Visit with Rebecca Mckenna MD  [facility] ([facility] ) [address] [place] [id number]  [phone]     Sep 05, 2023  1:00 PM  (Arrive by 12:50 PM)  Return OB Visit with Rebecca Mckenna MD  [facility] ([facility] ) [address] [place] [id number]  [phone]

## 2023-08-23 ENCOUNTER — HOSPITAL ENCOUNTER (INPATIENT)
Facility: CLINIC | Age: 43
LOS: 3 days | Discharge: HOME | End: 2023-08-27
Attending: FAMILY MEDICINE | Admitting: FAMILY MEDICINE
Payer: COMMERCIAL

## 2023-08-23 DIAGNOSIS — O16.3 HYPERTENSION AFFECTING PREGNANCY IN THIRD TRIMESTER: ICD-10-CM

## 2023-08-23 PROBLEM — Z36.89 ENCOUNTER FOR TRIAGE IN PREGNANT PATIENT: Status: ACTIVE | Noted: 2023-08-23

## 2023-08-23 LAB
ALBUMIN MFR UR ELPH: 10.7 MG/DL (ref 1–14)
ALBUMIN SERPL-MCNC: 2.5 G/DL (ref 3.5–5)
ALP SERPL-CCNC: 481 U/L (ref 45–120)
ALT SERPL W P-5'-P-CCNC: 20 U/L (ref 0–45)
ANION GAP SERPL CALCULATED.3IONS-SCNC: 10 MMOL/L (ref 5–18)
AST SERPL W P-5'-P-CCNC: 27 U/L (ref 0–40)
BACTERIA UR CULT: NO GROWTH
BASOPHILS # BLD AUTO: 0 10E3/UL (ref 0–0.2)
BASOPHILS NFR BLD AUTO: 0 %
BILIRUB SERPL-MCNC: 0.6 MG/DL (ref 0–1)
BUN SERPL-MCNC: 17 MG/DL (ref 8–22)
CALCIUM SERPL-MCNC: 8.4 MG/DL (ref 8.5–10.5)
CHLORIDE BLD-SCNC: 109 MMOL/L (ref 98–107)
CO2 SERPL-SCNC: 17 MMOL/L (ref 22–31)
CREAT SERPL-MCNC: 0.71 MG/DL (ref 0.6–1.1)
CREAT UR-MCNC: 63 MG/DL
EOSINOPHIL # BLD AUTO: 0 10E3/UL (ref 0–0.7)
EOSINOPHIL NFR BLD AUTO: 1 %
ERYTHROCYTE [DISTWIDTH] IN BLOOD BY AUTOMATED COUNT: 15.1 % (ref 10–15)
GFR SERPL CREATININE-BSD FRML MDRD: >90 ML/MIN/1.73M2
GLUCOSE BLD-MCNC: 99 MG/DL (ref 70–125)
HCT VFR BLD AUTO: 36.9 % (ref 35–47)
HGB BLD-MCNC: 12.2 G/DL (ref 11.7–15.7)
IMM GRANULOCYTES # BLD: 0 10E3/UL
IMM GRANULOCYTES NFR BLD: 0 %
LDH SERPL L TO P-CCNC: 287 U/L (ref 125–220)
LYMPHOCYTES # BLD AUTO: 1.4 10E3/UL (ref 0.8–5.3)
LYMPHOCYTES NFR BLD AUTO: 23 %
MCH RBC QN AUTO: 26.6 PG (ref 26.5–33)
MCHC RBC AUTO-ENTMCNC: 33.1 G/DL (ref 31.5–36.5)
MCV RBC AUTO: 81 FL (ref 78–100)
MONOCYTES # BLD AUTO: 0.6 10E3/UL (ref 0–1.3)
MONOCYTES NFR BLD AUTO: 10 %
NEUTROPHILS # BLD AUTO: 4 10E3/UL (ref 1.6–8.3)
NEUTROPHILS NFR BLD AUTO: 66 %
NRBC # BLD AUTO: 0 10E3/UL
NRBC BLD AUTO-RTO: 0 /100
PLATELET # BLD AUTO: 118 10E3/UL (ref 150–450)
POTASSIUM BLD-SCNC: 3.9 MMOL/L (ref 3.5–5)
PROT SERPL-MCNC: 6.6 G/DL (ref 6–8)
PROT/CREAT 24H UR: 0.17 MG/MG CR
RBC # BLD AUTO: 4.58 10E6/UL (ref 3.8–5.2)
SODIUM SERPL-SCNC: 136 MMOL/L (ref 136–145)
WBC # BLD AUTO: 6 10E3/UL (ref 4–11)

## 2023-08-23 PROCEDURE — 85025 COMPLETE CBC W/AUTO DIFF WBC: CPT

## 2023-08-23 PROCEDURE — 250N000011 HC RX IP 250 OP 636

## 2023-08-23 PROCEDURE — 83615 LACTATE (LD) (LDH) ENZYME: CPT

## 2023-08-23 PROCEDURE — 80053 COMPREHEN METABOLIC PANEL: CPT

## 2023-08-23 PROCEDURE — 83010 ASSAY OF HAPTOGLOBIN QUANT: CPT

## 2023-08-23 PROCEDURE — 84156 ASSAY OF PROTEIN URINE: CPT

## 2023-08-23 PROCEDURE — 36415 COLL VENOUS BLD VENIPUNCTURE: CPT

## 2023-08-23 RX ORDER — LIDOCAINE 40 MG/G
CREAM TOPICAL
Status: DISCONTINUED | OUTPATIENT
Start: 2023-08-23 | End: 2023-08-24 | Stop reason: HOSPADM

## 2023-08-23 RX ORDER — LABETALOL HYDROCHLORIDE 5 MG/ML
20-80 INJECTION, SOLUTION INTRAVENOUS EVERY 10 MIN PRN
Status: DISCONTINUED | OUTPATIENT
Start: 2023-08-23 | End: 2023-08-27 | Stop reason: HOSPADM

## 2023-08-23 RX ORDER — HYDRALAZINE HYDROCHLORIDE 20 MG/ML
10 INJECTION INTRAMUSCULAR; INTRAVENOUS
Status: DISCONTINUED | OUTPATIENT
Start: 2023-08-23 | End: 2023-08-27 | Stop reason: HOSPADM

## 2023-08-23 RX ADMIN — LABETALOL HYDROCHLORIDE 40 MG: 5 INJECTION, SOLUTION INTRAVENOUS at 22:47

## 2023-08-23 RX ADMIN — LABETALOL HYDROCHLORIDE 20 MG: 5 INJECTION, SOLUTION INTRAVENOUS at 22:26

## 2023-08-23 ASSESSMENT — ACTIVITIES OF DAILY LIVING (ADL)
DIFFICULTY_EATING/SWALLOWING: NO
FALL_HISTORY_WITHIN_LAST_SIX_MONTHS: NO
TOILETING_ISSUES: NO
ADLS_ACUITY_SCORE: 35
DRESSING/BATHING_DIFFICULTY: NO
VISION_MANAGEMENT: GLASSES
CHANGE_IN_FUNCTIONAL_STATUS_SINCE_ONSET_OF_CURRENT_ILLNESS/INJURY: NO
CONCENTRATING,_REMEMBERING_OR_MAKING_DECISIONS_DIFFICULTY: NO
DOING_ERRANDS_INDEPENDENTLY_DIFFICULTY: NO
WEAR_GLASSES_OR_BLIND: YES
WALKING_OR_CLIMBING_STAIRS_DIFFICULTY: NO
ADLS_ACUITY_SCORE: 20

## 2023-08-24 ENCOUNTER — ANESTHESIA (OUTPATIENT)
Dept: OBGYN | Facility: CLINIC | Age: 43
End: 2023-08-24
Payer: COMMERCIAL

## 2023-08-24 ENCOUNTER — MEDICAL CORRESPONDENCE (OUTPATIENT)
Dept: HEALTH INFORMATION MANAGEMENT | Facility: CLINIC | Age: 43
End: 2023-08-24

## 2023-08-24 ENCOUNTER — ANESTHESIA EVENT (OUTPATIENT)
Dept: OBGYN | Facility: CLINIC | Age: 43
End: 2023-08-24
Payer: COMMERCIAL

## 2023-08-24 PROBLEM — O16.9 HYPERTENSION COMPLICATING PREGNANCY: Status: ACTIVE | Noted: 2023-08-24

## 2023-08-24 LAB
ABO/RH(D): NORMAL
ALT SERPL W P-5'-P-CCNC: 21 U/L (ref 0–45)
ALT SERPL W P-5'-P-CCNC: 21 U/L (ref 0–45)
ANTIBODY SCREEN: NEGATIVE
APTT PPP: 25 SECONDS (ref 22–38)
AST SERPL W P-5'-P-CCNC: 25 U/L (ref 0–40)
CREAT SERPL-MCNC: 0.69 MG/DL (ref 0.6–1.1)
GFR SERPL CREATININE-BSD FRML MDRD: >90 ML/MIN/1.73M2
GLUCOSE BLDC GLUCOMTR-MCNC: 116 MG/DL (ref 70–99)
GLUCOSE BLDC GLUCOMTR-MCNC: 122 MG/DL (ref 70–99)
GLUCOSE BLDC GLUCOMTR-MCNC: 122 MG/DL (ref 70–99)
GLUCOSE BLDC GLUCOMTR-MCNC: 135 MG/DL (ref 70–99)
GLUCOSE BLDC GLUCOMTR-MCNC: 77 MG/DL (ref 70–99)
HAPTOGLOB SERPL-MCNC: 63 MG/DL (ref 32–197)
HGB BLD-MCNC: 13.2 G/DL (ref 11.7–15.7)
HOLD SPECIMEN: NORMAL
INR PPP: 0.88 (ref 0.85–1.15)
PLATELET # BLD AUTO: 117 10E3/UL (ref 150–450)
PLATELET # BLD AUTO: 119 10E3/UL (ref 150–450)
SPECIMEN EXPIRATION DATE: NORMAL
T PALLIDUM AB SER QL: NONREACTIVE

## 2023-08-24 PROCEDURE — 36415 COLL VENOUS BLD VENIPUNCTURE: CPT | Performed by: FAMILY MEDICINE

## 2023-08-24 PROCEDURE — 82565 ASSAY OF CREATININE: CPT | Performed by: FAMILY MEDICINE

## 2023-08-24 PROCEDURE — 85730 THROMBOPLASTIN TIME PARTIAL: CPT | Performed by: FAMILY MEDICINE

## 2023-08-24 PROCEDURE — 250N000013 HC RX MED GY IP 250 OP 250 PS 637: Performed by: FAMILY MEDICINE

## 2023-08-24 PROCEDURE — 85049 AUTOMATED PLATELET COUNT: CPT | Performed by: FAMILY MEDICINE

## 2023-08-24 PROCEDURE — 250N000011 HC RX IP 250 OP 636: Mod: JZ | Performed by: FAMILY MEDICINE

## 2023-08-24 PROCEDURE — 120N000001 HC R&B MED SURG/OB

## 2023-08-24 PROCEDURE — 250N000009 HC RX 250: Performed by: FAMILY MEDICINE

## 2023-08-24 PROCEDURE — 250N000013 HC RX MED GY IP 250 OP 250 PS 637

## 2023-08-24 PROCEDURE — 250N000011 HC RX IP 250 OP 636: Performed by: ANESTHESIOLOGY

## 2023-08-24 PROCEDURE — 86780 TREPONEMA PALLIDUM: CPT

## 2023-08-24 PROCEDURE — 36415 COLL VENOUS BLD VENIPUNCTURE: CPT

## 2023-08-24 PROCEDURE — 84460 ALANINE AMINO (ALT) (SGPT): CPT | Performed by: FAMILY MEDICINE

## 2023-08-24 PROCEDURE — 250N000011 HC RX IP 250 OP 636: Mod: JZ | Performed by: ANESTHESIOLOGY

## 2023-08-24 PROCEDURE — 370N000003 HC ANESTHESIA WARD SERVICE: Performed by: ANESTHESIOLOGY

## 2023-08-24 PROCEDURE — 86850 RBC ANTIBODY SCREEN: CPT

## 2023-08-24 PROCEDURE — 84450 TRANSFERASE (AST) (SGOT): CPT | Performed by: FAMILY MEDICINE

## 2023-08-24 PROCEDURE — 85018 HEMOGLOBIN: CPT | Performed by: FAMILY MEDICINE

## 2023-08-24 PROCEDURE — 258N000003 HC RX IP 258 OP 636: Performed by: FAMILY MEDICINE

## 2023-08-24 PROCEDURE — 250N000012 HC RX MED GY IP 250 OP 636 PS 637

## 2023-08-24 PROCEDURE — 86901 BLOOD TYPING SEROLOGIC RH(D): CPT

## 2023-08-24 PROCEDURE — 85610 PROTHROMBIN TIME: CPT | Performed by: FAMILY MEDICINE

## 2023-08-24 RX ORDER — MAGNESIUM SULFATE IN WATER 40 MG/ML
2 INJECTION, SOLUTION INTRAVENOUS CONTINUOUS
Status: DISCONTINUED | OUTPATIENT
Start: 2023-08-24 | End: 2023-08-27 | Stop reason: HOSPADM

## 2023-08-24 RX ORDER — CITRIC ACID/SODIUM CITRATE 334-500MG
30 SOLUTION, ORAL ORAL
Status: DISCONTINUED | OUTPATIENT
Start: 2023-08-24 | End: 2023-08-24

## 2023-08-24 RX ORDER — KETOROLAC TROMETHAMINE 30 MG/ML
30 INJECTION, SOLUTION INTRAMUSCULAR; INTRAVENOUS
Status: DISCONTINUED | OUTPATIENT
Start: 2023-08-24 | End: 2023-08-27 | Stop reason: HOSPADM

## 2023-08-24 RX ORDER — CALCIUM GLUCONATE 94 MG/ML
1 INJECTION, SOLUTION INTRAVENOUS
Status: DISCONTINUED | OUTPATIENT
Start: 2023-08-24 | End: 2023-08-27 | Stop reason: HOSPADM

## 2023-08-24 RX ORDER — ASPIRIN 81 MG/1
81 TABLET ORAL DAILY
Status: DISCONTINUED | OUTPATIENT
Start: 2023-08-24 | End: 2023-08-24

## 2023-08-24 RX ORDER — KETOROLAC TROMETHAMINE 30 MG/ML
30 INJECTION, SOLUTION INTRAMUSCULAR; INTRAVENOUS
Status: DISCONTINUED | OUTPATIENT
Start: 2023-08-24 | End: 2023-08-24

## 2023-08-24 RX ORDER — ONDANSETRON 4 MG/1
4 TABLET, ORALLY DISINTEGRATING ORAL EVERY 6 HOURS PRN
Status: DISCONTINUED | OUTPATIENT
Start: 2023-08-24 | End: 2023-08-25 | Stop reason: HOSPADM

## 2023-08-24 RX ORDER — ONDANSETRON 2 MG/ML
4 INJECTION INTRAMUSCULAR; INTRAVENOUS EVERY 6 HOURS PRN
Status: DISCONTINUED | OUTPATIENT
Start: 2023-08-24 | End: 2023-08-24

## 2023-08-24 RX ORDER — SODIUM CHLORIDE, SODIUM LACTATE, POTASSIUM CHLORIDE, CALCIUM CHLORIDE 600; 310; 30; 20 MG/100ML; MG/100ML; MG/100ML; MG/100ML
INJECTION, SOLUTION INTRAVENOUS CONTINUOUS PRN
Status: DISCONTINUED | OUTPATIENT
Start: 2023-08-24 | End: 2023-08-25 | Stop reason: HOSPADM

## 2023-08-24 RX ORDER — PENICILLIN G 3000000 [IU]/50ML
3 INJECTION, SOLUTION INTRAVENOUS EVERY 4 HOURS
Status: DISCONTINUED | OUTPATIENT
Start: 2023-08-24 | End: 2023-08-25 | Stop reason: HOSPADM

## 2023-08-24 RX ORDER — BUPIVACAINE HYDROCHLORIDE 2.5 MG/ML
INJECTION, SOLUTION EPIDURAL; INFILTRATION; INTRACAUDAL
Status: COMPLETED | OUTPATIENT
Start: 2023-08-24 | End: 2023-08-24

## 2023-08-24 RX ORDER — MISOPROSTOL 200 UG/1
800 TABLET ORAL
Status: DISCONTINUED | OUTPATIENT
Start: 2023-08-24 | End: 2023-08-24

## 2023-08-24 RX ORDER — CALCIUM CARBONATE 500 MG/1
1000 TABLET, CHEWABLE ORAL
Status: DISCONTINUED | OUTPATIENT
Start: 2023-08-24 | End: 2023-08-27 | Stop reason: HOSPADM

## 2023-08-24 RX ORDER — IBUPROFEN 800 MG/1
800 TABLET, FILM COATED ORAL
Status: DISCONTINUED | OUTPATIENT
Start: 2023-08-24 | End: 2023-08-27 | Stop reason: HOSPADM

## 2023-08-24 RX ORDER — NALBUPHINE HYDROCHLORIDE 20 MG/ML
2.5-5 INJECTION, SOLUTION INTRAMUSCULAR; INTRAVENOUS; SUBCUTANEOUS EVERY 6 HOURS PRN
Status: DISCONTINUED | OUTPATIENT
Start: 2023-08-24 | End: 2023-08-27 | Stop reason: HOSPADM

## 2023-08-24 RX ORDER — METHYLERGONOVINE MALEATE 0.2 MG/ML
200 INJECTION INTRAVENOUS
Status: DISCONTINUED | OUTPATIENT
Start: 2023-08-24 | End: 2023-08-25 | Stop reason: HOSPADM

## 2023-08-24 RX ORDER — PROCHLORPERAZINE MALEATE 10 MG
10 TABLET ORAL EVERY 6 HOURS PRN
Status: DISCONTINUED | OUTPATIENT
Start: 2023-08-24 | End: 2023-08-25 | Stop reason: HOSPADM

## 2023-08-24 RX ORDER — OXYTOCIN/0.9 % SODIUM CHLORIDE 30/500 ML
100-340 PLASTIC BAG, INJECTION (ML) INTRAVENOUS CONTINUOUS PRN
Status: DISCONTINUED | OUTPATIENT
Start: 2023-08-24 | End: 2023-08-24

## 2023-08-24 RX ORDER — OXYTOCIN 10 [USP'U]/ML
10 INJECTION, SOLUTION INTRAMUSCULAR; INTRAVENOUS
Status: DISCONTINUED | OUTPATIENT
Start: 2023-08-24 | End: 2023-08-25 | Stop reason: HOSPADM

## 2023-08-24 RX ORDER — METOCLOPRAMIDE 10 MG/1
10 TABLET ORAL EVERY 6 HOURS PRN
Status: DISCONTINUED | OUTPATIENT
Start: 2023-08-24 | End: 2023-08-24

## 2023-08-24 RX ORDER — ACETAMINOPHEN 325 MG/1
650 TABLET ORAL EVERY 4 HOURS PRN
Status: DISCONTINUED | OUTPATIENT
Start: 2023-08-24 | End: 2023-08-25 | Stop reason: HOSPADM

## 2023-08-24 RX ORDER — MAGNESIUM SULFATE HEPTAHYDRATE 40 MG/ML
2 INJECTION, SOLUTION INTRAVENOUS
Status: DISCONTINUED | OUTPATIENT
Start: 2023-08-24 | End: 2023-08-27 | Stop reason: HOSPADM

## 2023-08-24 RX ORDER — NALOXONE HYDROCHLORIDE 0.4 MG/ML
0.2 INJECTION, SOLUTION INTRAMUSCULAR; INTRAVENOUS; SUBCUTANEOUS
Status: DISCONTINUED | OUTPATIENT
Start: 2023-08-24 | End: 2023-08-25 | Stop reason: HOSPADM

## 2023-08-24 RX ORDER — MISOPROSTOL 200 UG/1
400 TABLET ORAL
Status: DISCONTINUED | OUTPATIENT
Start: 2023-08-24 | End: 2023-08-25 | Stop reason: HOSPADM

## 2023-08-24 RX ORDER — NALOXONE HYDROCHLORIDE 0.4 MG/ML
0.4 INJECTION, SOLUTION INTRAMUSCULAR; INTRAVENOUS; SUBCUTANEOUS
Status: DISCONTINUED | OUTPATIENT
Start: 2023-08-24 | End: 2023-08-25 | Stop reason: HOSPADM

## 2023-08-24 RX ORDER — ONDANSETRON 2 MG/ML
4 INJECTION INTRAMUSCULAR; INTRAVENOUS EVERY 6 HOURS PRN
Status: DISCONTINUED | OUTPATIENT
Start: 2023-08-24 | End: 2023-08-25 | Stop reason: HOSPADM

## 2023-08-24 RX ORDER — OXYTOCIN/0.9 % SODIUM CHLORIDE 30/500 ML
340 PLASTIC BAG, INJECTION (ML) INTRAVENOUS CONTINUOUS PRN
Status: DISCONTINUED | OUTPATIENT
Start: 2023-08-24 | End: 2023-08-25 | Stop reason: HOSPADM

## 2023-08-24 RX ORDER — NALOXONE HYDROCHLORIDE 0.4 MG/ML
0.2 INJECTION, SOLUTION INTRAMUSCULAR; INTRAVENOUS; SUBCUTANEOUS
Status: DISCONTINUED | OUTPATIENT
Start: 2023-08-24 | End: 2023-08-24

## 2023-08-24 RX ORDER — PROCHLORPERAZINE MALEATE 10 MG
10 TABLET ORAL EVERY 6 HOURS PRN
Status: DISCONTINUED | OUTPATIENT
Start: 2023-08-24 | End: 2023-08-24

## 2023-08-24 RX ORDER — MISOPROSTOL 200 UG/1
400 TABLET ORAL
Status: DISCONTINUED | OUTPATIENT
Start: 2023-08-24 | End: 2023-08-24

## 2023-08-24 RX ORDER — PENICILLIN G POTASSIUM 5000000 [IU]/1
5 INJECTION, POWDER, FOR SOLUTION INTRAMUSCULAR; INTRAVENOUS ONCE
Status: COMPLETED | OUTPATIENT
Start: 2023-08-24 | End: 2023-08-24

## 2023-08-24 RX ORDER — CITRIC ACID/SODIUM CITRATE 334-500MG
30 SOLUTION, ORAL ORAL
Status: DISCONTINUED | OUTPATIENT
Start: 2023-08-24 | End: 2023-08-25 | Stop reason: HOSPADM

## 2023-08-24 RX ORDER — MAGNESIUM SULFATE 4 G/50ML
4 INJECTION INTRAVENOUS ONCE
Status: COMPLETED | OUTPATIENT
Start: 2023-08-24 | End: 2023-08-24

## 2023-08-24 RX ORDER — ONDANSETRON 4 MG/1
4 TABLET, ORALLY DISINTEGRATING ORAL EVERY 6 HOURS PRN
Status: DISCONTINUED | OUTPATIENT
Start: 2023-08-24 | End: 2023-08-24

## 2023-08-24 RX ORDER — MAGNESIUM SULFATE 4 G/50ML
4 INJECTION INTRAVENOUS
Status: DISCONTINUED | OUTPATIENT
Start: 2023-08-24 | End: 2023-08-27 | Stop reason: HOSPADM

## 2023-08-24 RX ORDER — PROCHLORPERAZINE 25 MG
25 SUPPOSITORY, RECTAL RECTAL EVERY 12 HOURS PRN
Status: DISCONTINUED | OUTPATIENT
Start: 2023-08-24 | End: 2023-08-25 | Stop reason: HOSPADM

## 2023-08-24 RX ORDER — AZATHIOPRINE 50 MG/1
50 TABLET ORAL DAILY
Status: DISCONTINUED | OUTPATIENT
Start: 2023-08-24 | End: 2023-08-27 | Stop reason: HOSPADM

## 2023-08-24 RX ORDER — METHYLERGONOVINE MALEATE 0.2 MG/ML
200 INJECTION INTRAVENOUS
Status: DISCONTINUED | OUTPATIENT
Start: 2023-08-24 | End: 2023-08-24

## 2023-08-24 RX ORDER — FENTANYL CITRATE 50 UG/ML
50 INJECTION, SOLUTION INTRAMUSCULAR; INTRAVENOUS EVERY 30 MIN PRN
Status: DISCONTINUED | OUTPATIENT
Start: 2023-08-24 | End: 2023-08-25 | Stop reason: HOSPADM

## 2023-08-24 RX ORDER — METOCLOPRAMIDE HYDROCHLORIDE 5 MG/ML
10 INJECTION INTRAMUSCULAR; INTRAVENOUS EVERY 6 HOURS PRN
Status: DISCONTINUED | OUTPATIENT
Start: 2023-08-24 | End: 2023-08-24

## 2023-08-24 RX ORDER — MISOPROSTOL 200 UG/1
800 TABLET ORAL
Status: DISCONTINUED | OUTPATIENT
Start: 2023-08-24 | End: 2023-08-25 | Stop reason: HOSPADM

## 2023-08-24 RX ORDER — LIDOCAINE 40 MG/G
CREAM TOPICAL
Status: DISCONTINUED | OUTPATIENT
Start: 2023-08-24 | End: 2023-08-24

## 2023-08-24 RX ORDER — NALOXONE HYDROCHLORIDE 0.4 MG/ML
0.4 INJECTION, SOLUTION INTRAMUSCULAR; INTRAVENOUS; SUBCUTANEOUS
Status: DISCONTINUED | OUTPATIENT
Start: 2023-08-24 | End: 2023-08-24

## 2023-08-24 RX ORDER — HYDROXYZINE HYDROCHLORIDE 25 MG/1
50 TABLET, FILM COATED ORAL
Status: DISPENSED | OUTPATIENT
Start: 2023-08-24 | End: 2023-08-25

## 2023-08-24 RX ORDER — PROCHLORPERAZINE 25 MG
25 SUPPOSITORY, RECTAL RECTAL EVERY 12 HOURS PRN
Status: DISCONTINUED | OUTPATIENT
Start: 2023-08-24 | End: 2023-08-24

## 2023-08-24 RX ORDER — CARBOPROST TROMETHAMINE 250 UG/ML
250 INJECTION, SOLUTION INTRAMUSCULAR
Status: DISCONTINUED | OUTPATIENT
Start: 2023-08-24 | End: 2023-08-24

## 2023-08-24 RX ORDER — OXYTOCIN 10 [USP'U]/ML
10 INJECTION, SOLUTION INTRAMUSCULAR; INTRAVENOUS
Status: DISCONTINUED | OUTPATIENT
Start: 2023-08-24 | End: 2023-08-27 | Stop reason: HOSPADM

## 2023-08-24 RX ORDER — OXYTOCIN/0.9 % SODIUM CHLORIDE 30/500 ML
100-340 PLASTIC BAG, INJECTION (ML) INTRAVENOUS CONTINUOUS PRN
Status: DISCONTINUED | OUTPATIENT
Start: 2023-08-24 | End: 2023-08-27 | Stop reason: HOSPADM

## 2023-08-24 RX ORDER — METOCLOPRAMIDE 10 MG/1
10 TABLET ORAL EVERY 6 HOURS PRN
Status: DISCONTINUED | OUTPATIENT
Start: 2023-08-24 | End: 2023-08-25 | Stop reason: HOSPADM

## 2023-08-24 RX ORDER — IBUPROFEN 800 MG/1
800 TABLET, FILM COATED ORAL
Status: DISCONTINUED | OUTPATIENT
Start: 2023-08-24 | End: 2023-08-24

## 2023-08-24 RX ORDER — LIDOCAINE 40 MG/G
CREAM TOPICAL
Status: DISCONTINUED | OUTPATIENT
Start: 2023-08-24 | End: 2023-08-25 | Stop reason: HOSPADM

## 2023-08-24 RX ORDER — EPHEDRINE SULFATE 50 MG/ML
5 INJECTION, SOLUTION INTRAMUSCULAR; INTRAVENOUS; SUBCUTANEOUS
Status: DISCONTINUED | OUTPATIENT
Start: 2023-08-24 | End: 2023-08-25 | Stop reason: HOSPADM

## 2023-08-24 RX ORDER — OXYTOCIN/0.9 % SODIUM CHLORIDE 30/500 ML
1-24 PLASTIC BAG, INJECTION (ML) INTRAVENOUS CONTINUOUS
Status: DISCONTINUED | OUTPATIENT
Start: 2023-08-24 | End: 2023-08-25 | Stop reason: HOSPADM

## 2023-08-24 RX ORDER — OXYTOCIN 10 [USP'U]/ML
10 INJECTION, SOLUTION INTRAMUSCULAR; INTRAVENOUS
Status: DISCONTINUED | OUTPATIENT
Start: 2023-08-24 | End: 2023-08-24

## 2023-08-24 RX ORDER — DEXTROSE MONOHYDRATE 25 G/50ML
25-50 INJECTION, SOLUTION INTRAVENOUS
Status: DISCONTINUED | OUTPATIENT
Start: 2023-08-24 | End: 2023-08-25 | Stop reason: HOSPADM

## 2023-08-24 RX ORDER — NICOTINE POLACRILEX 4 MG
15-30 LOZENGE BUCCAL
Status: DISCONTINUED | OUTPATIENT
Start: 2023-08-24 | End: 2023-08-25 | Stop reason: HOSPADM

## 2023-08-24 RX ORDER — METOCLOPRAMIDE HYDROCHLORIDE 5 MG/ML
10 INJECTION INTRAMUSCULAR; INTRAVENOUS EVERY 6 HOURS PRN
Status: DISCONTINUED | OUTPATIENT
Start: 2023-08-24 | End: 2023-08-25 | Stop reason: HOSPADM

## 2023-08-24 RX ORDER — FENTANYL/ROPIVACAINE/NS/PF 2MCG/ML-.1
PLASTIC BAG, INJECTION (ML) EPIDURAL
Status: DISCONTINUED | OUTPATIENT
Start: 2023-08-24 | End: 2023-08-25 | Stop reason: HOSPADM

## 2023-08-24 RX ORDER — CARBOPROST TROMETHAMINE 250 UG/ML
250 INJECTION, SOLUTION INTRAMUSCULAR
Status: DISCONTINUED | OUTPATIENT
Start: 2023-08-24 | End: 2023-08-25 | Stop reason: HOSPADM

## 2023-08-24 RX ADMIN — ACETAMINOPHEN 650 MG: 325 TABLET ORAL at 00:26

## 2023-08-24 RX ADMIN — PENICILLIN G POTASSIUM 5 MILLION UNITS: 5000000 POWDER, FOR SOLUTION INTRAMUSCULAR; INTRAPLEURAL; INTRATHECAL; INTRAVENOUS at 12:36

## 2023-08-24 RX ADMIN — CALCIUM CARBONATE (ANTACID) CHEW TAB 500 MG 1000 MG: 500 CHEW TAB at 17:17

## 2023-08-24 RX ADMIN — Medication 2 MILLI-UNITS/MIN: at 08:56

## 2023-08-24 RX ADMIN — PENICILLIN G 3 MILLION UNITS: 3000000 INJECTION, SOLUTION INTRAVENOUS at 20:37

## 2023-08-24 RX ADMIN — BUPIVACAINE HYDROCHLORIDE 6 ML: 2.5 INJECTION, SOLUTION EPIDURAL; INFILTRATION; INTRACAUDAL at 16:20

## 2023-08-24 RX ADMIN — MAGNESIUM SULFATE HEPTAHYDRATE 4 G: 80 INJECTION, SOLUTION INTRAVENOUS at 09:09

## 2023-08-24 RX ADMIN — Medication: at 16:23

## 2023-08-24 RX ADMIN — SODIUM CHLORIDE, POTASSIUM CHLORIDE, SODIUM LACTATE AND CALCIUM CHLORIDE: 600; 310; 30; 20 INJECTION, SOLUTION INTRAVENOUS at 08:52

## 2023-08-24 RX ADMIN — HYDROXYCHLOROQUINE SULFATE 300 MG: 200 TABLET ORAL at 11:00

## 2023-08-24 RX ADMIN — HYDROXYZINE HYDROCHLORIDE 50 MG: 25 TABLET ORAL at 00:27

## 2023-08-24 RX ADMIN — AZATHIOPRINE 50 MG: 50 TABLET ORAL at 11:00

## 2023-08-24 RX ADMIN — SODIUM CHLORIDE, POTASSIUM CHLORIDE, SODIUM LACTATE AND CALCIUM CHLORIDE 500 ML: 600; 310; 30; 20 INJECTION, SOLUTION INTRAVENOUS at 14:30

## 2023-08-24 RX ADMIN — MAGNESIUM SULFATE IN WATER 2 G/HR: 40 INJECTION, SOLUTION INTRAVENOUS at 09:39

## 2023-08-24 RX ADMIN — PENICILLIN G 3 MILLION UNITS: 3000000 INJECTION, SOLUTION INTRAVENOUS at 16:37

## 2023-08-24 ASSESSMENT — ACTIVITIES OF DAILY LIVING (ADL)
ADLS_ACUITY_SCORE: 20

## 2023-08-24 NOTE — PLAN OF CARE
Problem: Labor  Goal: Effective Progression to Delivery  Outcome: Progressing     Problem: Hypertensive Disorders in Pregnancy  Goal: Maternal-Fetal Stabilization  Outcome: Progressing   Goal Outcome Evaluation:             Vital signs stable, blood pressures remain WNL. Pt resting comfortably overnight, sleeping between cares. Pt endorses occasional contraction pain. SVE 2.5/60/-2, christianson 6. OB resident updated.

## 2023-08-24 NOTE — PROGRESS NOTES
Data: Patient presented to [facility]: 2023  8:35 PM.  Reason for maternal/fetal assessment is uterine contractions. Patient reports contractions starting at 0200. Patient denies leaking of vaginal fluid/rupture of membranes, vaginal bleeding, nausea, vomiting, headache, visual disturbances, epigastric or RUQ pain, significant edema. Patient reports fetal movement is normal. Patient is a 37w6d . Prenatal record reviewed. Pregnancy has been complicated by diabetes - gestational, diet controlled. Support person is not present.     Fetal HR baseline was 130, variability is moderate (amplitude range 6 to 25 bpm). Accelerations: increase 15 bpm above baseline lasting 15 seconds. Decelerations: absent. Uterine assessment is mild by palpation during contractions and soft by palpation at rest. Cervix 1 cm dilated and 50% effaced. Fetal station -3. Fetal presentation   per cervical exam. Membranes: intact.    Vital signs  BP elevated, repeated q15 minutes . Patient reports pain 7/10 during contractions and is coping.     Action: Verbal consent for EFM. Triage assessment completed. Patient does not meet criteria for early labor discharge.     Response: Patient verbalized agreement with plan. Will contact OB Resident and Dr. Mckenna with update and for further orders.  JEANNIE MANTILLA RN on 2023 at 22:10     Patient admitted to labor and delivery due to elevated blood pressures. Given two doses of Labetalol in triage to treat severe BPs. Report given to HARVINDER Coleman RN.

## 2023-08-24 NOTE — PROVIDER NOTIFICATION
Dr. Mckenna updated on patient status;      Patient received epidural at 1611.   SROM clear moderate fluid at 1630  Pitocin increased to 22 at 1640.  SVE unchanged from previous exam at 1645.  2nd dose of Penicillin running.     Severe range BP x1 noted while sitting up for her epidural 167/93 (RN did not treat due to falling Bps with epidural). Latest /60.    Oncoming RN to call with SVE shortly after change of shift.

## 2023-08-24 NOTE — PLAN OF CARE
Problem: Labor  Goal: Stable Fetal Wellbeing  Outcome: Progressing  Intervention: Promote and Monitor Fetal Wellbeing    Problem: Labor  Goal: Effective Progression to Delivery  Outcome: Progressing     Problem: Labor  Goal: Absence of Infection Signs and Symptoms  Outcome: Progressing

## 2023-08-24 NOTE — H&P
[facility] Labor and Delivery History and Physical    Earnestine Guillen MRN# [id number]   Age: 42 year old YOB: 1980     Date of Admission:  2023    Primary care provider: Rebecca Mckenna           Chief Complaint:   Earnestine Guillen is a 42 year old female who is 37w6d pregnant and being admitted for pre-eclampsia with severe features. She has a current prenatal history significant for AMA, Sjogrens (+SSA/SSB), recurrent miscarriages, hx GDMA1 with pregestational prediabetes (A1c 5.7% at 7weeks) , hx  birth      Presented to L&D late evening of  for with regular ctx q10 minutes. Membranes intact. GBS positive. Category 1 strip. [place] q10 minutes. SVE 50/-3. Bps have been elevated since arrival with highest of 174/100 however some elevated readings occurred during ctx. She did have a mild headache which resolved over time.   In clinic 2 days prior her platelets were noted to be slightly low at 130k. Of note, she had been on ASA during pregnancy for preE prevention. Holding ASA dose here in hospital.     2023  2226: 20mg IV labetalol for severe range BP in triage  2247: 40mg IV labetalol for severe range BP in triage  Since then BPs have returned to 130-140s systolic range.   Pt desired to rest overnight and with q10min ctx at the time we allowed her body some time to see if she progressed on her own.       2023  0700: 2.5/60%/-2    Bishops score of 6, recommendations to start pitocin  0900: IV Mg loading dose started  1230: 1st dose PCN given  1430:   4.5/70/-1     Requested labor epidural; IVFs given     Denies RUQ pain, vision changes, swelling, SOB, CP, emesis, urinary or GI symptoms.           Pregnancy history:     OBSTETRIC HISTORY:    OB History    Para Term  AB Living   7 3 2 1 3 3   SAB IAB Ectopic Multiple Live Births   3 0 0 0 3      # Outcome Date GA Lbr Deon/2nd Weight Sex Delivery Anes PTL Lv   7 Current            6  2022           5 2022           4 2021           3  20 34w3d 03:47 / 00:03 2.296 kg (5 lb 1 oz) M Vag-Spont Nitrous Y JENN      Name: JIMMY SOLIS      Apgar1: 8  Apgar5: 9   2 Term 03 40w0d  3.175 kg (7 lb) M Vag-Spont  N JENN   1 Term 00 38w0d  2.722 kg (6 lb) F Vag-Spont  N JENN      Obstetric Comments   Miscarriages- all within 1st trimester:    2021   2022   May 2022       EDC: Estimated Date of Delivery: 23    Prenatal Labs:   Lab Results   Component Value Date    AS Negative 2023    HEPBANG Nonreactive 2023    GCPCRT Negative 2018    HGB 12.2 2023       GBS Status:  POSITIVE    Active Problem List  Patient Active Problem List   Diagnosis    Abnormal LFTs s/t autoimmune hepatitis    ASCUS with positive high risk HPV cervical    Hepatitis, autoimmune (H)    Migraine with aura and without status migrainosus, not intractable    Need for hepatitis B vaccination    Positive EMELIA (antinuclear antibody)    SS-A and SS-B antibody positive    Sjogren's syndrome (H)iwth ++ SSA/SSB    Diet controlled gestational diabetes mellitus (GDM) in third trimester     premature rupture of membranes (PPROM) at 34w2d     (normal spontaneous vaginal delivery)    Cervical cancer screening    Encounter for triage in pregnant patient    Hypertension complicating pregnancy       Medication Prior to Admission  Medications Prior to Admission   Medication Sig Dispense Refill Last Dose    aspirin (ASA) 81 MG EC tablet Take 1 tablet (81 mg) by mouth daily 90 tablet 1 2023 at 0800    azaTHIOprine (IMURAN) 50 MG tablet Take 50 mg by mouth daily   2023 at 0800    hydroxychloroquine (PLAQUENIL) 200 MG tablet TAKE 1 & 1/2 TABLETS (300 MG) BY MOUTH ONCE DAILY.   2023 at 0800    Prenatal Vit-Fe Fumarate-FA (PRENATAL MULTIVITAMIN W/IRON) 27-0.8 MG tablet Take 1 tablet by mouth daily   2023 at 0800    Acetone, Urine, Test (KETONE TEST)  STRP 1 each by In Vitro route daily 50 strip 3     blood glucose (NO BRAND SPECIFIED) lancets standard Use to test blood sugar 4 times daily or as directed. 400 each 1     blood glucose (NO BRAND SPECIFIED) test strip Use to test blood sugar 4 times daily or as directed. 400 strip 1     blood glucose monitoring (ONETOUCH VERIO) meter device kit Use to test blood sugar 4-6 times daily or as directed. 1 kit 0     spacer (OPTICHAMBER SHELBY) holding chamber FOR HOME USE.       SUMAtriptan (IMITREX) 100 MG tablet Take 1 tablet (100 mg) by mouth at onset of headache for migraine 10 tablet 11    .        Maternal Past Medical History:     Past Medical History:   Diagnosis Date    Acute pyelonephritis     Anemia     Autoimmune hepatitis (H) 01/01/2018    Diabetes mellitus (H)     DGDM    Migraine     PID (acute pelvic inflammatory disease) 01/01/2018    Sjogren's syndrome (H)     SS-A antibody positive     SS-B antibody positive                        Family History:   I have reviewed this patient's family history            Social History:     Social History     Social History Narrative    , 3 children    Non smoker    Rebecca Mckenna MD              Review of Systems:   CONSTITUTIONAL: NEGATIVE for fever, chills, change in weight  ENT/MOUTH: NEGATIVE for ear, mouth and throat problems  RESP: NEGATIVE for significant cough or SOB  CV: NEGATIVE for chest pain, palpitations or peripheral edema          Physical Exam:   Vitals were reviewed  Patient Vitals for the past 24 hrs:   BP Temp Temp src Pulse Resp SpO2 Height Weight   08/24/23 1400 139/82 98.6  F (37  C) Oral -- -- 97 % -- --   08/24/23 1300 125/76 -- -- -- -- 97 % -- --   08/24/23 1200 130/73 98.7  F (37.1  C) Oral -- 16 96 % -- --   08/24/23 1100 135/79 -- -- -- 16 97 % -- --   08/24/23 1010 132/76 -- -- -- 16 96 % -- --   08/24/23 0955 137/69 -- -- -- 16 96 % -- --   08/24/23 0940 121/69 -- -- -- 16 95 % -- --   08/24/23 0920 110/64 -- -- -- 18 95 % --  --   08/24/23 0900 (!) 141/84 98.1  F (36.7  C) Oral -- 18 97 % -- --   08/24/23 0730 (!) 143/76 -- -- -- -- -- -- --   08/24/23 0625 (!) 144/83 -- -- -- -- -- -- --   08/24/23 0531 114/70 -- -- -- -- -- -- --   08/24/23 0425 103/73 98.1  F (36.7  C) Oral -- 18 98 % -- --   08/24/23 0323 124/64 -- -- -- -- -- -- --   08/24/23 0308 109/62 -- -- -- -- -- -- --   08/24/23 0233 114/78 -- -- -- -- -- -- --   08/24/23 0203 104/67 -- -- -- -- -- -- --   08/24/23 0125 108/67 -- -- -- -- -- -- --   08/24/23 0109 137/82 -- -- -- -- -- -- --   08/24/23 0054 137/85 -- -- -- -- -- -- --   08/24/23 0039 118/76 -- -- -- -- -- -- --   08/24/23 0024 127/82 97.8  F (36.6  C) Oral -- 16 -- -- --   08/24/23 0006 115/73 -- -- -- -- -- -- --   08/23/23 2356 121/77 -- -- -- -- -- -- --   08/23/23 2346 121/77 -- -- -- -- -- -- --   08/23/23 2336 134/83 -- -- -- -- -- -- --   08/23/23 2326 131/87 -- -- -- -- -- -- --   08/23/23 2316 127/83 -- -- -- -- -- -- --   08/23/23 2307 (!) 156/83 -- -- -- -- -- -- --   08/23/23 2253 126/81 -- -- -- -- -- -- --   08/23/23 2243 (!) 179/93 -- -- -- -- -- -- --   08/23/23 2206 (!) 156/99 -- -- -- -- -- -- --   08/23/23 2151 (!) 154/93 -- -- -- -- -- -- --   08/23/23 2136 (!) 174/100 -- -- -- -- -- -- --   08/23/23 2121 (!) 143/92 -- -- -- -- -- -- --   08/23/23 2105 (!) 150/83 98.7  F (37.1  C) Oral 73 18 -- 1.524 m (5') 69.9 kg (154 lb)     GEN: Awake, alert in no apparent distress   HEENT: grossly normal  RESPIRATORY: clear to auscultation bilaterally, no increased work of breathing  BACK:  no costovertebral angle tenderness   CARDIOVASCULAR: RRR, no murmur  ABDOMEN: gravid, bowel sounds present. Nontender  PELVIC:  no fluid noted, no blood noted.  Presenting part: head.  Cervix: 4.5/70%/-1 at 1430  EXT:  no edema or calf tenderness    Presentation:Cephalic  Fetal Heart Rate Tracing: reactive and reassuring, Tier 1 (normal)  Tocometer: external monitor                       Assessment:   Earnestine AMIN  Wilmer is a 37w6d pregnant female admitted for pre-eclampsia with severe features. She has a current prenatal history significant for AMA, Sjogrens (+SSA/SSB), recurrent miscarriages, hx GDMA1 with pregestational prediabetes (A1c 5.7% at 7weeks), hx autoimmune hepatitis (Alkaline phosphatase elevated this pregnancy but LFTs normalized), hx  birth and GBS positive. She has been followed by myself and M for regular care and monitoring for these conditions.     BPs have improved to 130s systolic s/p 2 doses IV labetalol early on in triage last night.  HELLP labs generally reassuring: AST/ALT normal, LDH 200s (not >600), and platelets low at 118 on admission (from 130 on ).         Plan:   Admit - see IP orders    PreEclampsia w/ severe features - severe range BP and thrombocytopenia but not meeting criteria for HELLP  - IV labetalol orders on hand/prn  - Hold ASA at this time due to thrombocytopenia  - Start IV Mg 4g loading dose followed by 2g maintenance  - labs and RN checks per orders    Labor induction with Pitocin    Pain medication: Desires labor epidural, recheck plts improved to 119k, checking PTT/INR    Prophylactic antibiotic for + GBS status- 1st dose at 1230    Diabetes monitoring per protocol for antepartum and transition to intrapartum when in active phase of labor    Anticipate     Expecting baby boy Rodney, post riaz EKG needed.EFW 73%ile       Rebecca Mckenna MD

## 2023-08-24 NOTE — ANESTHESIA PROCEDURE NOTES
"Epidural catheter Procedure Note    Pre-Procedure   Staff -        Anesthesiologist:  Sebastian Guevara MD       Performed By: anesthesiologist       Location: OB       Procedure Start/Stop Times: 8/24/2023 4:09 PM and 8/24/2023 4:25 PM       Pre-Anesthestic Checklist: patient identified, IV checked, risks and benefits discussed, informed consent, monitors and equipment checked, pre-op evaluation, at physician/surgeon's request and post-op pain management  Timeout:       Correct Patient: Yes        Correct Procedure: Yes        Correct Site: Yes        Correct Position: Yes   Procedure Documentation  Procedure: epidural catheter       Diagnosis: labor pain       Patient Position: sitting       Skin prep: Chloraprep       Local skin infiltrated with mL of 1% lidocaine.        Insertion Site: L3-4. (midline approach).       Technique: LORT air        Needle type: [employer].       Needle Gauge: 18.        Needle Length (Inches): 3.5        Catheter: 20 G.          Catheter threaded easily.             # of attempts: 1 and  # of redirects:  0    Assessment/Narrative         Paresthesias: No.       Test dose of 3 mL lidocaine 1.5% w/ 1:200,000 epinephrine at 16:18 CDT.         Test dose negative, 3 minutes after injection, for signs of intravascular, subdural, or intrathecal injection.       Insertion/Infusion Method: LORT air       Aspiration negative for Heme or CSF via Epidural Catheter.    Medication(s) Administered   0.25% Bupivacaine PF (Epidural) - EPIDURAL   6 mL - 8/24/2023 4:20:00 PM  Medication Administration Time: 8/24/2023 4:09 PM      FOR [facility] ([facility]) ONLY:   Pain Team Contact information: please page the Pain Team Via [employer]. Search \"Pain\". During daytime hours, please page the attending first. At night please page the resident first.      "

## 2023-08-24 NOTE — PROGRESS NOTES
OB Triage Note        Assessment and Plan:     Earnestine Guillen is a 42 year old  at 37w5d not in labor. Patient presenting with regular ctx q10 minutes. Membranes intact. GBS positive. Category 1 strip. [place] q10 minutes. SVE 50/-3. Bps have been elevated since arrival with highest of 174/100 however some elevated readings occurred during ctx. Will order HELLP/preeclampsia labs. Patient meets admission criteria based on her BP readings in the context of her high risk pregnancy history, she is symptomatic (headache), and given prior labs indicative of thrombocytopenia. Will trend Bps and treat with labetalol. Patient may need to be moved towards delivery with cervical ripening agent if she meets criteria for preeclampsia.   Patient Active Problem List   Diagnosis    Abnormal LFTs s/t autoimmune hepatitis    ASCUS with positive high risk HPV cervical    Hepatitis, autoimmune (H)    Migraine with aura and without status migrainosus, not intractable    Need for hepatitis B vaccination    Positive EMELIA (antinuclear antibody)    SS-A and SS-B antibody positive    Sjogren's syndrome (H)iwth ++ SSA/SSB    Diet controlled gestational diabetes mellitus (GDM) in third trimester     premature rupture of membranes (PPROM) at 34w2d     (normal spontaneous vaginal delivery)    Cervical cancer screening    Encounter for triage in pregnant patient        CBC, CMP, random urine protein/Cr ratio   OB hypertensive protocol initiated   Will admit - see IP orders    Patient discussed with attending physician, Dr. Mckenna, who agrees with the plan.      Frandy Oates MD PGY1 2023  [facility]       Subjective:     Earnestine Guillen is a  42 year old female at 37w5d with KRISHNA 23, but to deliver at 39 weeks due to pre gestational prediabetes. She has  a current prenatal history significant for AMA, Sjogrens (+SSA/SSB), recurrent miscarriages, hx  GDMA1 with pregestational prediabetes (A1c 5.7% at 7weeks) , hx  birth who presents to OB triage with contractions.    Earnestine Guillen is a patient of Rebecca Verdugo from [facility].     She reports regular contractions starting around 0200 on  and occurring every 10 minutes. She denies fluid leakage. She denies bleeding per vagina. Fetal movement is normal.  Estimated Date of Delivery: Sep 8, 2023 Patient's last menstrual period was 2022 (exact date).     Endorses headache. Patient with hx of migraines. Has not taken tylenol for it she said it is getting better on its own. Located in temples. States it started today and was worse with the heat outside.   Denies RUQ pain, vision changes, swelling, SOB, CP, emesis, urinary or GI symptoms.     Prenatal labs:   Lab Results   Component Value Date    AS Negative 2023    HEPBANG Nonreactive 2023    GCPCRT Negative 2018    HGB 13.2 2023          Review of Systems:   CONSTITUTIONAL: no fatigue, no unexpected change in weight  SKIN: no worrisome rashes or lesions  EYES: no acute vision problems or changes  ENT: no ear problems, no mouth problems, no throat problems  RESP: no significant cough, no shortness of breath  CV: no chest pain, no palpitations, no new or worsening peripheral edema  GI: no nausea, no vomiting, no constipation, no diarrhea  : no frequency, no dysuria, no hematuria  NEURO: headache  ENDOCRINE: no temperature intolerance, no skin/hair changes  PSYCHIATRIC: NEGATIVE for changes in mood or trouble with sleep         Physical Exam:   Vitals:   BP (!) 150/83 (BP Location: Right arm, Patient Position: Semi-Marie's, Cuff Size: Adult Regular)   Pulse 73   Temp 98.7  F (37.1  C) (Oral)   Resp 18   Ht 1.524 m (5')   Wt 69.9 kg (154 lb)   LMP 2022 (Exact Date)   BMI 30.08 kg/m    154 lbs 0 oz  Estimated body mass index is 30.08 kg/m  as calculated from the  following:    Height as of this encounter: 1.524 m (5').    Weight as of this encounter: 69.9 kg (154 lb).    GEN: Awake, alert in no apparent distress   HEENT: grossly normal  RESPIRATORY: clear to auscultation bilaterally, no increased work of breathing  BACK:  no costovertebral angle tenderness   CARDIOVASCULAR: RRR, no murmur  ABDOMEN: gravid, bowel sounds present. Nontender  PELVIC:  no fluid noted, no blood noted.  Presenting part: head.  Cervix: 1/50/-3/thick/posterior  EXT:  no edema or calf tenderness    NST interpretation:  Baseline rate 140 normal  Accelerations present  Decelerations not present  Interpretation: reactive    Labs today:  No results found for any visits on 08/23/23.

## 2023-08-24 NOTE — PROGRESS NOTES
Per OB resident and Dr. Mckenna-    Plan to observe patient overnight. Will start magnesium if blood pressures return to severe range. Plan for cervical exam in the morning to determine need for cervical ripening/augmentation unless pt starts to contract more frequently. Will start penicillin as labor progresses.

## 2023-08-24 NOTE — ANESTHESIA PREPROCEDURE EVALUATION
Anesthesia Pre-Procedure Evaluation    Patient: Earnestine Guillen   MRN: [id number] : 1980        Procedure :           Past Medical History:   Diagnosis Date    Acute pyelonephritis     Anemia     Autoimmune hepatitis (H) 2018    Diabetes mellitus (H)     DGDM    Migraine     PID (acute pelvic inflammatory disease) 2018    Sjogren's syndrome (H)     SS-A antibody positive     SS-B antibody positive       Past Surgical History:   Procedure Laterality Date    INJECTION ANESTHETIC AGENT TO CERVICAL PLEXUS        Allergies   Allergen Reactions    Methocarbamol Rash     Could be an allergy with this or propranolol    Propranolol Rash     Could be an allergy with this or methocarbamol    Venlafaxine Rash      Social History     Tobacco Use    Smoking status: Never     Passive exposure: Never    Smokeless tobacco: Never   Substance Use Topics    Alcohol use: Not Currently      Wt Readings from Last 1 Encounters:   23 69.9 kg (154 lb)        Anesthesia Evaluation            ROS/MED HX  ENT/Pulmonary:  - neg pulmonary ROS     Neurologic: Comment: Migraine        Cardiovascular:     (+)  - - PIH  -  - -                                      METS/Exercise Tolerance:     Hematologic:  - neg hematologic  ROS     Musculoskeletal:       GI/Hepatic:     (+) GERD,          hepatitis         Renal/Genitourinary:       Endo:     (+)               Obesity,   gestational diabetes,    Psychiatric/Substance Use:       Infectious Disease:       Malignancy:       Other:            Physical Exam    Airway        Mallampati: II   TM distance: > 3 FB   Neck ROM: full   Mouth opening: > 3 cm    Respiratory Devices and Support         Dental  no notable dental history         Cardiovascular   cardiovascular exam normal          Pulmonary   pulmonary exam normal                OUTSIDE LABS:  CBC:   Lab Results   Component Value Date    WBC 6.0 2023    WBC 4.6 2023    HGB 13.2 2023    HGB 12.2 2023     HCT 36.9 08/23/2023    HCT 39.2 08/21/2023     (L) 08/24/2023     (L) 08/24/2023     BMP:   Lab Results   Component Value Date     08/23/2023     08/21/2023    POTASSIUM 3.9 08/23/2023    POTASSIUM 4.2 08/21/2023    CHLORIDE 109 (H) 08/23/2023    CHLORIDE 107 08/21/2023    CO2 17 (L) 08/23/2023    CO2 19 (L) 08/21/2023    BUN 17 08/23/2023    BUN 17.2 08/21/2023    CR 0.69 08/24/2023    CR 0.71 08/23/2023     (H) 08/24/2023     (H) 08/24/2023     COAGS:   Lab Results   Component Value Date    PTT 25 08/24/2023    INR 0.88 08/24/2023     POC:   Lab Results   Component Value Date    HCGS Negative 02/18/2018     HEPATIC:   Lab Results   Component Value Date    ALBUMIN 2.5 (L) 08/23/2023    PROTTOTAL 6.6 08/23/2023    ALT 21 08/24/2023    AST 25 08/24/2023    ALKPHOS 481 (H) 08/23/2023    BILITOTAL 0.6 08/23/2023     OTHER:   Lab Results   Component Value Date    LACT 0.9 08/08/2018    A1C 6.1 (H) 08/21/2023    DULCE 8.4 (L) 08/23/2023    LIPASE 29 08/08/2018    TSH 2.31 01/03/2023       Anesthesia Plan    ASA Status:  3       Anesthesia Type: Epidural.              Consents    Anesthesia Plan(s) and associated risks, benefits, and realistic alternatives discussed. Questions answered and patient/representative(s) expressed understanding.     - Discussed:     - Discussed with:  Patient            Postoperative Care            Comments:           neg OB ROS.       Sebastian Guevara MD   [Other: _____] : [unfilled] [FreeTextEntry1] : [facility]\par [address] [place] [address]\[place] [address]\par [phone]\par \par Endometrial adenocarcinoma

## 2023-08-24 NOTE — PHARMACY-ADMISSION MEDICATION HISTORY
Pharmacist Admission Medication History    Admission medication history is complete. The information provided in this note is only as accurate as the sources available at the time of the update.    Medication reconciliation/reorder completed by provider prior to medication history? No    Information Source(s): Patient and CareEverywhere/SureScripts via in-person    Pertinent Information:     Changes made to PTA medication list: No changes    Medication Affordability:       Allergies reviewed with patient and updates made in EHR: yes    Medications available for use during hospital stay: NONE.     Medication History Completed By: Alissa Craven RPH 8/24/2023 9:11 AM    PTA Med List   Medication Sig Last Dose    aspirin (ASA) 81 MG EC tablet Take 1 tablet (81 mg) by mouth daily 8/23/2023 at 0800    azaTHIOprine (IMURAN) 50 MG tablet Take 50 mg by mouth daily 8/23/2023 at 0800    hydroxychloroquine (PLAQUENIL) 200 MG tablet TAKE 1 & 1/2 TABLETS (300 MG) BY MOUTH ONCE DAILY. 8/23/2023 at 0800    Prenatal Vit-Fe Fumarate-FA (PRENATAL MULTIVITAMIN W/IRON) 27-0.8 MG tablet Take 1 tablet by mouth daily 8/23/2023 at 0800

## 2023-08-25 LAB
ALT SERPL W P-5'-P-CCNC: 16 U/L (ref 0–45)
ALT SERPL W P-5'-P-CCNC: 18 U/L (ref 0–45)
AST SERPL W P-5'-P-CCNC: 21 U/L (ref 0–40)
AST SERPL W P-5'-P-CCNC: 26 U/L (ref 0–40)
CREAT SERPL-MCNC: 0.68 MG/DL (ref 0.6–1.1)
CREAT SERPL-MCNC: 0.72 MG/DL (ref 0.6–1.1)
GFR SERPL CREATININE-BSD FRML MDRD: >90 ML/MIN/1.73M2
GFR SERPL CREATININE-BSD FRML MDRD: >90 ML/MIN/1.73M2
GLUCOSE BLDC GLUCOMTR-MCNC: 130 MG/DL (ref 70–99)
GLUCOSE BLDC GLUCOMTR-MCNC: 141 MG/DL (ref 70–99)
HGB BLD-MCNC: 10.9 G/DL (ref 11.7–15.7)
HGB BLD-MCNC: 9.7 G/DL (ref 11.7–15.7)
PLATELET # BLD AUTO: 104 10E3/UL (ref 150–450)
PLATELET # BLD AUTO: 108 10E3/UL (ref 150–450)
PLATELET # BLD AUTO: 112 10E3/UL (ref 150–450)

## 2023-08-25 PROCEDURE — 85018 HEMOGLOBIN: CPT | Performed by: FAMILY MEDICINE

## 2023-08-25 PROCEDURE — 85049 AUTOMATED PLATELET COUNT: CPT | Performed by: FAMILY MEDICINE

## 2023-08-25 PROCEDURE — 84460 ALANINE AMINO (ALT) (SGPT): CPT | Performed by: FAMILY MEDICINE

## 2023-08-25 PROCEDURE — 250N000011 HC RX IP 250 OP 636: Mod: JZ | Performed by: FAMILY MEDICINE

## 2023-08-25 PROCEDURE — 10907ZC DRAINAGE OF AMNIOTIC FLUID, THERAPEUTIC FROM PRODUCTS OF CONCEPTION, VIA NATURAL OR ARTIFICIAL OPENING: ICD-10-PCS | Performed by: FAMILY MEDICINE

## 2023-08-25 PROCEDURE — 250N000009 HC RX 250: Performed by: FAMILY MEDICINE

## 2023-08-25 PROCEDURE — 84450 TRANSFERASE (AST) (SGOT): CPT | Performed by: FAMILY MEDICINE

## 2023-08-25 PROCEDURE — 00HU33Z INSERTION OF INFUSION DEVICE INTO SPINAL CANAL, PERCUTANEOUS APPROACH: ICD-10-PCS | Performed by: FAMILY MEDICINE

## 2023-08-25 PROCEDURE — 250N000013 HC RX MED GY IP 250 OP 250 PS 637

## 2023-08-25 PROCEDURE — 59400 OBSTETRICAL CARE: CPT | Mod: GC | Performed by: FAMILY MEDICINE

## 2023-08-25 PROCEDURE — 36415 COLL VENOUS BLD VENIPUNCTURE: CPT | Performed by: FAMILY MEDICINE

## 2023-08-25 PROCEDURE — 120N000001 HC R&B MED SURG/OB

## 2023-08-25 PROCEDURE — 3E0R3BZ INTRODUCTION OF ANESTHETIC AGENT INTO SPINAL CANAL, PERCUTANEOUS APPROACH: ICD-10-PCS | Performed by: FAMILY MEDICINE

## 2023-08-25 PROCEDURE — 250N000012 HC RX MED GY IP 250 OP 636 PS 637

## 2023-08-25 PROCEDURE — 250N000011 HC RX IP 250 OP 636

## 2023-08-25 PROCEDURE — 722N000001 HC LABOR CARE VAGINAL DELIVERY SINGLE

## 2023-08-25 PROCEDURE — 82565 ASSAY OF CREATININE: CPT | Performed by: FAMILY MEDICINE

## 2023-08-25 RX ORDER — MISOPROSTOL 200 UG/1
400 TABLET ORAL
Status: DISCONTINUED | OUTPATIENT
Start: 2023-08-25 | End: 2023-08-27 | Stop reason: HOSPADM

## 2023-08-25 RX ORDER — METHYLERGONOVINE MALEATE 0.2 MG/ML
200 INJECTION INTRAVENOUS
Status: DISCONTINUED | OUTPATIENT
Start: 2023-08-25 | End: 2023-08-25

## 2023-08-25 RX ORDER — DEXTROSE MONOHYDRATE 25 G/50ML
25-50 INJECTION, SOLUTION INTRAVENOUS
Status: DISCONTINUED | OUTPATIENT
Start: 2023-08-25 | End: 2023-08-27 | Stop reason: HOSPADM

## 2023-08-25 RX ORDER — IBUPROFEN 800 MG/1
800 TABLET, FILM COATED ORAL EVERY 6 HOURS PRN
Status: DISCONTINUED | OUTPATIENT
Start: 2023-08-25 | End: 2023-08-27 | Stop reason: HOSPADM

## 2023-08-25 RX ORDER — OXYTOCIN 10 [USP'U]/ML
10 INJECTION, SOLUTION INTRAMUSCULAR; INTRAVENOUS
Status: DISCONTINUED | OUTPATIENT
Start: 2023-08-25 | End: 2023-08-27 | Stop reason: HOSPADM

## 2023-08-25 RX ORDER — MISOPROSTOL 200 UG/1
800 TABLET ORAL
Status: DISCONTINUED | OUTPATIENT
Start: 2023-08-25 | End: 2023-08-27 | Stop reason: HOSPADM

## 2023-08-25 RX ORDER — ACETAMINOPHEN 325 MG/1
650 TABLET ORAL EVERY 4 HOURS PRN
Status: DISCONTINUED | OUTPATIENT
Start: 2023-08-25 | End: 2023-08-27 | Stop reason: HOSPADM

## 2023-08-25 RX ORDER — NICOTINE POLACRILEX 4 MG
15-30 LOZENGE BUCCAL
Status: DISCONTINUED | OUTPATIENT
Start: 2023-08-25 | End: 2023-08-25

## 2023-08-25 RX ORDER — HYDROCORTISONE 25 MG/G
CREAM TOPICAL 3 TIMES DAILY PRN
Status: DISCONTINUED | OUTPATIENT
Start: 2023-08-25 | End: 2023-08-27 | Stop reason: HOSPADM

## 2023-08-25 RX ORDER — BISACODYL 10 MG
10 SUPPOSITORY, RECTAL RECTAL DAILY PRN
Status: DISCONTINUED | OUTPATIENT
Start: 2023-08-25 | End: 2023-08-27 | Stop reason: HOSPADM

## 2023-08-25 RX ORDER — DOCUSATE SODIUM 100 MG/1
100 CAPSULE, LIQUID FILLED ORAL DAILY
Status: DISCONTINUED | OUTPATIENT
Start: 2023-08-25 | End: 2023-08-27 | Stop reason: HOSPADM

## 2023-08-25 RX ORDER — NICOTINE POLACRILEX 4 MG
15-30 LOZENGE BUCCAL
Status: DISCONTINUED | OUTPATIENT
Start: 2023-08-25 | End: 2023-08-27 | Stop reason: HOSPADM

## 2023-08-25 RX ORDER — CARBOPROST TROMETHAMINE 250 UG/ML
250 INJECTION, SOLUTION INTRAMUSCULAR
Status: DISCONTINUED | OUTPATIENT
Start: 2023-08-25 | End: 2023-08-27 | Stop reason: HOSPADM

## 2023-08-25 RX ORDER — OXYTOCIN/0.9 % SODIUM CHLORIDE 30/500 ML
340 PLASTIC BAG, INJECTION (ML) INTRAVENOUS CONTINUOUS PRN
Status: DISCONTINUED | OUTPATIENT
Start: 2023-08-25 | End: 2023-08-27 | Stop reason: HOSPADM

## 2023-08-25 RX ORDER — MODIFIED LANOLIN
OINTMENT (GRAM) TOPICAL
Status: DISCONTINUED | OUTPATIENT
Start: 2023-08-25 | End: 2023-08-27 | Stop reason: HOSPADM

## 2023-08-25 RX ADMIN — AZATHIOPRINE 50 MG: 50 TABLET ORAL at 09:11

## 2023-08-25 RX ADMIN — IBUPROFEN 800 MG: 800 TABLET ORAL at 14:00

## 2023-08-25 RX ADMIN — ACETAMINOPHEN 650 MG: 325 TABLET ORAL at 07:56

## 2023-08-25 RX ADMIN — PENICILLIN G 3 MILLION UNITS: 3000000 INJECTION, SOLUTION INTRAVENOUS at 00:40

## 2023-08-25 RX ADMIN — HYDROXYCHLOROQUINE SULFATE 300 MG: 200 TABLET ORAL at 09:11

## 2023-08-25 RX ADMIN — IBUPROFEN 800 MG: 800 TABLET ORAL at 07:56

## 2023-08-25 RX ADMIN — IBUPROFEN 800 MG: 800 TABLET ORAL at 20:02

## 2023-08-25 RX ADMIN — ACETAMINOPHEN 650 MG: 325 TABLET ORAL at 20:02

## 2023-08-25 RX ADMIN — DOCUSATE SODIUM 100 MG: 100 CAPSULE, LIQUID FILLED ORAL at 07:56

## 2023-08-25 RX ADMIN — MAGNESIUM SULFATE IN WATER 2 G/HR: 40 INJECTION, SOLUTION INTRAVENOUS at 05:32

## 2023-08-25 RX ADMIN — LABETALOL HYDROCHLORIDE 20 MG: 5 INJECTION, SOLUTION INTRAVENOUS at 01:29

## 2023-08-25 RX ADMIN — Medication 2 MILLI-UNITS/MIN: at 00:48

## 2023-08-25 ASSESSMENT — ACTIVITIES OF DAILY LIVING (ADL)
ADLS_ACUITY_SCORE: 20
ADLS_ACUITY_SCORE: 20
ADLS_ACUITY_SCORE: 21
ADLS_ACUITY_SCORE: 20
ADLS_ACUITY_SCORE: 20
ADLS_ACUITY_SCORE: 21
ADLS_ACUITY_SCORE: 20
ADLS_ACUITY_SCORE: 21
ADLS_ACUITY_SCORE: 20

## 2023-08-25 NOTE — PROGRESS NOTES
Progress note    Earnestine Guillen is a 37w6d pregnant female admitted for pre-eclampsia with severe features .  She's not really feeling her ctx after placement of epidural at 1611. AROM with clear fluid at 1950 by Dr. Mckenna. Akbar placed 1811. IUPC placed 2000 due to mild ctx pattern q5-10 mins seen on external, at times irregular. Fluid return seen on insertion.  Continues on magnesium and pit (24milli units/min)- Denies pain, headache, emesis, muscle weakness, SOB  Penicillin running  No prn labetalol needed since 2247 on 8/23    Vitals: Temp: 97.7  F (36.5  C) Temp src: Oral BP: 129/88 Pulse: 73   Resp: 16 SpO2: 97 % O2 Device: None (Room air)      Gen: NAD, using peanut ball  SVE: 4.5/80/-1/soft  FHT: cat 1  [place]: q5-10 min    A/P  IUP at 37w6d in latent labor with irregular ctx pattern now with IUPC. 6 hours without significant cervical change in a multip, however latent phase duration is highly variable. Continue current plan and will re-check in 1-2 hours.  -GBS pos continue PCN  -preeclampsia with severe features on HTN protocol. Continue mag and monitor for signs of toxicity --> patellar reflexes normal  -induction ongoing with pit at 24  -epidural placed  -patient agreeable to plan    Patient seen and discussed with attending physician Dr. Rebecca Oates MD  PGY-1

## 2023-08-25 NOTE — L&D DELIVERY NOTE
[facility] Delivery Summary  [facility]  Date of Service: 2023    Name      Earnestine Guillen         1980  MRN       [id number]  PCP        Rebecca Mckenna     DELIVERY NARRATIVE    On 2023 Earnestine Guillen delivered a viable male infant at 38w0d with apgars of 8 and 9 via Vaginal, Spontaneous Delivery.  Prenatal course was notable for preeclampsia with severe features on magnesium, AMA, pregestational diabetes, GBS positive s/p 3 doses PCN.    Stage 1: Earnestine presented to Maternity Care on 2023 with regular ctx 10 min apart. Her group B Strep (GBS) carrier status was positive. She received 3 intrapartum doses of IV penicillin.. She received pitocin and AROM for induction/augmentation.  Labor course was notable for preeclampsia with severe features.    Stage 2: Duration:  39 minutes.  Delivery was via vaginal, spontaneous  to a sterile field under epidural  anesthesia. Patient had edematous anterior cervical lip that was reduced to allow infant head to deliver through. This was performed without difficulty. Akbar removed. Infant delivered in vertex  restituted right  occiput  anterior  position. Shoulders delivered without difficulty. The baby was placed on the patient's abdomen and demonstrated a spontaneous cry and vigorous tone.  No resuscitation was needed.  Cord complications: none . Delayed cord clamping was performed.  The cord was doubly clamped and cut 3 vessels  were noted. During pushing, patient had 2 severe range Bps that were treated with IV labetalol 20 mg x1.     Stage 3: Duration: 7 minutes.  Placenta delivered intact at 2023  1:44 AM . Placental disposition was Hospital disposal . Fundal massage performed and fundus found to be firm. The following uterotonics were given: None. Perineum, vagina, cervix were inspected, and the following lacerations were noted: None. QBL: 244 mL.    Excellent hemostasis was noted.  Needle and sponge count correct. Infant and patient in delivery room in good and stable condition.     _________________    GA: 38w0d  GP:   Labor Complications: None   Additional Complications:    QBL: 244 mL  Delivery Type: Vaginal, Spontaneous   Duration of Ruptured Membranes: 9h 06m  GBS Status: positive  Group B Strep PCR   Date Value Ref Range Status   2023 Positive (A) Negative Final     Comment:     ALERT: Streptococcus agalactiae (Group B Streptococcus) has a high rate of resistance to clindamycin. Therefore, clindamycin is not recommended for treatment unless susceptibility testing has been performed.       Weight:    Apgar scores: 8 , 9         Wilmer Francisco JEarnestine [[id number]]      Labor Event Times      Latent labor onset date/time: 2023 1610    Dilation complete date: 23 Complete time: 12:58 AM   Start pushing date/time: 2023 0100          Labor Events     labor?: No   steroids: None  Labor Type: Induction/Cervical ripening  Predominate monitoring during 1st stage: continuous electronic fetal monitoring     Antibiotics received during labor?: Yes  Reason for Antibiotics: GBS  Antibiotics received for GBS: Penicillin  Antibiotics Given (GBS): Greater than 4 hours prior to delivery       Rupture date/time: 23 1631   Rupture type: Spontaneous Rupture of Membranes  Fluid color: Clear  Fluid odor: Normal     Induction: Oxytocin  Induction date/time: 23 0900    Cervical ripening date/time:      Indications for induction: Severe Preeclampsia, Advanced Maternal Age, Diabetes (Comment to specify)     Augmentation: AROM  Indications for augmentation: Preeclampsia, Ineffective Contraction Pattern       Delivery/Placenta Date and Time      Delivery Date: 23 Delivery Time:  1:37 AM   Placenta Date/Time: 2023  1:44 AM  Delivering clinician: Rebecca Mckenna MD   Other personnel present at delivery:  Provider Role   Leslie Davila RN     Krista Raymundo, RN              Vaginal Counts       Initial count performed by 2 team members:  Two Team Members   debbi gray         Needles Suture Needles Sponges (RETIRED) Instruments   Initial counts       Added to count       Relief counts       Final counts               Placed during labor Accounted for at the end of labor   FSE No NA   IUPC Yes Yes   Cervidil No NA                  Final count performed by 2 team members:  Two Team Members   debbi gray      Final count correct?: Yes  Pre-Birth Team Brief: Complete  Post-Birth Team Debrief: Complete       Apgars    Living status: Living   1 Minute 5 Minute 10 Minute 15 Minute 20 Minute   Skin color: 0  1       Heart rate: 2  2       Reflex irritability: 2  2       Muscle tone: 2  2       Respiratory effort: 2  2       Total: 8  9       Apgars assigned by: DEBBI       Cord      Vessels: 3 Vessels    Cord Complications: None               Cord Blood Disposition: Lab    Gases Sent?: No    Delayed cord clamping?: Yes    Cord Clamping Delay (seconds): >120 seconds    Stem cell collection?: No            Resuscitation    Methods: None       Skin to Skin and Feeding Plan      Skin to skin initiation date/time: 1841    Skin to skin with: Mother  Skin to skin end date/time:            Labor Events and Shoulder Dystocia    Fetal Tracing Prior to Delivery: Category 1  Shoulder dystocia present?: Neg       Delivery (Maternal) (Provider to Complete) ([id number])    Episiotomy: None  Perineal lacerations: None    Est. blood loss (mL): 200  Repair suture: None  Number of repair packets: 0  Genital tract inspection done: Pos       Blood Loss  Mother: Earnestine Guillen #[id number]     Start of Mother's Information      Delivery Blood Loss  23 1337 - 23 0233      EBL (mL) Hospital Encounter 200 mL    Delivery QBL (mL) Hospital Encounter 244 mL    Total  444 mL               End of Mother's Information  Mother: Earnestine Guillen  #[id number]                Delivery - Provider to Complete (990662)    Delivering clinician: Rebecca Mckenna MD  Delivery Type (Choose the 1 that will go to the Birth History): Vaginal, Spontaneous                         Other personnel:  Provider Role   Leslie Davila RN    ZackaryKrista sigala RN                     Placenta    Date/Time: 8/25/2023  1:44 AM  Removal: Spontaneous  Comments: 3 vessel cord  Disposition: Hospital disposal             Anesthesia    Method: Epidural  Cervical dilation at placement: 4-7                    Presentation and Position    Presentation: Vertex    Position: Right Occiput Anterior                     Delivery was supervised by attending physician Dr. Clarisa Oates MD PGY1 8/25/2023  [facility] Residency Program

## 2023-08-25 NOTE — PROGRESS NOTES
Latest Reference Range & Units 08/25/23 06:09   GLUCOSE BY METER POCT 70 - 99 mg/dL 141 (H)     Result reported to Ob resident Dr Oates.

## 2023-08-25 NOTE — PROGRESS NOTES
POST-PARTUM PROGRESS NOTE    Assessment and Plan: Patient is a 42 year old year old  admitted on 2023. She delivered a healthy infant via Vaginal, Spontaneous  on 2023  at 1:37 AM . Patient is doing well overall and has no concerns.   Postpartum day 1  Normal postpartum course.  Continue to encourage and assist her with breastfeeding and routine  cares  Continue ibuprofen for pain control  Encourage ambulation  Anticipate discharge -      Patient discussed with attending physician, Dr. Mckenna , who agrees with the plan.    Brandi Figueroa MD PGY-1  2023  [facility] Residency Program    Subjective:  The patient feels well. She is bonding with the infant. Voiding without difficulty, lochia normal, tolerating normal diet, and passing flatus.  Pain is well controlled with current medications.  The patient has no emotional concerns to report at this time. The baby is well and being fed by breast.    Objective:  /67 (BP Location: Left arm, Patient Position: Semi-Marie's, Cuff Size: Adult Regular)   Pulse 68   Temp 98.4  F (36.9  C) (Oral)   Resp 16   Ht 1.524 m (5')   Wt 69.9 kg (154 lb)   LMP 2022 (Exact Date)   SpO2 98%   BMI 30.08 kg/m    GEN: NAD  CV: RRR, no murmurs  PULM: CTAB  ABD: Fundus firm, slightly tender to palpation  EXT: Warm, dry and well perfused. No edema. Calves NTTP and equal.     Prenatal Labs:   Lab Results   Component Value Date    AS Negative 2023    HEPBANG Nonreactive 2023    GCPCRT Negative 2018    HGB 10.9 (L) 2023     Hemoglobin   Date Value Ref Range Status   2023 10.9 (L) 11.7 - 15.7 g/dL Final   2023 13.2 11.7 - 15.7 g/dL Final        Immunization History   Administered Date(s) Administered    COVID-19 MONOVALENT 12+ (Pfizer) 2021, 04/15/2021, 2021    Hepatitis B, Adult 2018    Influenza (IIV3) PF 09/10/2018    Influenza Vaccine >6  months (Alfuria,Fluzone) 09/10/2018, 11/06/2019, 11/22/2021    TDAP (Adacel,Boostrix) 07/17/2017, 12/06/2019    Td (Adult), Adsorbed 05/23/2005, 03/01/2006    Td,adult,historic,unspecified 03/01/2006

## 2023-08-26 PROBLEM — O14.10 PREECLAMPSIA, SEVERE: Status: ACTIVE | Noted: 2023-08-26

## 2023-08-26 LAB
AST SERPL W P-5'-P-CCNC: 19 U/L (ref 0–40)
AST SERPL W P-5'-P-CCNC: 28 U/L (ref 0–40)
CREAT SERPL-MCNC: 0.65 MG/DL (ref 0.6–1.1)
CREAT SERPL-MCNC: 0.71 MG/DL (ref 0.6–1.1)
GFR SERPL CREATININE-BSD FRML MDRD: >90 ML/MIN/1.73M2
GFR SERPL CREATININE-BSD FRML MDRD: >90 ML/MIN/1.73M2
HGB BLD-MCNC: 10.8 G/DL (ref 11.7–15.7)
HGB BLD-MCNC: 9.3 G/DL (ref 11.7–15.7)
PLATELET # BLD AUTO: 133 10E3/UL (ref 150–450)
PLATELET # BLD AUTO: 97 10E3/UL (ref 150–450)

## 2023-08-26 PROCEDURE — 84450 TRANSFERASE (AST) (SGOT): CPT | Performed by: FAMILY MEDICINE

## 2023-08-26 PROCEDURE — 85049 AUTOMATED PLATELET COUNT: CPT | Performed by: FAMILY MEDICINE

## 2023-08-26 PROCEDURE — 120N000001 HC R&B MED SURG/OB

## 2023-08-26 PROCEDURE — 99207 PR SUBSEQUENT HOSPITAL CARE FOR MOTHER, 15 MINUTES: CPT | Performed by: FAMILY MEDICINE

## 2023-08-26 PROCEDURE — 250N000013 HC RX MED GY IP 250 OP 250 PS 637

## 2023-08-26 PROCEDURE — 36415 COLL VENOUS BLD VENIPUNCTURE: CPT | Performed by: FAMILY MEDICINE

## 2023-08-26 PROCEDURE — 85018 HEMOGLOBIN: CPT | Performed by: FAMILY MEDICINE

## 2023-08-26 PROCEDURE — 250N000012 HC RX MED GY IP 250 OP 636 PS 637

## 2023-08-26 PROCEDURE — 250N000013 HC RX MED GY IP 250 OP 250 PS 637: Performed by: FAMILY MEDICINE

## 2023-08-26 PROCEDURE — 82565 ASSAY OF CREATININE: CPT | Performed by: FAMILY MEDICINE

## 2023-08-26 RX ORDER — NIFEDIPINE 30 MG/1
30 TABLET, EXTENDED RELEASE ORAL ONCE
Status: COMPLETED | OUTPATIENT
Start: 2023-08-26 | End: 2023-08-26

## 2023-08-26 RX ORDER — NIFEDIPINE 10 MG/1
10-20 CAPSULE ORAL
Status: DISCONTINUED | OUTPATIENT
Start: 2023-08-26 | End: 2023-08-27 | Stop reason: HOSPADM

## 2023-08-26 RX ORDER — LABETALOL HYDROCHLORIDE 5 MG/ML
20 INJECTION, SOLUTION INTRAVENOUS
Status: DISCONTINUED | OUTPATIENT
Start: 2023-08-26 | End: 2023-08-27 | Stop reason: HOSPADM

## 2023-08-26 RX ORDER — NIFEDIPINE 30 MG
30 TABLET, EXTENDED RELEASE ORAL DAILY
Qty: 30 TABLET | Refills: 0 | Status: SHIPPED | OUTPATIENT
Start: 2023-08-26 | End: 2023-08-26

## 2023-08-26 RX ORDER — IBUPROFEN 800 MG/1
800 TABLET, FILM COATED ORAL
Qty: 30 TABLET | Refills: 0 | Status: SHIPPED | OUTPATIENT
Start: 2023-08-26 | End: 2023-09-25

## 2023-08-26 RX ORDER — NIFEDIPINE 30 MG/1
30 TABLET, EXTENDED RELEASE ORAL DAILY
Status: DISCONTINUED | OUTPATIENT
Start: 2023-08-26 | End: 2023-08-27 | Stop reason: HOSPADM

## 2023-08-26 RX ORDER — DOCUSATE SODIUM 100 MG/1
100 CAPSULE, LIQUID FILLED ORAL DAILY
Qty: 30 CAPSULE | Refills: 0 | Status: SHIPPED | OUTPATIENT
Start: 2023-08-27 | End: 2024-01-03

## 2023-08-26 RX ADMIN — NIFEDIPINE 30 MG: 30 TABLET, FILM COATED, EXTENDED RELEASE ORAL at 11:30

## 2023-08-26 RX ADMIN — HYDROXYCHLOROQUINE SULFATE 300 MG: 200 TABLET ORAL at 08:50

## 2023-08-26 RX ADMIN — NIFEDIPINE 10 MG: 10 CAPSULE ORAL at 14:04

## 2023-08-26 RX ADMIN — AZATHIOPRINE 50 MG: 50 TABLET ORAL at 08:50

## 2023-08-26 RX ADMIN — IBUPROFEN 800 MG: 800 TABLET ORAL at 18:47

## 2023-08-26 RX ADMIN — ACETAMINOPHEN 650 MG: 325 TABLET ORAL at 18:47

## 2023-08-26 RX ADMIN — DOCUSATE SODIUM 100 MG: 100 CAPSULE, LIQUID FILLED ORAL at 08:50

## 2023-08-26 ASSESSMENT — ACTIVITIES OF DAILY LIVING (ADL)
ADLS_ACUITY_SCORE: 20

## 2023-08-26 NOTE — PLAN OF CARE
Goal Outcome Evaluation:    VSS. Stable gait. Independent in room. Pain well controlled with PRN tylenol and ibuprofen. Pt c/o headache. BP stable. Off Mg since 0145. Reflex WDL. Fundus firm. Minimal bleeding. No clots noted. Voiding. Pt bonding well with infant. Independent with cares.

## 2023-08-26 NOTE — PROVIDER NOTIFICATION
08/26/23 1130 08/26/23 1230 08/26/23 1330   Vital Signs   BP (!) 180/90  (md notified.) (!) 174/85  (MD notified) (!) 170/98  (MD notfied)   BP Location Left arm Left arm Left arm   Patient Position Semi-Marie's Semi-Marie's Semi-Marie's           MD notifed of severe range BP's.  See prior note for plan of care and order's from Dr. Boston after the 1230 BP.     Writer page Dr. Boston after 1330 BP was taken. Per Dr. Boston, give another does of 30 mg of Nifedipine.  Writer call back to clarify if she wanted 30 mg Nifedipine ER.  Per Dr. Boston, give 30 mg of Nifedipine ER.     Writer called back to express concern that patient continues to be in severe range. Per Preeclampsia/Hypertension OB order set states that 1st line of treatment is currently ordered as IV labetalol for acute onset of severe range BP's.  Per Dr. Boston do not give IV labetalol due to patient allergy.      New Order per Dr. Boston is to give 10 mg of nifedipine.     Mally Zuluaga RN

## 2023-08-26 NOTE — PROGRESS NOTES
MD resident updated on elevated morning BP readings. Resident to notify Dr Mckenna or MD on call to make a plan of care.     Alissa Sapp RN

## 2023-08-26 NOTE — PROVIDER NOTIFICATION
Patients recent /85.  Writer updated Alissa Sapp RN.  Per her request to call Dr. Boston for plan of care.  Writer updated Dr. Boston of severe range pressure that continue. Dr. Boston stated to not treat severe range pressure now, and to recheck patients BP at 1330.     Mally Zuluaga. MONE   English

## 2023-08-26 NOTE — PROGRESS NOTES
[facility] Post-Partum Progress Note          Assessment and Plan:    Assessment:   Post-partum day #1  Normal spontaneous vaginal delivery  L&D complications: Pregnancy induced hypertension  Preeclampsia with sever features, with no symptoms of headache , shortness of breath , abdominal or chest pain. Pre eclampsia labs which were done this morning were in normal range   Completed 24 hr of mag sulphate during antepartum postpartum period   BP reading in sever range for since 9 am at that time nifedipine Xr 30 mg started as were planning to discharge the patient but as BP stayed in sever range pre eclampsia order set initiated again starting with nifedipine rapid release 10 mg dose and repeat the labs       No excessive bleeding  Pain well-controlled.      Plan:   Closely monitor sign and symptoms of preeclampsia , hypertension management per orders set   Pain control measures as needed  Anticipate discharge tomorrow           Interval History:   Doing well.  Pain is adequately controlled.  No fevers.  No history of foul-smelling vaginal discharge.  Good appetite.  Denies chest pain, shortness of breath, nausea or vomiting.  Vaginal bleeding is similar to a heavy menstrual flow.  Breastfeeding well.          Significant Problems:     Patient Active Problem List   Diagnosis    Abnormal LFTs s/t autoimmune hepatitis    ASCUS with positive high risk HPV cervical    Hepatitis, autoimmune (H)    Migraine with aura and without status migrainosus, not intractable    Need for hepatitis B vaccination    Positive EMELIA (antinuclear antibody)    SS-A and SS-B antibody positive    Sjogren's syndrome (H)iwth ++ SSA/SSB    Diet controlled gestational diabetes mellitus (GDM) in third trimester     premature rupture of membranes (PPROM) at 34w2d     (normal spontaneous vaginal delivery)    Cervical cancer screening    Encounter for triage in pregnant patient    Hypertension complicating pregnancy     Preeclampsia, severe              Review of Systems:    The Review of Systems is negative other than noted in the HPI          Medications:   All medications related to the patient's surgery have been reviewed          Physical Exam:   Vitals were reviewed  All vitals stable  Patient Vitals for the past 72 hrs:   BP Temp Temp src Pulse Resp SpO2 Height Weight   08/26/23 1330 (!) (P) 170/98 -- -- -- -- -- -- --   08/26/23 1230 (!) (P) 174/85 -- -- -- -- -- -- --   08/26/23 1130 (!) 180/90 -- -- -- -- -- -- --   08/26/23 1000 (!) 142/92 -- -- -- -- -- -- --   08/26/23 0936 (!) 159/82 97.9  F (36.6  C) Oral 73 16 97 % -- --   08/26/23 0900 (!) 161/82 98  F (36.7  C) Oral 74 16 99 % -- --   08/26/23 0430 119/68 98.5  F (36.9  C) Oral 81 16 98 % -- --   08/26/23 0000 122/70 98  F (36.7  C) Oral 92 16 98 % -- --   08/25/23 2000 125/73 98  F (36.7  C) Oral 77 16 97 % -- --   08/25/23 1600 138/77 -- -- 70 16 -- -- --   08/25/23 1157 113/64 -- -- 74 16 -- -- --   08/25/23 1100 -- -- -- 63 -- -- -- --   08/25/23 0900 -- -- -- 62 -- -- -- --   08/25/23 0756 123/67 98.4  F (36.9  C) Oral 68 16 98 % -- --   08/25/23 0700 112/68 -- -- -- 16 -- -- --   08/25/23 0600 110/69 98.2  F (36.8  C) -- -- 16 -- -- --   08/25/23 0500 122/71 -- -- -- 16 -- -- --   08/25/23 0430 125/76 -- -- -- 16 -- -- --   08/25/23 0405 -- -- -- -- -- 97 % -- --   08/25/23 0403 120/79 98.7  F (37.1  C) Oral -- 16 -- -- --   08/25/23 0400 -- -- -- -- -- 98 % -- --   08/25/23 0355 -- -- -- -- -- 98 % -- --   08/25/23 0354 133/81 -- -- -- -- -- -- --   08/25/23 0350 -- -- -- -- -- 98 % -- --   08/25/23 0345 -- -- -- -- -- 97 % -- --   08/25/23 0344 116/73 -- -- -- -- -- -- --   08/25/23 0340 -- -- -- -- -- 98 % -- --   08/25/23 0335 -- -- -- -- -- 97 % -- --   08/25/23 0334 123/74 -- -- -- -- -- -- --   08/25/23 0330 -- -- -- -- -- 98 % -- --   08/25/23 0325 -- -- -- -- -- 98 % -- --   08/25/23 0323 135/74 -- -- -- -- -- -- --   08/25/23 0320 -- -- -- -- --  98 % -- --   08/25/23 0315 -- -- -- -- -- 98 % -- --   08/25/23 0314 137/76 -- -- -- -- -- -- --   08/25/23 0305 -- -- -- -- -- 98 % -- --   08/25/23 0303 (!) 143/83 -- -- -- -- -- -- --   08/25/23 0300 -- -- -- -- -- 98 % -- --   08/25/23 0255 -- -- -- -- -- 97 % -- --   08/25/23 0254 132/74 -- -- -- -- -- -- --   08/25/23 0244 (!) 144/79 -- -- -- -- -- -- --   08/25/23 0233 (!) 152/90 -- -- -- -- -- -- --   08/25/23 0223 (!) 152/88 -- -- -- -- -- -- --   08/25/23 0220 -- -- -- -- -- 98 % -- --   08/25/23 0215 -- -- -- -- -- 98 % -- --   08/25/23 0213 (!) 155/79 -- -- -- -- -- -- --   08/25/23 0210 -- -- -- -- -- 98 % -- --   08/25/23 0205 -- -- -- -- -- 98 % -- --   08/25/23 0204 137/76 -- -- -- -- -- -- --   08/25/23 0200 -- -- -- -- -- 99 % -- --   08/25/23 0155 -- -- -- -- -- 97 % -- --   08/25/23 0154 (!) 150/82 -- -- -- -- -- -- --   08/25/23 0150 -- -- -- -- -- 96 % -- --   08/25/23 0145 -- -- -- -- -- 98 % -- --   08/25/23 0144 (!) 156/85 -- -- -- -- -- -- --   08/25/23 0140 -- -- -- -- -- 97 % -- --   08/25/23 0135 -- -- -- -- -- 98 % -- --   08/25/23 0130 -- -- -- -- -- 99 % -- --   08/25/23 0127 (!) 176/99 -- -- -- -- -- -- --   08/25/23 0125 -- -- -- -- -- 100 % -- --   08/25/23 0120 -- -- -- -- -- 99 % -- --   08/25/23 0115 -- -- -- -- -- 99 % -- --   08/25/23 0110 -- -- -- -- -- 99 % -- --   08/25/23 0109 (!) 162/100 -- -- -- -- -- -- --   08/25/23 0105 -- -- -- -- -- 99 % -- --   08/25/23 0100 -- -- -- -- -- 99 % -- --   08/25/23 0055 -- -- -- -- -- 98 % -- --   08/25/23 0050 -- -- -- -- -- 97 % -- --   08/25/23 0045 -- -- -- -- -- 98 % -- --   08/25/23 0042 -- 99.2  F (37.3  C) Oral -- -- -- -- --   08/25/23 0040 -- -- -- -- -- 97 % -- --   08/25/23 0035 -- -- -- -- -- 98 % -- --   08/25/23 0030 -- -- -- -- -- 98 % -- --   08/25/23 0025 -- -- -- -- -- 99 % -- --   08/25/23 0020 -- -- -- -- -- 98 % -- --   08/25/23 0015 -- -- -- -- -- 97 % -- --   08/25/23 0010 -- -- -- -- -- 97 % -- --   08/25/23  0009 (!) 152/81 -- -- -- -- -- -- --   08/25/23 0005 -- -- -- -- -- 97 % -- --   08/25/23 0000 -- -- -- -- -- 97 % -- --   08/24/23 2350 -- -- -- -- -- 97 % -- --   08/24/23 2345 -- -- -- -- -- 97 % -- --   08/24/23 2340 -- -- -- -- -- 98 % -- --   08/24/23 2339 123/74 -- -- -- -- -- -- --   08/24/23 2335 -- -- -- -- -- 98 % -- --   08/24/23 2325 -- -- -- -- -- 98 % -- --   08/24/23 2320 -- -- -- -- -- 98 % -- --   08/24/23 2315 -- -- -- -- -- 98 % -- --   08/24/23 2310 -- -- -- -- -- 99 % -- --   08/24/23 2309 131/79 -- -- -- -- -- -- --   08/24/23 2300 -- -- -- -- -- 99 % -- --   08/24/23 2255 -- -- -- -- -- 99 % -- --   08/24/23 2250 -- -- -- -- -- 99 % -- --   08/24/23 2245 -- -- -- -- -- 99 % -- --   08/24/23 2240 -- -- -- -- -- 99 % -- --   08/24/23 2239 (!) 150/83 -- -- -- -- -- -- --   08/24/23 2235 -- -- -- -- -- 99 % -- --   08/24/23 2230 -- -- -- -- -- 99 % -- --   08/24/23 2225 -- -- -- -- -- 99 % -- --   08/24/23 2220 -- -- -- -- -- 99 % -- --   08/24/23 2215 -- -- -- -- -- 99 % -- --   08/24/23 2210 -- -- -- -- -- 99 % -- --   08/24/23 2209 132/71 -- -- -- -- -- -- --   08/24/23 2205 -- -- -- -- -- 98 % -- --   08/24/23 2200 -- -- -- -- -- 98 % -- --   08/24/23 2155 -- -- -- -- -- 99 % -- --   08/24/23 2150 -- -- -- -- -- 98 % -- --   08/24/23 2145 -- -- -- -- -- 99 % -- --   08/24/23 2140 -- -- -- -- -- 99 % -- --   08/24/23 2139 133/80 -- -- -- -- -- -- --   08/24/23 2135 -- -- -- -- -- 99 % -- --   08/24/23 2130 -- -- -- -- -- 98 % -- --   08/24/23 2125 -- -- -- -- -- 99 % -- --   08/24/23 2120 -- -- -- -- -- 99 % -- --   08/24/23 2115 -- -- -- -- -- 98 % -- --   08/24/23 2110 -- -- -- -- -- 98 % -- --   08/24/23 2108 138/78 -- -- -- -- -- -- --   08/24/23 2105 -- -- -- -- -- 99 % -- --   08/24/23 2100 -- -- -- -- -- 98 % -- --   08/24/23 2055 -- -- -- -- -- 99 % -- --   08/24/23 2053 132/68 -- -- -- -- -- -- --   08/24/23 2050 -- -- -- -- -- 99 % -- --   08/24/23 2045 -- -- -- -- -- 98 % -- --    08/24/23 2040 -- -- -- -- -- 98 % -- --   08/24/23 2039 136/78 97.7  F (36.5  C) Oral -- -- -- -- --   08/24/23 2035 -- -- -- -- -- 98 % -- --   08/24/23 2030 -- -- -- -- -- 99 % -- --   08/24/23 2025 -- -- -- -- -- 98 % -- --   08/24/23 2023 135/79 -- -- -- -- -- -- --   08/24/23 2020 -- -- -- -- -- 98 % -- --   08/24/23 2015 -- -- -- -- -- 99 % -- --   08/24/23 2010 -- -- -- -- -- 98 % -- --   08/24/23 2005 -- -- -- -- -- 98 % -- --   08/24/23 2000 -- -- -- -- -- 97 % -- --   08/24/23 1955 -- -- -- -- -- 97 % -- --   08/24/23 1953 129/75 -- -- -- -- -- -- --   08/24/23 1950 -- -- -- -- -- 98 % -- --   08/24/23 1945 -- -- -- -- -- 97 % -- --   08/24/23 1940 -- -- -- -- -- 98 % -- --   08/24/23 1938 110/67 -- -- -- -- -- -- --   08/24/23 1935 -- -- -- -- -- 98 % -- --   08/24/23 1930 -- -- -- -- -- 97 % -- --   08/24/23 1917 107/67 -- -- -- -- -- -- --   08/24/23 1900 -- -- -- -- -- 97 % -- --   08/24/23 1855 -- -- -- -- -- 98 % -- --   08/24/23 1853 129/88 -- -- -- -- -- -- --   08/24/23 1850 -- -- -- -- -- 98 % -- --   08/24/23 1845 -- -- -- -- -- 98 % -- --   08/24/23 1840 -- -- -- -- -- 98 % -- --   08/24/23 1838 134/85 -- -- -- -- -- -- --   08/24/23 1835 -- -- -- -- -- 98 % -- --   08/24/23 1830 -- -- -- -- -- 98 % -- --   08/24/23 1825 -- -- -- -- -- 98 % -- --   08/24/23 1823 127/81 -- -- -- -- -- -- --   08/24/23 1820 -- -- -- -- -- 97 % -- --   08/24/23 1815 -- -- -- -- -- 97 % -- --   08/24/23 1810 -- -- -- -- -- 98 % -- --   08/24/23 1808 119/76 -- -- -- -- -- -- --   08/24/23 1806 -- 97.7  F (36.5  C) Oral -- 16 -- -- --   08/24/23 1805 -- -- -- -- -- 97 % -- --   08/24/23 1800 -- -- -- -- -- 98 % -- --   08/24/23 1755 -- -- -- -- -- 98 % -- --   08/24/23 1751 112/59 -- -- -- -- -- -- --   08/24/23 1750 -- -- -- -- -- 98 % -- --   08/24/23 1748 93/55 -- -- -- -- -- -- --   08/24/23 1745 -- -- -- -- -- 97 % -- --   08/24/23 1743 119/67 -- -- -- -- -- -- --   08/24/23 1740 -- -- -- -- -- 97 % -- --    08/24/23 1738 107/63 -- -- -- -- -- -- --   08/24/23 1735 -- -- -- -- -- 97 % -- --   08/24/23 1733 111/66 -- -- -- -- -- -- --   08/24/23 1730 -- -- -- -- -- 97 % -- --   08/24/23 1728 103/61 -- -- -- -- -- -- --   08/24/23 1725 -- -- -- -- -- 97 % -- --   08/24/23 1723 111/64 -- -- -- -- -- -- --   08/24/23 1720 -- -- -- -- -- 99 % -- --   08/24/23 1718 112/65 -- -- -- -- -- -- --   08/24/23 1715 -- -- -- -- -- 98 % -- --   08/24/23 1713 108/61 -- -- -- -- -- -- --   08/24/23 1710 -- -- -- -- -- 99 % -- --   08/24/23 1708 98/59 -- -- -- -- -- -- --   08/24/23 1705 -- -- -- -- -- 98 % -- --   08/24/23 1703 100/60 -- -- -- -- -- -- --   08/24/23 1700 102/61 -- -- -- 16 99 % -- --   08/24/23 1655 -- -- -- -- -- 99 % -- --   08/24/23 1653 101/60 -- -- -- -- -- -- --   08/24/23 1650 -- -- -- -- -- 100 % -- --   08/24/23 1648 105/60 -- -- -- -- -- -- --   08/24/23 1645 -- -- -- -- -- 99 % -- --   08/24/23 1643 121/74 -- -- -- -- -- -- --   08/24/23 1640 123/70 -- -- -- -- 100 % -- --   08/24/23 1635 -- -- -- -- -- 100 % -- --   08/24/23 1632 122/68 -- -- -- -- -- -- --   08/24/23 1630 116/68 -- -- -- -- 100 % -- --   08/24/23 1628 125/73 -- -- -- -- -- -- --   08/24/23 1626 132/73 -- -- -- -- -- -- --   08/24/23 1625 -- -- -- -- -- 100 % -- --   08/24/23 1624 (!) 155/88 -- -- -- -- -- -- --   08/24/23 1622 (!) 167/93 -- -- -- -- -- -- --   08/24/23 1620 -- -- -- -- -- 99 % -- --   08/24/23 1615 -- -- -- -- -- 99 % -- --   08/24/23 1600 135/85 98  F (36.7  C) Oral -- -- -- -- --   08/24/23 1500 137/81 -- -- -- -- 97 % -- --   08/24/23 1400 139/82 98.6  F (37  C) Oral -- -- 97 % -- --   08/24/23 1329 -- -- -- -- -- 96 % -- --   08/24/23 1324 -- -- -- -- -- 98 % -- --   08/24/23 1319 -- -- -- -- -- 96 % -- --   08/24/23 1314 -- -- -- -- -- 97 % -- --   08/24/23 1309 -- -- -- -- -- 97 % -- --   08/24/23 1304 -- -- -- -- -- 96 % -- --   08/24/23 1300 125/76 -- -- -- -- 97 % -- --   08/24/23 1254 -- -- -- -- -- 97 % -- --    08/24/23 1249 -- -- -- -- -- 97 % -- --   08/24/23 1244 -- -- -- -- -- 97 % -- --   08/24/23 1237 -- -- -- -- -- 97 % -- --   08/24/23 1232 -- -- -- -- -- 96 % -- --   08/24/23 1227 -- -- -- -- -- 97 % -- --   08/24/23 1222 -- -- -- -- -- 96 % -- --   08/24/23 1217 -- -- -- -- -- 97 % -- --   08/24/23 1212 -- -- -- -- -- 97 % -- --   08/24/23 1207 -- -- -- -- -- 97 % -- --   08/24/23 1200 130/73 98.7  F (37.1  C) Oral -- 16 96 % -- --   08/24/23 1157 -- -- -- -- -- 96 % -- --   08/24/23 1152 -- -- -- -- -- 96 % -- --   08/24/23 1147 -- -- -- -- -- 97 % -- --   08/24/23 1142 -- -- -- -- -- 96 % -- --   08/24/23 1137 -- -- -- -- -- 97 % -- --   08/24/23 1132 -- -- -- -- -- 96 % -- --   08/24/23 1127 -- -- -- -- -- 97 % -- --   08/24/23 1122 -- -- -- -- -- 97 % -- --   08/24/23 1117 -- -- -- -- -- 96 % -- --   08/24/23 1112 -- -- -- -- -- 97 % -- --   08/24/23 1107 -- -- -- -- -- 97 % -- --   08/24/23 1100 135/79 -- -- -- 16 97 % -- --   08/24/23 1010 132/76 -- -- -- 16 96 % -- --   08/24/23 0955 137/69 -- -- -- 16 96 % -- --   08/24/23 0940 121/69 -- -- -- 16 95 % -- --   08/24/23 0920 110/64 -- -- -- 18 95 % -- --   08/24/23 0900 (!) 141/84 98.1  F (36.7  C) Oral -- 18 97 % -- --   08/24/23 0730 (!) 143/76 -- -- -- -- -- -- --   08/24/23 0625 (!) 144/83 -- -- -- -- -- -- --   08/24/23 0531 114/70 -- -- -- -- -- -- --   08/24/23 0425 103/73 98.1  F (36.7  C) Oral -- 18 98 % -- --   08/24/23 0323 124/64 -- -- -- -- -- -- --   08/24/23 0308 109/62 -- -- -- -- -- -- --   08/24/23 0233 114/78 -- -- -- -- -- -- --   08/24/23 0203 104/67 -- -- -- -- -- -- --   08/24/23 0125 108/67 -- -- -- -- -- -- --   08/24/23 0109 137/82 -- -- -- -- -- -- --   08/24/23 0054 137/85 -- -- -- -- -- -- --   08/24/23 0039 118/76 -- -- -- -- -- -- --   08/24/23 0024 127/82 97.8  F (36.6  C) Oral -- 16 -- -- --   08/24/23 0006 115/73 -- -- -- -- -- -- --   08/23/23 2356 121/77 -- -- -- -- -- -- --   08/23/23 2346 121/77 -- -- -- -- -- -- --    23 2336 134/83 -- -- -- -- -- -- --   23 2326 131/87 -- -- -- -- -- -- --   23 2316 127/83 -- -- -- -- -- -- --   23 2307 (!) 156/83 -- -- -- -- -- -- --   23 2253 126/81 -- -- -- -- -- -- --   23 2243 (!) 179/93 -- -- -- -- -- -- --   23 2206 (!) 156/99 -- -- -- -- -- -- --   23 215 (!) 154/93 -- -- -- -- -- -- --   23 213 (!) 174/100 -- -- -- -- -- -- --   23 (!) 143/92 -- -- -- -- -- -- --   23 (!) 150/83 98.7  F (37.1  C) Oral 73 18 -- 1.524 m (5') 69.9 kg (154 lb)     Blood pressure range: Systolic (24hrs), Av , Min:119 , Max:180     Blood pressure range: Diastolic (24hrs), Av, Min:68, Max:98    Uterine fundus is firm, non-tender and at the level of the umbilicus  Heart is regular rate and rhythm and lungs clear to auscultation          Data:   All laboratory data related to this surgery reviewed  Hemoglobin   Date Value Ref Range Status   2023 9.3 (L) 11.7 - 15.7 g/dL Final   2023 9.7 (L) 11.7 - 15.7 g/dL Final   2023 10.9 (L) 11.7 - 15.7 g/dL Final   2023 13.2 11.7 - 15.7 g/dL Final   2023 12.2 11.7 - 15.7 g/dL Final       Aaliyah Boston MD 2023  [facility].  [phone]

## 2023-08-26 NOTE — DISCHARGE SUMMARY
[facility] Discharge Summary    Earnestine Guillen MRN# [id number]   Age: 42 year old YOB: 1980     Date of Admission:  8/23/2023  Date of Discharge::  8/26/2023  Admitting Physician:  Rebecca Mckenna MD  Discharge Physician:  Aaliyah Boston MD     Home clinic: [facility]           Admission Diagnoses:   Encounter for triage in pregnant patient [Z36.89]  Hypertension complicating pregnancy [O16.9]          Discharge Diagnosis:     Normal spontaneous vaginal delivery  Intrauterine pregnancy at 38 weeks gestation  Hypertension postpartum          Procedures:     Procedure(s): No additional procedures performed       No procedures performed during this admission           Medications Prior to Admission:     Medications Prior to Admission   Medication Sig Dispense Refill Last Dose    aspirin (ASA) 81 MG EC tablet Take 1 tablet (81 mg) by mouth daily 90 tablet 1 8/23/2023 at 0800    azaTHIOprine (IMURAN) 50 MG tablet Take 50 mg by mouth daily   8/23/2023 at 0800    hydroxychloroquine (PLAQUENIL) 200 MG tablet TAKE 1 & 1/2 TABLETS (300 MG) BY MOUTH ONCE DAILY.   8/23/2023 at 0800    Prenatal Vit-Fe Fumarate-FA (PRENATAL MULTIVITAMIN W/IRON) 27-0.8 MG tablet Take 1 tablet by mouth daily   8/23/2023 at 0800    Acetone, Urine, Test (KETONE TEST) STRP 1 each by In Vitro route daily 50 strip 3     blood glucose (NO BRAND SPECIFIED) lancets standard Use to test blood sugar 4 times daily or as directed. 400 each 1     blood glucose (NO BRAND SPECIFIED) test strip Use to test blood sugar 4 times daily or as directed. 400 strip 1     blood glucose monitoring (ONETOUCH VERIO) meter device kit Use to test blood sugar 4-6 times daily or as directed. 1 kit 0     spacer ([facility]) holding chamber FOR HOME USE.       SUMAtriptan (IMITREX) 100 MG tablet Take 1 tablet (100 mg) by mouth at onset of headache for migraine 10 tablet 11              Discharge Medications:     Current  Discharge Medication List        CONTINUE these medications which have NOT CHANGED    Details   aspirin (ASA) 81 MG EC tablet Take 1 tablet (81 mg) by mouth daily  Qty: 90 tablet, Refills: 1    Associated Diagnoses: Supervision of high-risk pregnancy of elderly multigravida      azaTHIOprine (IMURAN) 50 MG tablet Take 50 mg by mouth daily      hydroxychloroquine (PLAQUENIL) 200 MG tablet TAKE 1 & 1/2 TABLETS (300 MG) BY MOUTH ONCE DAILY.      Prenatal Vit-Fe Fumarate-FA (PRENATAL MULTIVITAMIN W/IRON) 27-0.8 MG tablet Take 1 tablet by mouth daily      Acetone, Urine, Test (KETONE TEST) STRP 1 each by In Vitro route daily  Qty: 50 strip, Refills: 3    Associated Diagnoses: Gestational diabetes mellitus (GDM) in first trimester, gestational diabetes method of control unspecified      blood glucose (NO BRAND SPECIFIED) lancets standard Use to test blood sugar 4 times daily or as directed.  Qty: 400 each, Refills: 1    Associated Diagnoses: Supervision of high-risk pregnancy of elderly multigravida      blood glucose (NO BRAND SPECIFIED) test strip Use to test blood sugar 4 times daily or as directed.  Qty: 400 strip, Refills: 1    Associated Diagnoses: Supervision of high-risk pregnancy of elderly multigravida      blood glucose monitoring (ONETOUCH VERIO) meter device kit Use to test blood sugar 4-6 times daily or as directed.  Qty: 1 kit, Refills: 0    Associated Diagnoses: Gestational diabetes mellitus (GDM) in first trimester, gestational diabetes method of control unspecified      spacer (OPTICHAMBER SHELBY) holding chamber FOR HOME USE.      SUMAtriptan (IMITREX) 100 MG tablet Take 1 tablet (100 mg) by mouth at onset of headache for migraine  Qty: 10 tablet, Refills: 11    Associated Diagnoses: Migraine with aura and without status migrainosus, not intractable                   Consultations:   No consultations were requested during this admission            Hospital Course:   The patient's hospital course was  unremarkable.  On discharge, her pain was well controlled. Vaginal bleeding is similar to peak menstrual flow.  Voiding without difficulty.  Ambulating well and tolerating a normal diet.  No fever.  Breastfeeding well.  Infant is stable.  Normal bowel movement  She was discharged on post-partum day #1.    [place]y :  Patient BP was in sever range which required treatment with rapid release Nifedipine , and delayed her discharge till evening . Patient got 60 mg of ER nifedipine as well as 10 mg rapid release per order set to manage preeclampsia with sever features   Patient stayed stable through out the stay with no other symptoms . All labs were in good range   Patient was also educated to check her BP at home and blood pressure monitor RX was given  Warning signs discussed and when to seek attention urgently .    Post-partum hemoglobin:   Hemoglobin   Date Value Ref Range Status   08/26/2023 10.8 (L) 11.7 - 15.7 g/dL Final             Discharge Instructions and Follow-Up:     Discharge diet: Regular   Discharge activity: Lifting restricted to 15 pounds   Discharge follow-up: Follow up with primary care provider in 6 weeks   Wound care: Ice to area for comfort           Discharge Disposition:     Discharged to home      Attestation:  I have reviewed today's vital signs, notes, medications, labs and imaging.  Face-to-face time: 25 minutes  Total time: 45 minutes    Aaliyah Boston MD 8/26/2023   [facility].  [phone]

## 2023-08-27 VITALS
HEIGHT: 60 IN | TEMPERATURE: 98 F | RESPIRATION RATE: 16 BRPM | BODY MASS INDEX: 30.23 KG/M2 | OXYGEN SATURATION: 97 % | HEART RATE: 78 BPM | SYSTOLIC BLOOD PRESSURE: 121 MMHG | WEIGHT: 154 LBS | DIASTOLIC BLOOD PRESSURE: 74 MMHG

## 2023-08-27 LAB
AST SERPL W P-5'-P-CCNC: 19 U/L (ref 0–40)
CREAT SERPL-MCNC: 0.73 MG/DL (ref 0.6–1.1)
GFR SERPL CREATININE-BSD FRML MDRD: >90 ML/MIN/1.73M2
HGB BLD-MCNC: 10.1 G/DL (ref 11.7–15.7)
PLATELET # BLD AUTO: 124 10E3/UL (ref 150–450)

## 2023-08-27 PROCEDURE — 82565 ASSAY OF CREATININE: CPT | Performed by: FAMILY MEDICINE

## 2023-08-27 PROCEDURE — 36415 COLL VENOUS BLD VENIPUNCTURE: CPT | Performed by: FAMILY MEDICINE

## 2023-08-27 PROCEDURE — 85049 AUTOMATED PLATELET COUNT: CPT | Performed by: FAMILY MEDICINE

## 2023-08-27 PROCEDURE — 250N000013 HC RX MED GY IP 250 OP 250 PS 637

## 2023-08-27 PROCEDURE — 250N000012 HC RX MED GY IP 250 OP 636 PS 637

## 2023-08-27 PROCEDURE — 85018 HEMOGLOBIN: CPT | Performed by: FAMILY MEDICINE

## 2023-08-27 PROCEDURE — 250N000013 HC RX MED GY IP 250 OP 250 PS 637: Performed by: FAMILY MEDICINE

## 2023-08-27 PROCEDURE — 84450 TRANSFERASE (AST) (SGOT): CPT | Performed by: FAMILY MEDICINE

## 2023-08-27 PROCEDURE — 99207 PR SUBSEQUENT HOSPITAL CARE FOR MOTHER, 15 MINUTES: CPT | Performed by: FAMILY MEDICINE

## 2023-08-27 RX ADMIN — NIFEDIPINE 30 MG: 30 TABLET, FILM COATED, EXTENDED RELEASE ORAL at 08:52

## 2023-08-27 RX ADMIN — AZATHIOPRINE 50 MG: 50 TABLET ORAL at 09:12

## 2023-08-27 RX ADMIN — IBUPROFEN 800 MG: 800 TABLET ORAL at 04:31

## 2023-08-27 RX ADMIN — DOCUSATE SODIUM 100 MG: 100 CAPSULE, LIQUID FILLED ORAL at 08:49

## 2023-08-27 RX ADMIN — HYDROXYCHLOROQUINE SULFATE 300 MG: 200 TABLET ORAL at 09:12

## 2023-08-27 ASSESSMENT — ACTIVITIES OF DAILY LIVING (ADL)
ADLS_ACUITY_SCORE: 20

## 2023-08-27 NOTE — DISCHARGE INSTRUCTIONS
Warning Signs after Having a Baby    Keep this paper on your fridge or somewhere else where you can see it.    Call your provider if you have any of these symptoms up to 12 weeks after having your baby.    Thoughts of hurting yourself or your baby  Pain in your chest or trouble breathing  Severe headache not helped by pain medicine  Eyesight concerns (blurry vision, seeing spots or flashes of light, other changes to eyesight)  Fainting, shaking or other signs of a seizure    Call 9-1-1 if you feel that it is an emergency.     The symptoms below can happen to anyone after giving birth. They can be very serious. Call your provider if you have any of these warning signs.    My provider s phone number: _______________________    Losing too much blood (hemorrhage)    Call your provider if you soak through a pad in less than an hour or pass blood clots bigger than a golf ball. These may be signs that you are bleeding too much.    Blood clots in the legs or lungs    After you give birth, your body naturally clots its blood to help prevent blood loss. Sometimes this increased clotting can happen in other areas of the body, like the legs or lungs. This can block your blood flow and be very dangerous.     Call your provider if you:  Have a red, swollen spot on the back of your leg that is warm or painful when you touch it.   Are coughing up blood.     Infection    Call your provider if you have any of these symptoms:  Fever of 100.4 F (38 C) or higher.  Pain or redness around your stitches if you had an incision.   Any yellow, white, or green fluid coming from places where you had stitches or surgery.    Mood Problems (postpartum depression)    Many people feel sad or have mood changes after having a baby. But for some people, these mood swings are worse.     Call your provider right away if you feel so anxious or nervous that you can't care for yourself or your baby.    Preeclampsia (high blood pressure)    Even if you  didn't have high blood pressure when you were pregnant, you are at risk for the high blood pressure disease called preeclampsia. This risk can last up to 12 weeks after giving birth.     Call your provider if you have:   Pain on your right side under your rib cage  Sudden swelling in the hands and face    Remember: You know your body. If something doesn't feel right, get medical help.     For informational purposes only. Not to replace the advice of your health care provider. Copyright 2020 [facility]. All rights reserved. Clinically reviewed by Emeli Rivera, RNC-OB, MSN. Real Time Translation [id number] - Rev 02/23.    Postpartum Vaginal Delivery Instructions    Activity     Ask family and friends for help when you need it.  Do not place anything in your vagina for 6 weeks.  You are not restricted on other activities, but take it easy for a few weeks to allow your body to recover from delivery.  You are able to do any activities you feel up to that point.  No driving until you have stopped taking your pain medications (usually two weeks after delivery).     Call your health care provider if you have any of these symptoms:     Increased pain, swelling, redness, or fluid around your stiches from an episiotomy or perineal tear.  A fever above 100.4 F (38 C) with or without chills when placing a thermometer under your tongue.  You soak a sanitary pad with blood within 1 hour, or you see blood clots larger than a golf ball.  Bleeding that lasts more than 6 weeks.  Vaginal discharge that smells bad.  Severe pain, cramping or tenderness in your lower belly area.  A need to urinate more frequently (use the toilet more often), more urgently (use the toilet very quickly), or it burns when you urinate.  Nausea and vomiting.  Redness, swelling or pain around a vein in your leg.  Problems breastfeeding or a red or painful area on your breast.  Chest pain and cough or are gasping for air.  Problems coping with sadness,  "anxiety, or depression.  If you have any concerns about hurting yourself or the baby, call your provider immediately.   You have questions or concerns after you return home.     Keep your hands clean:  Always wash your hands before touching your perineal area and stitches.  This helps reduce your risk of infection.  If your hands aren't dirty, you may use an alcohol hand-rub to clean your hands. Keep your nails clean and short.      Know Your Blood Pressure Numbers  For patients who've had a high blood pressure disorder of pregnancy  What to know about high blood pressure disorders of pregnancy  People who had high blood pressure during pregnancy may continue to have high blood pressure for up to 12 weeks after pregnancy. It can also raise your lifetime risk of chronic high blood pressure, heart disease and blood vessel disease. It is vital that you keep monitoring your blood pressure and taking steps to control it.   The general guidelines below are what your blood pressures mean.  A heart healthy lifestyle that includes blood pressure control can help reduce these risks. A good blood pressure goal is less than 130/80 mmHg long-term.  Talk to your provider about high blood pressure disorder of pregnancy and what this means for your lifelong health.   Know your numbers  Read \"Checking Your Blood Pressure at Home\" to learn the best way to take your blood pressure.  Refer to the next page for general guidelines about what your blood pressures mean and what to do about them. Follow these instructions unless your provider tells you something different.  For more resources, visit www.preeclampsia.org.  What to do if your blood pressure is high  Act right away if you have numbers in the yellow or red range--don't wait for a scheduled appointment.  When to call your provider  Regardless of your blood pressure, call your healthcare provider right away if you develop any of these symptoms:  Severe headache  Chest " "pain  Trouble breathing  Stomach pain  Changes in vision  Swelling in your hands and face.  Please say, \"I am having symptoms of high blood pressure. My provider told me to call and ask to be seen right away when I have these symptoms.\"  If you ARE pregnant OR   it has been less than 12 weeks since you delivered  Systolic pressure (top number) is....  Diastolic (bottom number) is.... Your blood pressure is....   160 or higher  or 110 or higher VERY HIGH. Check it again in 10 minutes, then contact your provider.   140 - 159  or 90 - 109 HIGH. Keep checking blood pressure 2 times a day. If your blood pressure is in this range for 2 readings, contact your provider within 24 hours. We will discuss starting or increasing your blood pressure medicine.   100 - 139  and 60 - 89 NORMAL. Your blood pressure looks great!   Keep checking it 2 times a day.   Less than 100  or Less than 60 LOW. Check your blood pressure again in   10 minutes, then contact your provider. We may need to make changes to your blood pressure medicine.     Call your provider right away if you have these symptoms: a severe headache, vision changes, shortness of breath, chest pain, or right upper belly pain. Call even if your blood pressure is okay.  Call 9-1-1 if you feel the symptoms are severe and that it is an emergency.   If you are NOT pregnant OR   it has been more than 12 weeks since you delivered  Systolic pressure (top number) is....  Diastolic (bottom number) is.... Your blood pressure is....   Higher than 180 and/or Higher than 120 Hypertensive crisis. Call your doctor right away.   140 or higher or  90 or higher Hypertension Stage 2. Follow up with your provider.   130-139 or Less than 80 Hypertension Stage 1. Follow up with your provider.   120-129 and Less than 80 Elevated blood pressure (pre-hypertension). You are at higher risk of developing high blood pressure. Follow up with your provider.   Less than 120 and Less than 80 Normal.     " Call your provider right away if you have these symptoms: a severe headache, vision changes, shortness of breath, chest pain, or right upper belly pain. Call even if your blood pressure is okay.  Call 9-1-1 if you feel the symptoms are severe and that it is an emergency.   For informational purposes only. Not to replace the advice of your health care provider. Copyright   2023 [facility]. All rights reserved. Clinically reviewed by [facility]. [email or web address] [id number] - 03/23.

## 2023-08-27 NOTE — DISCHARGE SUMMARY
Aaliyah Boston MD  Physician  OB/Gyn     Discharge Summary     Signed     Date of Service: 8/27/2023 8:28 AM   Creation Time: 8/26/2023 10:41 AM     Expand All [facility] All    [facility] Discharge Summary     Earnestine Guillen MRN# [id number]   Age: 42 year old YOB: 1980      Date of Admission:                  8/23/2023  Date of Discharge::                 8/27/2023  Admitting Physician:               Rebecca Mckenna MD  Discharge Physician:              Aaliyah Boston MD     Home clinic:    [facility]           Admission Diagnoses:   Encounter for triage in pregnant patient [Z36.89]  Hypertension complicating pregnancy [O16.9]  Preeclampsia with sever features           Discharge Diagnosis:      Normal spontaneous vaginal delivery  Intrauterine pregnancy at 38 weeks gestation  Hypertension postpartum          Procedures:      Procedure(s): No additional procedures performed       No procedures performed during this admission           Medications Prior to Admission:      Prescriptions Prior to Admission           Medications Prior to Admission   Medication Sig Dispense Refill Last Dose    aspirin (ASA) 81 MG EC tablet Take 1 tablet (81 mg) by mouth daily 90 tablet 1 8/23/2023 at 0800    azaTHIOprine (IMURAN) 50 MG tablet Take 50 mg by mouth daily     8/23/2023 at 0800    hydroxychloroquine (PLAQUENIL) 200 MG tablet TAKE 1 & 1/2 TABLETS (300 MG) BY MOUTH ONCE DAILY.     8/23/2023 at 0800    Prenatal Vit-Fe Fumarate-FA (PRENATAL MULTIVITAMIN W/IRON) 27-0.8 MG tablet Take 1 tablet by mouth daily     8/23/2023 at 0800    Acetone, Urine, Test (KETONE TEST) STRP 1 each by In Vitro route daily 50 strip 3      blood glucose (NO BRAND SPECIFIED) lancets standard Use to test blood sugar 4 times daily or as directed. 400 each 1      blood glucose (NO BRAND SPECIFIED) test strip Use to test blood sugar 4 times daily or as directed. 400 strip 1      blood glucose monitoring (ONETOUCH  VERIO) meter device kit Use to test blood sugar 4-6 times daily or as directed. 1 kit 0      spacer (OPTICHAMBER SHELBY) holding chamber FOR HOME USE.          SUMAtriptan (IMITREX) 100 MG tablet Take 1 tablet (100 mg) by mouth at onset of headache for migraine 10 tablet 11              CONTINUE these medications which have NOT CHANGED     Details   aspirin (ASA) 81 MG EC tablet Take 1 tablet (81 mg) by mouth daily  Qty: 90 tablet, Refills: 1     Associated Diagnoses: Supervision of high-risk pregnancy of elderly multigravida       azaTHIOprine (IMURAN) 50 MG tablet Take 50 mg by mouth daily       hydroxychloroquine (PLAQUENIL) 200 MG tablet TAKE 1 & 1/2 TABLETS (300 MG) BY MOUTH ONCE DAILY.       Prenatal Vit-Fe Fumarate-FA (PRENATAL MULTIVITAMIN W/IRON) 27-0.8 MG tablet Take 1 tablet by mouth daily       Acetone, Urine, Test (KETONE TEST) STRP 1 each by In Vitro route daily  Qty: 50 strip, Refills: 3     Associated Diagnoses: Gestational diabetes mellitus (GDM) in first trimester, gestational diabetes method of control unspecified       blood glucose (NO BRAND SPECIFIED) lancets standard Use to test blood sugar 4 times daily or as directed.  Qty: 400 each, Refills: 1     Associated Diagnoses: Supervision of high-risk pregnancy of elderly multigravida       blood glucose (NO BRAND SPECIFIED) test strip Use to test blood sugar 4 times daily or as directed.  Qty: 400 strip, Refills: 1     Associated Diagnoses: Supervision of high-risk pregnancy of elderly multigravida       blood glucose monitoring (ONETOUCH VERIO) meter device kit Use to test blood sugar 4-6 times daily or as directed.  Qty: 1 kit, Refills: 0     Associated Diagnoses: Gestational diabetes mellitus (GDM) in first trimester, gestational diabetes method of control unspecified       spacer (OPTICHAMBER SHELBY) holding chamber FOR HOME USE.       SUMAtriptan (IMITREX) 100 MG tablet Take 1 tablet (100 mg) by mouth at onset of headache for migraine  Qty:  10 tablet, Refills: 11     Associated Diagnoses: Migraine with aura and without status migrainosus, not intractable                      Consultations:   No consultations were requested during this admission          Medication List        Started      docusate sodium 100 MG capsule  Commonly known as: COLACE  100 mg, Oral, DAILY     ibuprofen 800 MG tablet  Commonly known as: ADVIL/MOTRIN  800 mg, Oral, ONCE PRN     NIFEdipine ER 60 MG 24 hr tablet  Commonly known as: ADALAT CC  60 mg, Oral, DAILY                  Hospital Course:   The patient's hospital course was unremarkable.  On discharge, her pain was well controlled. Vaginal bleeding is similar to peak menstrual flow.  Voiding without difficulty.  Ambulating well and tolerating a normal diet.  No fever.  Breastfeeding well.  Infant is stable.  Normal bowel movement  She was discharged on post-partum day #1.     Summery :  Patient BP was in sever range which required treatment with rapid release Nifedipine , and delayed her discharge till evening . Patient got 60 mg of ER nifedipine as well as 10 mg rapid release per order set to manage preeclampsia with sever features   Patient stayed stable through out the stay with no other symptoms . All labs were in good range   Patient was also educated to check her BP at home and blood pressure monitor RX was given  Warning signs discussed and when to seek attention urgently .     Post-partum hemoglobin:         Hemoglobin   Date Value Ref Range Status   08/26/2023 10.8 (L) 11.7 - 15.7 g/dL Final              Discharge Instructions and Follow-Up:      Discharge diet: Regular   Discharge activity: Lifting restricted to 15 pounds   Discharge follow-up: Follow up with primary care provider in 6 weeks   Wound care: Ice to area for comfort           Discharge Disposition:      Discharged to home      Attestation:  I have reviewed today's vital signs, notes, medications, labs and imaging.  Face-to-face time: 25  minutes  Total time: 45 minutes     Aaliyah Boston MD [facility].  [phone]             Routing History

## 2023-08-27 NOTE — PLAN OF CARE
Goal Outcome Evaluation:      Plan of Care Reviewed With: patient, spouse      Pt bonding with baby and vitals stable. Her fundus is midline and firm without massage and bleeding is scant. Her pain is well managed with tylenol and ibuprofen. She is breastfeeding baby and plans to breastfeed at home as well as pump and bottle feed. Her BPs have been well managed today. She will be going home with blood pressure cuff and BP medication.      Discharge education given to pt and spouse and question answered. Pt confirms understanding of discharge teaching. Pt was walked to front door by nurse and went home with baby and spouse.             Problem: Plan of Care - These are the overarching goals to be used throughout the patient stay.    Goal: Optimal Comfort and Wellbeing  Intervention: Provide Person-Centered Care  Recent Flowsheet Documentation  Taken 8/27/2023 0845 by Susan Horton, RN  Trust Relationship/Rapport:   care explained   questions answered   questions encouraged     Problem: Plan of Care - These are the overarching goals to be used throughout the patient stay.    Goal: Absence of Hospital-Acquired Illness or Injury  Intervention: Prevent Skin Injury  Recent Flowsheet Documentation  Taken 8/27/2023 0845 by Susan Horton, RN  Body Position: position changed independently

## 2023-08-27 NOTE — PLAN OF CARE
"  Problem: Plan of Care - These are the overarching goals to be used throughout the patient stay.    Goal: Plan of Care Review  Description: The Plan of Care Review/Shift note should be completed every shift.  The Outcome Evaluation is a brief statement about your assessment that the patient is improving, declining, or no change.  This information will be displayed automatically on your shift note.  Outcome: Progressing  Flowsheets (Taken 8/27/2023 0437)  Plan of Care Reviewed With: patient     Problem: Postpartum (Vaginal Delivery)  Goal: Optimal Pain Control and Function  Intervention: Prevent or Manage Pain  Recent Flowsheet Documentation  Taken 8/27/2023 0000 by Erum Daly RN  Perineal Care: absorbent brief/pad changed   Goal Outcome Evaluation:  Postpartum GHTN Recovery  Routine postpartum care with blood protocol monitoring  \"My pain is at 6/10 right now that I am breast feeding.\"  Patient is in bed breast feeding her infant at present, vitals are stable, one time elevated SBP on the shift, but she denies any headache, vision changes or RUQ pains. Fundus midline, firm, at 1 below the umbilicus and lochia is scant. Voiding spontaneously without problems.Caring and loving towards her infant.      Plan of Care Reviewed With: patient                 "

## 2023-08-27 NOTE — PLAN OF CARE
Goal Outcome Evaluation:       Pt bonding with baby and vitals stable. Her fundus is midline and firm without massage and bleeding is scant. Her pain is well managed with tylenol and ibuprofen. She is breastfeeding baby and plans to breastfeed at home as well as pump and bottle feed. Her blood pressures were elevated earlier today and nurse administered nifedipine per providers orders. The last 5 values have been within acceptable range.

## 2023-08-28 ENCOUNTER — OFFICE VISIT (OUTPATIENT)
Dept: FAMILY MEDICINE | Facility: CLINIC | Age: 43
End: 2023-08-28
Payer: COMMERCIAL

## 2023-08-28 VITALS
OXYGEN SATURATION: 96 % | WEIGHT: 144.8 LBS | BODY MASS INDEX: 28.28 KG/M2 | DIASTOLIC BLOOD PRESSURE: 72 MMHG | HEART RATE: 87 BPM | SYSTOLIC BLOOD PRESSURE: 124 MMHG

## 2023-08-28 DIAGNOSIS — O14.13 SEVERE PRE-ECLAMPSIA IN THIRD TRIMESTER: Primary | ICD-10-CM

## 2023-08-28 PROCEDURE — 99024 POSTOP FOLLOW-UP VISIT: CPT | Performed by: FAMILY MEDICINE

## 2023-08-28 NOTE — PROGRESS NOTES
Assessment & Plan     Severe pre-eclampsia in third trimester  Post partum visit on day 3; Bps are not in severe range at this time (<160/110); at this point ok to continue nifedipine 60mg ER daily; reviewed goals and likely may need this for several days/weeks with typical improvement by 12 weeks and past that may need for ongoing chronic HTN if that persists. We reviewed her labs and at this point defer additional testing given just done the other day. Will follow up next week informally at her son's  recheck visit with me. She will bring list of BP readings at that time.                BMI:   Estimated body mass index is 28.28 kg/m  as calculated from the following:    Height as of 23: 1.524 m (5').    Weight as of this encounter: 65.7 kg (144 lb 12.8 oz).           Rebecca Mckenna MD  [facility]    Meme Colby is a 42 year old, presenting for the following health issues:  Hypertension (Follow up on blood pressures- 137-150s)        2023    12:34 PM   Additional Questions   Roomed by Hermelinda BONILLA LPN       HPI     She was admitted on 23 in early labor however was found to have asymptomatic but severe range blood pressures with subsequent diagnosis for preeclampsia with severe features.  She was treated with IV labetalol x3 doses during the admission as well as IV magnesium for that indication for seizure prophylaxis.  She was discharged on 60 mg of nifedipine extended release as her postpartum blood pressures returned to severe range but remained asymptomatic.   These normalized over the next 24 hours and so she was discharged to home yesterday with follow-up planned for today.      Her prenatal course is well-known to me and I was delivering physician.    Her platelet count is somewhat improved from admission with andrzej at 97,000 and discharged at 124,000.  This was a change about 2 days prior to her admission with new onset low platelet  count.  Hemoglobin at discharge was 10.1.  AST and ALT were normal.  She did not meet criteria for help syndrome.    She has had home Bps in the 140s systolic range or lower; with diastolic's in the 90s range. Today on arrival BP was 140/82 and improved to 124/72 before leaving.    She does have mild feet swelling since discharge (worse through the evening and better in the AM); she received a lot of IVFs and other IV products this admission we reviewed.     Her bleeding is normal for this early in post partum course- period like flow, no clots.       Mood is doing good right now; she was down yesterday when she had to stay the extra day but better now    She does state her family has hx of essential high blood pressure    She is undecided on birth control at this time. So far not leaning toward having more children. She may be interested in tubal ligation but declines referral at this time; considering vasectomy for her spouse as well.       Reviewed her propranolol allergy and taking off list: Could be an allergy with this or methocarbamol (see ER notes about these 2 new migraine meds she took prior to onset of her rash)  Tolerated labetalol IV as of 8/2023; removing propranolol from list for now            Review of Systems         Objective    BP (!) 140/82 (BP Location: Left arm, Patient Position: Sitting, Cuff Size: Adult Regular)   Pulse 87   Wt 65.7 kg (144 lb 12.8 oz)   LMP 12/02/2022 (Exact Date)   SpO2 96%   BMI 28.28 kg/m    Body mass index is 28.28 kg/m .  Physical Exam   GENERAL: healthy, alert and no distress  NECK: no adenopathy, no asymmetry, masses, or scars and thyroid normal to palpation  RESP: lungs clear to auscultation - no rales, rhonchi or wheezes  CV: regular rate and rhythm, normal S1 S2, no S3 or S4, no murmur, click or rub, no peripheral edema and peripheral pulses strong  ABDOMEN: soft, nontender, no hepatosplenomegaly, no masses and bowel sounds normal  MS: no gross  musculoskeletal defects noted, 1+ pitting edema at the ankles (reviewed all the fluids she received and improves through the night upon wakening)

## 2023-09-16 NOTE — PROGRESS NOTES
Return OB Visit     ASSESSMENT/PLAN    32w5d  KRISHNA 2020 by LMP c/w 1st tri us    Supervision of high-risk pregnancy of elderly multigravida in 3rdtrimester.  High risk conditions: AMA age 38, Auotimmune hepatitis, +SSA/SSB antibodies-Sjogren's; also with placenta previa (now low lying), GDM-A1  Followed by MFM- risk for fetal heart block, undergoing routine echos/PA intervals; risk for transient  lupus syndrome (rash) at birth- baby will need EKG  - Genetics testing offered, NIPT negative, declines further testing  - Comprehensive anatomy scan 19 normal aside from placenta previa  - Fetal echo and fetal PA intervals done at alternating visits with MFM, these have been normal  - Growth US done 19 with MFM, f/u in 5 weeks with growth/BPP/NST  - Flu shot given  - Tdap given  - Expecting baby boy- declines circ    Gestational diabetes, diet controlled. Failed 3hr testing. History of GDM x2, diet controlled previously.  She had 6 and 7 pound babies.   -Currently all blood sugars are at goal.  -Reviewed 1 hour postprandial goals less than 140 and 2-hour less than 120.  -Check blood glucose 4X per day, with daily ketones  -Continue GDM meal plan, 3 meals with snacks.  -Follow-up with diabetic educator/NP as scheduled     Placenta previa- now low lying.   Noted on u/s from . Intermittent spotting since 9/10 as a result of low lying placenta until about 23 weeks, then no further spotting/bleeding. Was on pelvic rest.    - Recent ultrasound  shows leading edge of placenta is 1.8cm from internal os. The placenta is low-lying (marginal venous sinus at the leading edge of placenta measure 1.8 cm from the internal os).  - Repeat u/s as scheduled with  MFM to confirm growth and placental location.      Sjogren's syndrome +SSA/SSB in pregnancy.  Stable. Followed by rheumatology, Dr. Neil.  - Continues on Plaquenil twice daily and eye drops    Hepatitis, autoimmune (H) on high risk  medication;  Stable. Dx 2018, initially tapered down on azathrioprine when she conceived, resulting in presumed flare during 1st trimester based on elevated LFTs/bile acids. GI and MFM following, unlikely cholestasis at this point.  - F/u with GI around March   - Rare use of hydroxyzine   - no prednisone indicated at this time    Post delivery cares:  Notify NICU at delivery given maternal +SSA/SSB antibody and risk for fetal heart block and transient  lupus syndrome (rash, etc).   Baby will need EKG  Mom needs Hep B vaccine? (unclear immune status as was previously immune then not, ? If med related)    Discussed:  - Pre-pregnancy BMI 27.    - Recommended weight gain of  25-35 lbs for normal BMI.  - Influenza vaccine  Given  -  labor sx/signs  - Recommendations for breastfeeding given     She will follow-up with me in 2 weeks     Rebecca Mckenna MD      SUBJECTIVE:  Earnestine Guillen is a 39 y.o.   female who presents to clinic for a return OB visit.     Interval update:   She did complete a course of metronidazole for BV and discharge is resolved.    Checking blood sugars 4x/day, all 1hr postprandial BS are <140, generally 120s- max was 138. She is eating 3 meals a day, her  is going to a vegan diet so she is adopted these changes as well.     She has had no further vaginal bleeding since about 23 weeks gestation.  Her placenta previa is now considered low-lying.    Regarding her hepatitis, no abdominal pain and itching has resolved.  No rash.  She is tolerating the Plaquenil for her Sjogren's.    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    MARIELOS is boyfriend. Her two older children (17yo, 20yo) have a different father than current SO.     Never smoker. No drugs/alcohol use.     Rebecca Mckenna MD       Active  Ambulatory Problems     Diagnosis Date Noted     Abnormal LFTs s/t autoimmune hepatitis      Cervical cancer screening- ASCUS/HPV+, repeat 8/2019- delay due to pregnancy 07/21/2017     Hepatitis, autoimmune (H) 09/06/2018     Migraine with aura and without status migrainosus, not intractable 07/23/2018     Need for hepatitis B vaccination 06/25/2019     Positive EMELIA (antinuclear antibody) 08/13/2018     Supervision of high-risk pregnancy of elderly multigravida 06/25/2019     Thrombocytopenia affecting pregnancy (H) 08/20/2019     SS-A and SS-B antibody positive 09/30/2019     Sjogren's syndrome (H)iwth ++ SSA/SSB 09/30/2019     Placenta previa in second trimester 10/01/2019     Diet controlled gestational diabetes mellitus (GDM) in second trimester 12/20/2019     Resolved Ambulatory Problems     Diagnosis Date Noted     Anemia      Leukopenia      UTI (urinary tract infection) 02/18/2018     Acute pyelonephritis      SIRS (systemic inflammatory response syndrome) (H)      Hydronephrosis, unspecified hydronephrosis type      PID (acute pelvic inflammatory disease)      Past Medical History:   Diagnosis Date     Anemia      Autoimmune hepatitis (H) 2018     Cervicalgia      Migraine      Transaminitis        The following portions of the patient's history were reviewed and updated as appropriate: allergies, current medications, past family history, past medical history, past social history, past surgical history and problem list.    Review of Systems  A 12 point comprehensive review of systems was negative except as noted.      PHYSICAL EXAM:  /76 (Patient Site: Left Arm, Patient Position: Sitting, Cuff Size: Adult Regular)   Pulse 92   Wt 145 lb 6.4 oz (66 kg)   LMP 05/15/2019 (Exact Date)   BMI 28.40 kg/m     GENERAL: Pleasant pregnant female, alert, well groomed.  SKIN: Warm and dry, without lesions or rashes  EYES: PERRLA, EOM intact  LUNGS: normal effort  HEART: regular rate  ABDOMEN: Soft without  masses. Fundus at 32cm, FHTs 137-144  : external exam, physiologic discharge  MUSCULOSKELETAL: Full range of motion.  EXTREMITIES: No edema. No significant varicosities.   Skin: no rash      Rebecca Mckenna MD   - - -

## 2023-09-19 DIAGNOSIS — O16.3 HYPERTENSION AFFECTING PREGNANCY IN THIRD TRIMESTER: ICD-10-CM

## 2023-09-25 RX ORDER — IBUPROFEN 800 MG/1
800 TABLET, FILM COATED ORAL
Qty: 30 TABLET | Refills: 0 | Status: SHIPPED | OUTPATIENT
Start: 2023-09-25

## 2023-09-26 ENCOUNTER — MEDICAL CORRESPONDENCE (OUTPATIENT)
Dept: HEALTH INFORMATION MANAGEMENT | Facility: CLINIC | Age: 43
End: 2023-09-26
Payer: COMMERCIAL

## 2023-09-26 DIAGNOSIS — O16.3 HYPERTENSION AFFECTING PREGNANCY IN THIRD TRIMESTER: ICD-10-CM

## 2023-09-26 RX ORDER — NIFEDIPINE 30 MG
30 TABLET, EXTENDED RELEASE ORAL DAILY
Qty: 60 TABLET | Refills: 0 | Status: SHIPPED | OUTPATIENT
Start: 2023-09-26 | End: 2023-10-05

## 2023-09-26 NOTE — TELEPHONE ENCOUNTER
Pt in with her son's [facility] visit today and Bps were <130/90s on AM check prior to nifedipine 60mg administration. Advised to reduce dose to 30mg and will see me back in clinic in 2 weeks. Continue to monitor BP. Pt understanding of plan.

## 2023-10-05 ENCOUNTER — PRENATAL OFFICE VISIT (OUTPATIENT)
Dept: FAMILY MEDICINE | Facility: CLINIC | Age: 43
End: 2023-10-05
Payer: COMMERCIAL

## 2023-10-05 VITALS
BODY MASS INDEX: 26.45 KG/M2 | SYSTOLIC BLOOD PRESSURE: 130 MMHG | TEMPERATURE: 97.9 F | DIASTOLIC BLOOD PRESSURE: 85 MMHG | WEIGHT: 134.7 LBS | HEIGHT: 60 IN | OXYGEN SATURATION: 96 % | HEART RATE: 56 BPM

## 2023-10-05 DIAGNOSIS — Z23 NEED FOR HEPATITIS B VACCINATION: ICD-10-CM

## 2023-10-05 DIAGNOSIS — O24.410 DIET CONTROLLED GESTATIONAL DIABETES MELLITUS (GDM) IN THIRD TRIMESTER: ICD-10-CM

## 2023-10-05 DIAGNOSIS — O14.13 SEVERE PRE-ECLAMPSIA IN THIRD TRIMESTER: ICD-10-CM

## 2023-10-05 DIAGNOSIS — R87.810 ASCUS WITH POSITIVE HIGH RISK HPV CERVICAL: ICD-10-CM

## 2023-10-05 DIAGNOSIS — Z30.09 BIRTH CONTROL COUNSELING: ICD-10-CM

## 2023-10-05 DIAGNOSIS — O16.3 HYPERTENSION AFFECTING PREGNANCY IN THIRD TRIMESTER: ICD-10-CM

## 2023-10-05 DIAGNOSIS — K75.4 HEPATITIS, AUTOIMMUNE (H): Chronic | ICD-10-CM

## 2023-10-05 DIAGNOSIS — R87.610 ASCUS WITH POSITIVE HIGH RISK HPV CERVICAL: ICD-10-CM

## 2023-10-05 DIAGNOSIS — Z12.4 SCREENING FOR CERVICAL CANCER: ICD-10-CM

## 2023-10-05 DIAGNOSIS — N39.3 URINARY, INCONTINENCE, STRESS FEMALE: ICD-10-CM

## 2023-10-05 DIAGNOSIS — Z01.818 TUBAL LIGATION EVALUATION: ICD-10-CM

## 2023-10-05 PROBLEM — Z36.89 ENCOUNTER FOR TRIAGE IN PREGNANT PATIENT: Status: RESOLVED | Noted: 2023-08-23 | Resolved: 2023-10-05

## 2023-10-05 LAB
ALBUMIN SERPL BCG-MCNC: 4.3 G/DL (ref 3.5–5.2)
ALP SERPL-CCNC: 115 U/L (ref 35–104)
ALT SERPL W P-5'-P-CCNC: 21 U/L (ref 0–50)
ANION GAP SERPL CALCULATED.3IONS-SCNC: 13 MMOL/L (ref 7–15)
AST SERPL W P-5'-P-CCNC: 27 U/L (ref 0–45)
BILIRUB SERPL-MCNC: 0.6 MG/DL
BUN SERPL-MCNC: 14.6 MG/DL (ref 6–20)
CALCIUM SERPL-MCNC: 9 MG/DL (ref 8.6–10)
CHLORIDE SERPL-SCNC: 103 MMOL/L (ref 98–107)
CREAT SERPL-MCNC: 0.59 MG/DL (ref 0.51–0.95)
DEPRECATED HCO3 PLAS-SCNC: 22 MMOL/L (ref 22–29)
EGFRCR SERPLBLD CKD-EPI 2021: >90 ML/MIN/1.73M2
ERYTHROCYTE [DISTWIDTH] IN BLOOD BY AUTOMATED COUNT: 13.2 % (ref 10–15)
GLUCOSE SERPL-MCNC: 92 MG/DL (ref 70–99)
HBA1C MFR BLD: 5.5 % (ref 0–5.6)
HCT VFR BLD AUTO: 38.7 % (ref 35–47)
HGB BLD-MCNC: 12.8 G/DL (ref 11.7–15.7)
MCH RBC QN AUTO: 26.4 PG (ref 26.5–33)
MCHC RBC AUTO-ENTMCNC: 33.1 G/DL (ref 31.5–36.5)
MCV RBC AUTO: 80 FL (ref 78–100)
PLATELET # BLD AUTO: 170 10E3/UL (ref 150–450)
POTASSIUM SERPL-SCNC: 4.2 MMOL/L (ref 3.4–5.3)
PROT SERPL-MCNC: 7.8 G/DL (ref 6.4–8.3)
RBC # BLD AUTO: 4.84 10E6/UL (ref 3.8–5.2)
SODIUM SERPL-SCNC: 138 MMOL/L (ref 135–145)
WBC # BLD AUTO: 4.6 10E3/UL (ref 4–11)

## 2023-10-05 PROCEDURE — 87624 HPV HI-RISK TYP POOLED RSLT: CPT | Performed by: FAMILY MEDICINE

## 2023-10-05 PROCEDURE — 80053 COMPREHEN METABOLIC PANEL: CPT | Performed by: FAMILY MEDICINE

## 2023-10-05 PROCEDURE — 99214 OFFICE O/P EST MOD 30 MIN: CPT | Mod: 24 | Performed by: FAMILY MEDICINE

## 2023-10-05 PROCEDURE — 90471 IMMUNIZATION ADMIN: CPT | Performed by: FAMILY MEDICINE

## 2023-10-05 PROCEDURE — 90686 IIV4 VACC NO PRSV 0.5 ML IM: CPT | Performed by: FAMILY MEDICINE

## 2023-10-05 PROCEDURE — 85027 COMPLETE CBC AUTOMATED: CPT | Performed by: FAMILY MEDICINE

## 2023-10-05 PROCEDURE — G0124 SCREEN C/V THIN LAYER BY MD: HCPCS | Performed by: PATHOLOGY

## 2023-10-05 PROCEDURE — 99207 PR POST PARTUM EXAM: CPT | Performed by: FAMILY MEDICINE

## 2023-10-05 PROCEDURE — 91320 SARSCV2 VAC 30MCG TRS-SUC IM: CPT | Performed by: FAMILY MEDICINE

## 2023-10-05 PROCEDURE — 90480 ADMN SARSCOV2 VAC 1/ONLY CMP: CPT | Performed by: FAMILY MEDICINE

## 2023-10-05 PROCEDURE — G0145 SCR C/V CYTO,THINLAYER,RESCR: HCPCS | Performed by: FAMILY MEDICINE

## 2023-10-05 PROCEDURE — 36415 COLL VENOUS BLD VENIPUNCTURE: CPT | Performed by: FAMILY MEDICINE

## 2023-10-05 PROCEDURE — 83036 HEMOGLOBIN GLYCOSYLATED A1C: CPT | Performed by: FAMILY MEDICINE

## 2023-10-05 RX ORDER — NORELGESTROMIN AND ETHINYL ESTRADIOL 35; 150 UG/MG; UG/MG
PATCH TRANSDERMAL
Qty: 3 PATCH | Refills: 3 | Status: SHIPPED | OUTPATIENT
Start: 2023-10-05

## 2023-10-05 ASSESSMENT — PAIN SCALES - GENERAL: PAINLEVEL: NO PAIN (0)

## 2023-10-05 NOTE — PATIENT INSTRUCTIONS
Ortho Evra Patch Administration information per UpToDate:    Topical: New patches should be applied on the same day each week. Apply to clean, dry, intact, healthy skin on the buttock, abdomen, upper outer arm, or back. Avoid areas that will be rubbed by tight clothing. Do not apply to the breasts or to skin that is red, irritated, or cut. Alternate application sites; do not apply to the same place as the previous patch. Do not apply make-up, creams, lotions, powders, or other topical products to the skin where the patch will be placed. Open pouch at top edge and one side edge to remove patch. Remove the patch from the foil pouch, being careful not to remove the clear plastic cover when removing the patch. Discard additional pieces of film above and below the patch. Apply patch by first peeling back half of the clear plastic. Avoid touching the sticky surface of the patch. Apply patch to skin and remove the rest of the liner. Press patch down firmly onto skin using palm of the hand; apply pressure for 10 seconds. Run fingers over entire surface area to smooth out any wrinkles in the patch. The patch should be checked daily to ensure all edges are sticking. When changing the patch each week, the new patch may be applied in the same anatomic area but should be applied to a new spot in that area. Do not use supplemental adhesives or wraps to hold patch into place. Do not cut, damage or alter the size of the patch; contraceptive efficacy may be impaired.    If a patch becomes partially or completely detached for <24 hours: Try to reapply to same place or replace with a new patch immediately. Do not reapply if patch is no longer sticky, if it is sticking to itself or another surface, or if it has material sticking to it.  If a patch becomes partially or completely detached for >24 hours (or time period is unknown): Apply a new patch and use this day of the week as the new  patch change day  from this point on. An  additional method of contraception (nonhormonal) must be used for the first 7 days of consecutive administration.    Forgetting to apply the patch at the start of cycle (week 1/day 1): Apply first patch as soon as remembering, using this day of the week as the new  patch change day  from this point on. An additional method of contraception (nonhormonal) must be used for the first 7 days of consecutive administration.    Forgetting to change patch in the middle of the cycle (week 2/day 8 or week 3/day 15): If <48 hours from normal  patch change day,  apply new patch immediately. No back-up contraception is needed. If >48 hours from normal  patch change day,  apply a new patch and use this day of the week as the new  patch change day  from this point on. An additional method of contraception (nonhormonal) must be used for the first 7 days of consecutive administration.  Forgetting to remove patch at end of cycle (week 4/day 22): Take off as soon as remembering, start new cycle on usual  patch change day.     Changing the  patch change day : The  patch change day  can be changed to an earlier day in the week by first completing the current cycle. Then, during the  patch-free interval , select an earlier day to start the new cycle. Shortening the patch free interval may increase the incidence of spotting or breakthrough bleeding. Do not allow >7 consecutive patch-free days.    Skin irritation: If patch is in an uncomfortable location, it can be removed and a new patch applied to a different location until the next  patch change day.     To dispose of patch, fold the sticky sides together and dispose in the trash within a child-resistant container. Do not flush down the toilet.

## 2023-10-05 NOTE — PROGRESS NOTES
Assessment/plan   Earnestine Guillen is a 42 year old female who is established to my practice, here for a 6 week post partum visit.      Routine postpartum follow up  - Mood is good, declines need for additional resources. Mindfulness and exercise reviewed.  - Pap is indicated  - Hb  will be rechecked today along with platelets and CMP, A1c for other chronic medical condition follow up  - Continue prenatal vitamin  - Continue increasing iron containing foods in diet  - Contraception:  Ortho-Evra, eventually tubal ligation; referral for that placed    Pregestational Prediabetes in pregnancy #4  Managed by diet alone during the pregnancy. A1c 5.7% pre pregnancy, up tot 6.1% in August. She understands she is at high risk for developing diabetes down the road we will continue to watch this carefully.  Routine healthy exercise and dietary habits reviewed.  -     Hemoglobin A1c; Future    Hepatitis, autoimmune (H)  Has been followed by GI; will recheck labs today; if not improving will need to follow back up  -     CMP and CBC with platelets today    Hypertension affecting pregnancy in third trimester  Severe pre-eclampsia in third trimester  Well-controlled at this time and was able to wean down and now off her nifedipine for the past 2 days.  Blood pressure 130/85 in clinic today and has been lower than this at home.  She will continue to periodically monitor.  She understands she is at high risk for developing high blood pressure down the road we will continue to watch this carefully.  Routine healthy exercise and dietary habits reviewed.    ASCUS with positive high risk HPV cervical  Not 16/18 on HPV cotesting; due this year for recheck. She is on plaquenil with immunocompromising conditions    -     Pap screen with HPV - recommended age 30 - 65 years    Need for PCV and hepatitis B vaccination  Will defer today as getting COVID/flu shots     Tubal ligation evaluation  -     Ob/Gyn Referral; Future    Birth control  counseling  Reviewed range of contraceptive options available. Reviewed typical effectiveness of each as well as side effect profiles of these options, including effects on spotting, nausea/ headaches, and emotional lability as well as risk for DVT/PE.   - Pt elects for oral progesterone-only contraceptive.   - Good candidate for this, with no h/o migraine w/ aura, liver or clotting or kidney issues. Nonsmoker.   -     norelgestromin-ethinyl estradiol (ORTHO EVRA) 150-35 MCG/24HR patch; Remove old patch and apply new patch onto the skin once a week for 3 weeks (21 days). Do not wear patch week 4 (days 22-28), then repeat.    Screening for cervical cancer  -     Pap screen with HPV - recommended age 30 - 65 years    Urinary, incontinence, stress female  Options regarding pelvic floor therapy and she is open to this  -     Physical Therapy Referral; Future      Follow up: for contraception management, mood check or annual exam as needed    Rebecca Mckenna MD    Subjective:      HPI: Earnestine Guillen is a 42 year old female who is here for post partum visit.    Chief Complaint   Patient presents with    Postpartum Care     6 weeks postpartum visit, due for pap and HPV testing         Post partum check: I have fully reviewed the prenatal and intrapartum course.  38w0d at time of delivery.  Outcome: spontaneous vaginal delivery  Delivery course & outcome: She was admitted on 8/23/23 in early labor however was found to have asymptomatic but severe range blood pressures with subsequent diagnosis for preeclampsia with severe features.  She was treated with IV labetalol x3 doses during the admission as well as IV magnesium for that indication for seizure prophylaxis.  She was discharged on 60 mg of nifedipine extended release as her postpartum blood pressures returned to severe range but remained asymptomatic.   These normalized over the next 24 hours and so she was discharged to home       She has now gone 2 days without  medication (she weaned off the nifedipine 60mg to 30mg for about 1 week, then stopped it 2 days ago per my instructions verbally).   BP today 130/85.     Her prenatal course is well-known to me and I was delivering physician.     Her platelet count is somewhat improved from admission with andrzej at 97,000 and discharged at 124,000.  This was a change about 2 days prior to her admission with new onset low platelet count.  Hemoglobin at discharge was 10.1.  AST and ALT were normal.  She did not meet criteria for help syndrome.      Postpartum course has been uncomplicated.  Baby's course has been uncomplicated.  She is breastfeeding and now formula feeding      Patient has not resumed intercourse.   Contraception methods discussed per plan above.  She is undecided on birth control at this time. So far not leaning toward having more children. She may be interested in tubal ligation but declines referral at this time; considering vasectomy for her spouse as well.       Bleeding pattern: normal for postpartum course; no longer bleeding  Bowel function: normal, colace reviewed as needed  Bladder function: she does experience urinary leakage with cough/sneeze; worse since this pregnancy     Postpartum depression screening score: low, recently done at [facility] and defer additional today     Other medical conditions that need follow up: as above    Review of Systems:  12 point comprehensive review of systems was negative except as noted and HPI     Social History:  Social History     Social History Narrative    , 3 children    Non smoker    Rebecca Mckenna MD       Medical History:  The following portions of the patient's history were reviewed and updated as appropriate: allergies, current medications, and problem list    Medications:  Current Outpatient Medications   Medication Sig Dispense Refill    Acetone, Urine, Test (KETONE TEST) STRP 1 each by In Vitro route daily 50 strip 3    aspirin (ASA) 81 MG EC  tablet Take 1 tablet (81 mg) by mouth daily 90 tablet 1    azaTHIOprine (IMURAN) 50 MG tablet Take 50 mg by mouth daily      blood glucose (NO BRAND SPECIFIED) lancets standard Use to test blood sugar 4 times daily or as directed. 400 each 1    blood glucose (NO BRAND SPECIFIED) test strip Use to test blood sugar 4 times daily or as directed. 400 strip 1    blood glucose monitoring (ONETOUCH VERIO) meter device kit Use to test blood sugar 4-6 times daily or as directed. 1 kit 0    docusate sodium (COLACE) 100 MG capsule Take 1 capsule (100 mg) by mouth daily 30 capsule 0    hydroxychloroquine (PLAQUENIL) 200 MG tablet TAKE 1 & 1/2 TABLETS (300 MG) BY MOUTH ONCE DAILY.      ibuprofen (ADVIL/MOTRIN) 800 MG tablet TAKE 1 TABLET (800 MG) BY MOUTH ONCE AS NEEDED FOR INFLAMMATORY PAIN (FOR MILD TO MODERATE PAIN.) 30 tablet 0    norelgestromin-ethinyl estradiol (ORTHO EVRA) 150-35 MCG/24HR patch Remove old patch and apply new patch onto the skin once a week for 3 weeks (21 days). Do not wear patch week 4 (days 22-28), then repeat. 3 patch 3    Prenatal Vit-Fe Fumarate-FA (PRENATAL MULTIVITAMIN W/IRON) 27-0.8 MG tablet Take 1 tablet by mouth daily      spacer (OPTICHAMBER SHELBY) holding chamber FOR HOME USE.      SUMAtriptan (IMITREX) 100 MG tablet Take 1 tablet (100 mg) by mouth at onset of headache for migraine 10 tablet 11       Imaging & Labs reviewed this visit:   Lab Results   Component Value Date    WBC 6.0 08/23/2023    HGB 10.1 (L) 08/27/2023    HCT 36.9 08/23/2023    MCV 81 08/23/2023     (L) 08/27/2023     Lab Results   Component Value Date    TSH 2.31 01/03/2023     No results found for: HGBA1C    Objective:      Vitals:    10/05/23 0922   BP: 130/85   Pulse: 56   Temp: 97.9  F (36.6  C)   TempSrc: Oral   SpO2: 96%   Weight: 61.1 kg (134 lb 11.2 oz)   Height: 1.524 m (5')   PainSc: No Pain (0)       Physical Exam:     General: Alert, no acute distress.   HEET: normocephalic conjunctivae are clear, Nose  is clear.  Oropharynx is moist and clear, without tonsillar hypertrophy, asymmetry, exudate or lesions.   Neck: supple without adenopathy or thyromegaly.  Back: Symmetric, no curvature, ROM normal, no CVA tenderness  Breasts: pt declined exam  Lungs: Good aeration bilaterally. Clear to auscultation without wheezes, rales or rhonci.   Heart: regular rate and rhythm, normal S1 and S2, no murmurs  Abdomen: soft and nontender, no masses, no organomegaly  Skin: clear without rash or lesions  Neuro: alert, interactive moving all extremities equally, normal muscle tone in all 4 extremities, deep tendon reflexes 2+ symmetrically at the patella  Pelvic: Normal vulva. Vagina and cervix healing; no lesions, inflammation, discharge or tenderness. No cystocele, No rectocele. Uterus contracted.  No adnexal mass or tenderness.   Extremities: Extremities normal, atraumatic, no cyanosis or edema  Lymph nodes: Cervical &  supraclavicular nodes normal

## 2023-10-08 ENCOUNTER — HEALTH MAINTENANCE LETTER (OUTPATIENT)
Age: 43
End: 2023-10-08

## 2023-10-09 DIAGNOSIS — G43.109 MIGRAINE WITH AURA AND WITHOUT STATUS MIGRAINOSUS, NOT INTRACTABLE: ICD-10-CM

## 2023-10-09 RX ORDER — SUMATRIPTAN 100 MG/1
100 TABLET, FILM COATED ORAL
Qty: 10 TABLET | Refills: 3 | Status: SHIPPED | OUTPATIENT
Start: 2023-10-09

## 2023-10-12 LAB
BKR LAB AP GYN ADEQUACY: ABNORMAL
BKR LAB AP GYN INTERPRETATION: ABNORMAL
BKR LAB AP HPV REFLEX: ABNORMAL
BKR LAB AP PREVIOUS ABNL DX: ABNORMAL
BKR LAB AP PREVIOUS ABNORMAL: ABNORMAL
PATH REPORT.COMMENTS IMP SPEC: ABNORMAL
PATH REPORT.COMMENTS IMP SPEC: ABNORMAL
PATH REPORT.RELEVANT HX SPEC: ABNORMAL

## 2023-10-13 NOTE — PATIENT INSTRUCTIONS
Go to [facility] now for evaluation of painful red eye  [place] location    [facility]   [address] [address]   Phone: [phone]     
Back strain

## 2023-10-16 ENCOUNTER — PATIENT OUTREACH (OUTPATIENT)
Dept: FAMILY MEDICINE | Facility: CLINIC | Age: 43
End: 2023-10-16
Payer: COMMERCIAL

## 2023-12-26 ENCOUNTER — MEDICAL CORRESPONDENCE (OUTPATIENT)
Dept: HEALTH INFORMATION MANAGEMENT | Facility: CLINIC | Age: 43
End: 2023-12-26
Payer: COMMERCIAL

## 2024-01-03 RX ORDER — DOCUSATE SODIUM 100 MG/1
100 CAPSULE, LIQUID FILLED ORAL DAILY
Qty: 90 CAPSULE | Refills: 0 | Status: SHIPPED | OUTPATIENT
Start: 2024-01-03 | End: 2024-01-22

## 2024-01-18 ENCOUNTER — MEDICAL CORRESPONDENCE (OUTPATIENT)
Dept: HEALTH INFORMATION MANAGEMENT | Facility: CLINIC | Age: 44
End: 2024-01-18

## 2024-01-22 ENCOUNTER — OFFICE VISIT (OUTPATIENT)
Dept: FAMILY MEDICINE | Facility: CLINIC | Age: 44
End: 2024-01-22
Payer: COMMERCIAL

## 2024-01-22 VITALS
TEMPERATURE: 97.6 F | SYSTOLIC BLOOD PRESSURE: 126 MMHG | OXYGEN SATURATION: 99 % | WEIGHT: 140.7 LBS | DIASTOLIC BLOOD PRESSURE: 85 MMHG | HEIGHT: 60 IN | HEART RATE: 75 BPM | BODY MASS INDEX: 27.62 KG/M2 | RESPIRATION RATE: 16 BRPM

## 2024-01-22 DIAGNOSIS — Z87.42 HISTORY OF ABNORMAL CERVICAL PAP SMEAR: Primary | ICD-10-CM

## 2024-01-22 PROCEDURE — 57454 BX/CURETT OF CERVIX W/SCOPE: CPT | Performed by: FAMILY MEDICINE

## 2024-01-22 PROCEDURE — 88305 TISSUE EXAM BY PATHOLOGIST: CPT | Mod: 59 | Performed by: PATHOLOGY

## 2024-01-22 NOTE — PROGRESS NOTES
Earnestine Guillen is a 43 year old female who presents for initial colposcopy, referred by . Pap smear 3 months ago showed: LSIL. The prior pap showed ASC-US with HR HPV positive except for 16 and 18.     LMP 01/13/2024    Past Medical History:   Diagnosis Date    Acute pyelonephritis     Anemia     Autoimmune hepatitis (H) 01/01/2018    Diabetes mellitus (H)     DGDM    Migraine     PID (acute pelvic inflammatory disease) 01/01/2018    Sjogren's syndrome (H24)     SS-A antibody positive     SS-B antibody positive      No family history on file.    Previous history of abnormal paps?: Yes   History of cryotherapy (freezing)?: : No  History of veneral diseases: : No  Do you desire testing for any of these diseases? : No  History of genital warts:  No  Visible warts now?:  No  Previously treated? If so, how?:  No     Patient's last menstrual period was 01/13/2024.  Type of contraception: Evra Patch  Age at first sexual intercourse: 17  Number of sexual partners (lifetime): 2  History   Smoking Status    Never   Smokeless Tobacco    Never     History of sexual abuse:  No  Allergies as of 01/22/2024 - Reviewed 01/22/2024   Allergen Reaction Noted    Methocarbamol Rash 11/30/2018    Venlafaxine Rash 04/01/2019        PROCEDURE:  Before the procedure, it was ensured that the patient was educated regarding the nature of her findings to date, the implications of them, and what was to be done. She has been made aware of the role of HPV, the natural history of infection, ways to minimize her future risk, the effect of HPV on the cervix, and treatment options available should they be indicated. The   pathophysiology of the cervix, including a discussion of squamous vs. endometrial cells, and squamous metaplasia have all been reviewed, using illustrations and sketches. The details of the colposcopic procedure were reviewed, as well as the risks of missed diagnoses, pain, infection and bleeding. All questions were answered before  proceeding, and informed consent was therefore obtained.    Bimanual examination: was not done  Unenhanced examination of the cervix was normal without lesions.  Pap smear and endocervical sampling not obtained due to:    not due  Please refer to images section for details!  Pap repeated?:  No  SCJ seen?:  yes  Endocervical speculum needed?:  No  ECC done?:  Yes   Lugol's solution used?:  No  Satisfactory examination?:  yes    Vaginal vault: normal to cursory inspection normal  Urethra normal?:  yes  Labia normal?:  yes  Perineum normal?:  yes  Rectum normal?:  yes    FINDINGS:  Please see image   Cervix: acetowhitening noted at the superior and inferior aspect of the cervix  Procedure: biopsies taken (not including ECC): 2.    Procedure summary: Patient tolerated procedure well     Assessment: HPV related changes  Bruce index:     Plan: Specimens labelled and sent to pathology. and Will base further treatment on pathology findings.

## 2024-01-29 ENCOUNTER — TELEPHONE (OUTPATIENT)
Dept: FAMILY MEDICINE | Facility: CLINIC | Age: 44
End: 2024-01-29
Payer: COMMERCIAL

## 2024-01-29 DIAGNOSIS — Z30.9 ENCOUNTER FOR BIRTH CONTROL: Primary | ICD-10-CM

## 2024-01-29 DIAGNOSIS — Z30.09 FAMILY PLANNING: ICD-10-CM

## 2024-01-29 DIAGNOSIS — Z01.818 TUBAL LIGATION EVALUATION: ICD-10-CM

## 2024-01-29 LAB
PATH REPORT.COMMENTS IMP SPEC: NORMAL
PATH REPORT.FINAL DX SPEC: NORMAL
PATH REPORT.FINAL DX SPEC: NORMAL
PATH REPORT.GROSS SPEC: NORMAL
PATH REPORT.GROSS SPEC: NORMAL
PATH REPORT.MICROSCOPIC SPEC OTHER STN: NORMAL
PATH REPORT.MICROSCOPIC SPEC OTHER STN: NORMAL
PATH REPORT.RELEVANT HX SPEC: NORMAL
PATH REPORT.RELEVANT HX SPEC: NORMAL
PHOTO IMAGE: NORMAL
PHOTO IMAGE: NORMAL

## 2024-01-29 RX ORDER — NORELGESTROMIN AND ETHINYL ESTRADIOL 35; 150 UG/MG; UG/MG
PATCH TRANSDERMAL
Qty: 12 PATCH | Refills: 3 | Status: SHIPPED | OUTPATIENT
Start: 2024-01-29

## 2024-01-29 NOTE — TELEPHONE ENCOUNTER
Ob/GYN Tubal referral    Ortho Mela 90 day supply -sent    [facility]

## 2024-02-01 DIAGNOSIS — N87.0 CIN I (CERVICAL INTRAEPITHELIAL NEOPLASIA I): Primary | ICD-10-CM

## 2024-02-15 ENCOUNTER — PATIENT OUTREACH (OUTPATIENT)
Dept: FAMILY MEDICINE | Facility: CLINIC | Age: 44
End: 2024-02-15
Payer: COMMERCIAL

## 2024-02-15 PROBLEM — R87.810 ASCUS WITH POSITIVE HIGH RISK HPV CERVICAL: Status: ACTIVE | Noted: 2017-07-21

## 2024-02-15 PROBLEM — R87.610 ASCUS WITH POSITIVE HIGH RISK HPV CERVICAL: Status: ACTIVE | Noted: 2017-07-21

## 2024-02-25 ENCOUNTER — HEALTH MAINTENANCE LETTER (OUTPATIENT)
Age: 44
End: 2024-02-25

## 2024-09-03 ENCOUNTER — OFFICE VISIT (OUTPATIENT)
Dept: FAMILY MEDICINE | Facility: CLINIC | Age: 44
End: 2024-09-03
Payer: COMMERCIAL

## 2024-09-03 VITALS
DIASTOLIC BLOOD PRESSURE: 85 MMHG | TEMPERATURE: 98.5 F | WEIGHT: 135 LBS | BODY MASS INDEX: 26.37 KG/M2 | HEART RATE: 72 BPM | OXYGEN SATURATION: 96 % | SYSTOLIC BLOOD PRESSURE: 135 MMHG | RESPIRATION RATE: 17 BRPM

## 2024-09-03 DIAGNOSIS — B34.9 VIRAL SYNDROME: Primary | ICD-10-CM

## 2024-09-03 DIAGNOSIS — Z11.52 ENCOUNTER FOR SCREENING FOR COVID-19: ICD-10-CM

## 2024-09-03 LAB
DEPRECATED S PYO AG THROAT QL EIA: NEGATIVE
FLUAV AG SPEC QL IA: NEGATIVE
FLUBV AG SPEC QL IA: NEGATIVE

## 2024-09-03 PROCEDURE — 99214 OFFICE O/P EST MOD 30 MIN: CPT | Performed by: PHYSICIAN ASSISTANT

## 2024-09-03 PROCEDURE — 87635 SARS-COV-2 COVID-19 AMP PRB: CPT | Performed by: PHYSICIAN ASSISTANT

## 2024-09-03 PROCEDURE — 87804 INFLUENZA ASSAY W/OPTIC: CPT | Performed by: PHYSICIAN ASSISTANT

## 2024-09-03 PROCEDURE — 87651 STREP A DNA AMP PROBE: CPT | Performed by: PHYSICIAN ASSISTANT

## 2024-09-03 RX ORDER — ALBUTEROL SULFATE 90 UG/1
2 AEROSOL, METERED RESPIRATORY (INHALATION) EVERY 6 HOURS
Qty: 18 G | Refills: 0 | Status: SHIPPED | OUTPATIENT
Start: 2024-09-03 | End: 2024-10-03

## 2024-09-03 RX ORDER — BENZONATATE 100 MG/1
100 CAPSULE ORAL 3 TIMES DAILY PRN
Qty: 30 CAPSULE | Refills: 0 | Status: SHIPPED | OUTPATIENT
Start: 2024-09-03 | End: 2024-09-13

## 2024-09-03 NOTE — LETTER
September 3, 2024      Earnestine Guillen  [address] [facility] [address]        To Whom It May Concern:    Earnestine AMIN Wilmer  was seen on 09/03/24.  Please excuse her until 9/3/24  due to illness.        Sincerely,        Jeb Palacios PA-C

## 2024-09-04 LAB
GROUP A STREP BY PCR: NOT DETECTED
SARS-COV-2 RNA RESP QL NAA+PROBE: NEGATIVE

## 2024-09-04 NOTE — PROGRESS NOTES
Patient presents with:  Fever  Nasal Congestion  Cough  Sick: 2 days       Clinical Decision Making:  Strep test was obtained and was negative.  Culture is to follow.  Influenza testing is negative. COVID-19 screening test is pending.  Symptomatic care was gone over. Expected course of resolution and indication for return was gone over and questions were answered to patient/parent's satisfaction before discharge. Work note was written for appointment.        ICD-10-CM    1. Viral syndrome  B34.9 Streptococcus A Rapid Screen w/Reflex to PCR     Influenza A & B Antigen     Group A Streptococcus PCR Throat Swab     albuterol (PROAIR HFA/PROVENTIL HFA/VENTOLIN HFA) 108 (90 Base) MCG/ACT inhaler     benzonatate (TESSALON) 100 MG capsule      2. Encounter for screening for COVID-19  Z11.52 Symptomatic COVID-19 Virus (Coronavirus) by PCR Nose          Patient Instructions   Suggested increased rest increased fluids and bedside humidification  Over-the-counter Tylenol for comfort.  Follow packaging directions  Over-the-counter throat lozenges with benzocaine, such as Cepacol, may be used if indicated and is not a choking hazard based on age.    Follow packaging directions for throat lozenges.    Do not overuse the benzocaine as it will dry the throat and make it uncomfortable.  Use one lozenge per hour as directed on package and may use hard candies lemon drops etc. keep the saliva flowing until the next dosing interval after 1 hour.    Follow up with primary care provider if you do not get resolution with the course of treatment.  Return to walk-in care if complication or new symptoms arise in the interim.      How can I protect others? Discharge Instructions for COVID-19 precautions:  If you have symptoms (fever, cough, body aches or trouble breathing):  Stay home and away from others (self-isolate) until:  At least 10 days have passed since your symptoms started. And   You've had no fever--and no medicine that reduces  fever--for 1 full day (24 hours). And   Your other symptoms have resolved (gotten better) OR you have a negative covid test.     Call [phone] for treatment if the COVID 19 test is positive.       HPI:  Earnestine Guillen is a 43 year old female who presents today for a two  day acute onset of sore throat and odynophagia headache myalgias arthralgias dry nonproductive cough nasal congestion and fever with a temperature max of 101 degrees taken at home..  Patient denies fever, chills, night sweats, fatigue, vomiting, diarrhea, skin rash, abdominal pain or urinary symptoms.      No known sick contacts for strep throat.    Has used over-the-counter Advil 3 hours ago.    History obtained from chart review and the patient.    Problem List:  2023: Preeclampsia, severe  2023: Hypertension complicating pregnancy  2023: Encounter for triage in pregnant patient  2021: Abnormal LFTs s/t autoimmune hepatitis  2020:  (normal spontaneous vaginal delivery)  2020: Pregnant  2020:  premature rupture of membranes (PPROM) at 34w2d  2020: Multigravida of advanced maternal age   -12: Pregestational Prediabetes in pregnancy #4  2019-10: Placenta previa in second trimester  2019: SS-A and SS-B antibody positive  2019: Sjogren's syndrome (H)iwth ++ SSA/SSB  2019: Thrombocytopenia affecting pregnancy (H24)  2019: Need for hepatitis B vaccination  2019: Supervision of high-risk pregnancy of elderly multigravida  -: Hepatitis, autoimmune (H)  -: Positive EMELIA (antinuclear antibody)  2018: Migraine with aura and without status migrainosus, not   intractable  2017: ASCUS with positive high risk HPV cervical  2017: Cervical cancer screening  SIRS (systemic inflammatory response syndrome) (H)      Past Medical History:   Diagnosis Date    Acute pyelonephritis     Anemia     Autoimmune hepatitis (H) 2018    Diabetes mellitus (H)     DGDM    Migraine     PID (acute  pelvic inflammatory disease) 01/01/2018    Sjogren's syndrome (H24)     SS-A antibody positive     SS-B antibody positive        Social History     Tobacco Use    Smoking status: Never     Passive exposure: Never    Smokeless tobacco: Never   Substance Use Topics    Alcohol use: Not Currently       Review of Systems  As above in HPI otherwise negative.    Vitals:    09/03/24 1842   BP: 135/85   Pulse: 72   Resp: 17   Temp: 98.5  F (36.9  C)   SpO2: 96%   Weight: 61.2 kg (135 lb)       General: Patient is resting comfortably no acute distress is afebrile  HEENT: Head is normocephalic atraumatic   eyes are PERRL EOMI sclera anicteric   TMs are clear bilaterally  Throat is with pharyngeal wall erythema and no exudate  No cervical lymphadenopathy present  LUNGS: Clear to auscultation bilaterally  HEART: Regular rate and rhythm  Skin: Without rash non-diaphoretic    Physical Exam      Labs:  Results for orders placed or performed in visit on 09/03/24   Streptococcus A Rapid Screen w/Reflex to PCR     Status: Normal    Specimen: Throat; Swab   Result Value Ref Range    Group A Strep antigen Negative Negative   Influenza A & B Antigen     Status: Normal    Specimen: Nose; Swab   Result Value Ref Range    Influenza A antigen Negative Negative    Influenza B antigen Negative Negative    Narrative    Test results must be correlated with clinical data. If necessary, results should be confirmed by a molecular assay or viral culture.     Covid testing is pending.    At the end of the encounter, I discussed results, diagnosis, medications. Discussed red flags for immediate return to clinic/ER, as well as indications for follow up if no improvement. Patient understood and agreed to plan. Patient was stable for discharge.

## 2024-09-04 NOTE — PATIENT INSTRUCTIONS
Suggested increased rest increased fluids and bedside humidification  Over-the-counter Tylenol for comfort.  Follow packaging directions  Over-the-counter throat lozenges with benzocaine, such as Cepacol, may be used if indicated and is not a choking hazard based on age.    Follow packaging directions for throat lozenges.    Do not overuse the benzocaine as it will dry the throat and make it uncomfortable.  Use one lozenge per hour as directed on package and may use hard candies lemon drops etc. keep the saliva flowing until the next dosing interval after 1 hour.    Follow up with primary care provider if you do not get resolution with the course of treatment.  Return to walk-in care if complication or new symptoms arise in the interim.      How can I protect others? Discharge Instructions for COVID-19 precautions:  If you have symptoms (fever, cough, body aches or trouble breathing):  Stay home and away from others (self-isolate) until:  At least 10 days have passed since your symptoms started. And   You've had no fever--and no medicine that reduces fever--for 1 full day (24 hours). And   Your other symptoms have resolved (gotten better) OR you have a negative covid test.     Call [phone] for treatment if the COVID 19 test is positive.

## 2024-10-22 DIAGNOSIS — G43.109 MIGRAINE WITH AURA AND WITHOUT STATUS MIGRAINOSUS, NOT INTRACTABLE: ICD-10-CM

## 2024-10-24 RX ORDER — SUMATRIPTAN SUCCINATE 100 MG/1
TABLET ORAL
Qty: 9 TABLET | Refills: 4 | Status: SHIPPED | OUTPATIENT
Start: 2024-10-24

## 2025-01-03 PROBLEM — R87.810 ASCUS WITH POSITIVE HIGH RISK HPV CERVICAL: Status: ACTIVE | Noted: 2017-07-21

## 2025-01-03 PROBLEM — R87.610 ASCUS WITH POSITIVE HIGH RISK HPV CERVICAL: Status: ACTIVE | Noted: 2017-07-21

## 2025-01-12 DIAGNOSIS — Z30.9 ENCOUNTER FOR BIRTH CONTROL: ICD-10-CM

## 2025-01-13 RX ORDER — NORELGESTROMIN AND ETHINYL ESTRADIOL 35; 150 UG/MG; UG/MG
PATCH TRANSDERMAL
Qty: 12 PATCH | Refills: 3 | Status: SHIPPED | OUTPATIENT
Start: 2025-01-13

## 2025-01-23 ENCOUNTER — OFFICE VISIT (OUTPATIENT)
Dept: FAMILY MEDICINE | Facility: CLINIC | Age: 45
End: 2025-01-23
Payer: COMMERCIAL

## 2025-01-23 VITALS
SYSTOLIC BLOOD PRESSURE: 129 MMHG | HEART RATE: 80 BPM | RESPIRATION RATE: 16 BRPM | BODY MASS INDEX: 25.86 KG/M2 | TEMPERATURE: 97.5 F | HEIGHT: 60 IN | WEIGHT: 131.7 LBS | DIASTOLIC BLOOD PRESSURE: 79 MMHG | OXYGEN SATURATION: 99 %

## 2025-01-23 DIAGNOSIS — Z12.4 SCREENING FOR CERVICAL CANCER: ICD-10-CM

## 2025-01-23 DIAGNOSIS — O24.410 DIET CONTROLLED GESTATIONAL DIABETES MELLITUS (GDM) IN THIRD TRIMESTER: ICD-10-CM

## 2025-01-23 DIAGNOSIS — Z00.00 ROUTINE GENERAL MEDICAL EXAMINATION AT A HEALTH CARE FACILITY: Primary | ICD-10-CM

## 2025-01-23 DIAGNOSIS — R79.89 ABNORMAL LFTS: ICD-10-CM

## 2025-01-23 DIAGNOSIS — M35.00 SJOGREN'S SYNDROME, WITH UNSPECIFIED ORGAN INVOLVEMENT: ICD-10-CM

## 2025-01-23 DIAGNOSIS — J45.20 MILD INTERMITTENT ASTHMA WITHOUT COMPLICATION: ICD-10-CM

## 2025-01-23 DIAGNOSIS — Z30.09 BIRTH CONTROL COUNSELING: ICD-10-CM

## 2025-01-23 DIAGNOSIS — Z23 NEED FOR HEPATITIS B VACCINATION: ICD-10-CM

## 2025-01-23 DIAGNOSIS — B34.9 VIRAL SYNDROME: ICD-10-CM

## 2025-01-23 DIAGNOSIS — R87.7 LOW GRADE SQUAMOUS INTRAEPITHELIAL LESION (LGSIL) ON BIOPSY OF CERVIX: ICD-10-CM

## 2025-01-23 DIAGNOSIS — Z12.31 VISIT FOR SCREENING MAMMOGRAM: ICD-10-CM

## 2025-01-23 PROBLEM — K75.4 HEPATITIS, AUTOIMMUNE (H): Chronic | Status: RESOLVED | Noted: 2018-09-06 | Resolved: 2025-01-23

## 2025-01-23 PROBLEM — O16.9 HYPERTENSION COMPLICATING PREGNANCY: Status: RESOLVED | Noted: 2023-08-24 | Resolved: 2025-01-23

## 2025-01-23 PROBLEM — Z87.59 HISTORY OF PRE-ECLAMPSIA: Status: ACTIVE | Noted: 2023-08-26

## 2025-01-23 LAB
ERYTHROCYTE [DISTWIDTH] IN BLOOD BY AUTOMATED COUNT: 12.3 % (ref 10–15)
EST. AVERAGE GLUCOSE BLD GHB EST-MCNC: 123 MG/DL
HBA1C MFR BLD: 5.9 % (ref 0–5.6)
HCT VFR BLD AUTO: 36.4 % (ref 35–47)
HGB BLD-MCNC: 11.9 G/DL (ref 11.7–15.7)
MCH RBC QN AUTO: 25.5 PG (ref 26.5–33)
MCHC RBC AUTO-ENTMCNC: 32.7 G/DL (ref 31.5–36.5)
MCV RBC AUTO: 78 FL (ref 78–100)
PLATELET # BLD AUTO: 181 10E3/UL (ref 150–450)
RBC # BLD AUTO: 4.67 10E6/UL (ref 3.8–5.2)
WBC # BLD AUTO: 4.2 10E3/UL (ref 4–11)

## 2025-01-23 RX ORDER — NORELGESTROMIN AND ETHINYL ESTRADIOL 35; 150 UG/MG; UG/MG
PATCH TRANSDERMAL
Qty: 3 PATCH | Refills: 3 | Status: SHIPPED | OUTPATIENT
Start: 2025-01-23

## 2025-01-23 RX ORDER — BUDESONIDE AND FORMOTEROL FUMARATE DIHYDRATE 80; 4.5 UG/1; UG/1
2 AEROSOL RESPIRATORY (INHALATION) 2 TIMES DAILY
Qty: 10.2 G | Refills: 2 | Status: SHIPPED | OUTPATIENT
Start: 2025-01-23

## 2025-01-23 RX ORDER — ALBUTEROL SULFATE 90 UG/1
2 INHALANT RESPIRATORY (INHALATION) EVERY 6 HOURS
Qty: 18 G | Refills: 0 | Status: SHIPPED | OUTPATIENT
Start: 2025-01-23

## 2025-01-23 SDOH — HEALTH STABILITY: PHYSICAL HEALTH: ON AVERAGE, HOW MANY DAYS PER WEEK DO YOU ENGAGE IN MODERATE TO STRENUOUS EXERCISE (LIKE A BRISK WALK)?: 2 DAYS

## 2025-01-23 ASSESSMENT — SOCIAL DETERMINANTS OF HEALTH (SDOH): HOW OFTEN DO YOU GET TOGETHER WITH FRIENDS OR RELATIVES?: ONCE A WEEK

## 2025-01-23 NOTE — PATIENT INSTRUCTIONS
[facility] number:  [phone], ext #[phone] for scheduling  [email or web address]    They have a few locations, closest here is at the [facility].Please confirm appointment location at time of scheduling.    [address], [address]  [place] [address]  (Enter through the main doors of hospital entrance)

## 2025-01-23 NOTE — PROGRESS NOTES
Preventive Care Visit  [facility]  Rebecca Mckenna MD, Family Medicine  Jan 23, 2025      Assessment & Plan     Routine general medical examination at a health care facility  She is open to all 4 vaccines that she is due for today.  Of note she was just tested positive to influenza A on 1/13 and it is now been a couple weeks since that time and she would like to proceed with the influenza vaccination to prevent from influenza B and other strains.      - HEPATITIS B, ADULT 20+ (ENGERIX-B/RECOMBIVAX HB)  - REVIEW OF HEALTH MAINTENANCE PROTOCOL ORDERS  - INFLUENZA VACCINE,SPLIT VIRUS,TRIVALENT,PF(FLUZONE)  - COVID-19 12+ (PFIZER)  - MA Screening Bilateral w/ Keith; Future  - Pneumococcal 20 Valent Conjugate (Prevnar 20)  - HPV and Gynecologic Cytology Panel - Recommended Age 30-65 Years  - PRIMARY CARE FOLLOW-UP SCHEDULING; Future  - Comprehensive metabolic panel; Future  - Hemoglobin A1c; Future  - CBC with platelets; Future  - Lipid Profile; Future    Need for hepatitis B vaccination  - HEPATITIS B, ADULT 20+ (ENGERIX-B/RECOMBIVAX HB)    Sjogren's syndrome, with unspecified organ involvement  Hx autoimmune hepatitis     Followed by rheumatology on azithromycin and Plaquenil.  Symptoms have remained stable. Previously followed by GI during her pregnancy with hx of hepatitis(autoimmune); CHMP and CBC today       Mild intermittent asthma without complication  Patient describes what sounds like virally triggered bronchospasm and in the past has been offered albuterol which she finds moderately effective.  Her son does have significant asthma as does her .  She is quite familiar with this and she does suspect this might be playing a role during her viral URIs that make symptoms little worse.  Of note she was recently sick with influenza and she feels like the cough and wheezing has been persistent with tight breathing.  Her oxygen saturation is 99% today and she did sound a little  wheezy.  I recommended we add Symbicort to her regimen at least during URI flares and possibly during the winter season in general.  - budesonide-formoterol (SYMBICORT) 80-4.5 MCG/ACT Inhaler; Inhale 2 puffs into the lungs 2 times daily.    Pregestational Prediabetes in pregnancy #4  Screening A1c today as above    Screening for cervical cancer  Low grade squamous intraepithelial lesion (LGSIL) on biopsy of cervix  I anticipate she will likely need another colposcopy so we will schedule this.  She is on immunosuppressant medication.  - HPV and Gynecologic Cytology Panel - Recommended Age 30-65 Years    Birth control counseling  She desires eventual tubal ligation, unclear however it sounds like they did not reach out to her last year and so I gave her the number to directly contact the provider again.  - norelgestromin-ethinyl estradiol (ORTHO EVRA) 150-35 MCG/24HR patch; Remove old patch and apply new patch onto the skin once a week for 3 weeks (21 days). Do not wear patch week 4 (days 22-28), then repeat.  - Ob/Gyn  Referral; Future    Visit for screening mammogram  - MA Screening Bilateral w/ Keith; Future    Patient has been advised of split billing requirements and indicates understanding: Yes      Follow-up 2 months for colposcopy if needed pending results      BMI  Estimated body mass index is 25.72 kg/m  as calculated from the following:    Height as of this encounter: 1.524 m (5').    Weight as of this encounter: 59.7 kg (131 lb 11.2 oz).   Weight management plan: Discussed healthy diet and exercise guidelines    Counseling  Appropriate preventive services were addressed with this patient via screening, questionnaire, or discussion as appropriate for fall prevention, nutrition, physical activity, Tobacco-use cessation, social engagement, weight loss and cognition.  Checklist reviewing preventive services available has been given to the patient.  Reviewed patient's diet, addressing concerns and/or  questions.   She is at risk for lack of exercise and has been provided with information to increase physical activity for the benefit of her well-being.           The longitudinal plan of care for the diagnosis(es)/condition(s) as documented were addressed during this visit. Due to the added complexity in care, I will continue to support Earnestine in the subsequent management and with ongoing continuity of care.    Meme Colby is a 44 year old, presenting for the following:  Physical        1/23/2025     1:14 PM   Additional Questions   Roomed by Renetta PRUITT MA   Accompanied by Son          HPI        States she never got a phone call last year to schedule the tubal ligation.   Continues Ortho Evra patch     HTN hx: has had normal BP ever since her tooth was pulled; hasn't needed meds since post partum with her last pregnancy    Rheumatology follows her for the Sjogren's- on Plaquinil and Azathioprine  Yearly eye appt at this point      She was sick with influenza last week; used albuterol for this viral URI to help with wheezing/cough/SOB and winter     Still coughing   No fevers now    Strong family hx of asthma; she suspects she has this as well. Suspected to be viral URI triggered.     Pap is due:       Pt takes Plaquenil  07/17/17  NILM, neg HPV   07/23/18 ASCUS, Positive for High Risk HPV types other than 16 or 18     08/20/18 [place] visually normal Plan PAP/HPV in 1 year. Crystal Wright MD  04/13/21 NIL Pap, Neg HR HPV   All above in care everywhere   08/1/22 ASCUS Pap, + HR HPV (not 16 or 18) Plan cotest in 1 year due 08/1/23.  Results and recommendations released to the pt through [id number], pt viewed.  07/13/23 pt is now pregnant with a EDC of 09/08/23 message sent to the provider to see if a cotest should still be done now or postpartum.  New plan cotest 6 weeks pp per provider EDC of 09/08/23   10/5/23 LSIL Pap, +HR HPV (not 16 or 18) Plan [place] due bef 01/5/24 1/22/24 COLP- Focal LSIL. ECC- negative for  dysplasia. Plan cotest in 1 year due by 1/22/25 01/23/25 annual--notes added         Health Care Directive  Patient does not have a Health Care Directive: Discussed advance care planning with patient; information given to patient to review.      1/23/2025   General Health   How would you rate your overall physical health? Good   Feel stress (tense, anxious, or unable to sleep) Not at all         1/23/2025   Nutrition   Three or more servings of calcium each day? (!) I DON'T KNOW   Diet: Regular (no restrictions)   How many servings of fruit and vegetables per day? (!) 2-3   How many sweetened beverages each day? 0-1         1/23/2025   Exercise   Days per week of moderate/strenous exercise 2 days   (!) EXERCISE CONCERN      1/23/2025   Social Factors   Frequency of gathering with friends or relatives Once a week   Worry food won't last until get money to buy more No   Food not last or not have enough money for food? No   Do you have housing? (Housing is defined as stable permanent housing and does not include staying ouside in a car, in a tent, in an abandoned building, in an overnight shelter, or couch-surfing.) Yes   Are you worried about losing your housing? No   Lack of transportation? No   Unable to get utilities (heat,electricity)? No         1/23/2025   Dental   Dentist two times every year? Yes         1/23/2025   TB Screening   Were you born outside of the [place]? Yes         Today's PHQ-2 Score:       1/23/2025     1:03 PM   PHQ-2 ( 1999 [employer])   Q1: Little interest or pleasure in doing things 0   Q2: Feeling down, depressed or hopeless 0   PHQ-2 Score 0    Q1: Little interest or pleasure in doing things Not at all   Q2: Feeling down, depressed or hopeless Not at all   PHQ-2 Score 0       Patient-reported           1/23/2025   Substance Use   Alcohol more than 3/day or more than 7/wk No   Do you use any other substances recreationally? No     Social History     Tobacco Use    Smoking status: Never      Passive exposure: Never    Smokeless tobacco: Never   Vaping Use    Vaping status: Never Used   Substance Use Topics    Alcohol use: Not Currently    Drug use: Never                  1/23/2025   STI Screening   New sexual partner(s) since last STI/HIV test? No     History of abnormal Pap smear: YES - reflected in Problem List and Health Maintenance accordingly        Latest Ref Rng & Units 10/5/2023     9:53 AM 8/1/2022     3:13 PM 7/23/2018    12:00 AM   PAP / HPV   PAP  Low-grade squamous intraepithelial lesion (LSIL) encompassing HPV/mild dysplasia/CIN1  Atypical squamous cells of undetermined significance (ASC-US)     HPV 16 DNA Negative Negative  Negative     HPV 18 DNA Negative Negative  Negative     HPV_EXT - HISTORICAL    See Scanned Report    Other HR HPV Negative Positive  Positive       ASCVD Risk   The 10-year ASCVD risk score (Laura CAMPOS, et al., 2019) is: 1.1%    Values used to calculate the score:      Age: 44 years      Sex: Female      Is Non- : No      Diabetic: No      Tobacco smoker: No      Systolic Blood Pressure: 129 mmHg      Is BP treated: No      HDL Cholesterol: 39 mg/dL      Total Cholesterol: 178 mg/dL        1/23/2025   Contraception/Family Planning   Questions about contraception or family planning No        Reviewed and updated as needed this visit by Provider   Tobacco  Allergies  Meds  Problems  Med Hx  Surg Hx  Fam Hx                     Objective    Exam  /79 (BP Location: Left arm, Patient Position: Sitting, Cuff Size: Adult Regular)   Pulse 80   Temp 97.5  F (36.4  C) (Temporal)   Resp 16   Ht 1.524 m (5')   Wt 59.7 kg (131 lb 11.2 oz)   LMP 01/09/2025 (Approximate)   SpO2 99%   Breastfeeding No   BMI 25.72 kg/m     Estimated body mass index is 25.72 kg/m  as calculated from the following:    Height as of this encounter: 1.524 m (5').    Weight as of this encounter: 59.7 kg (131 lb 11.2 oz).    Physical Exam  GENERAL: alert  and no distress  EYES: Eyes grossly normal to inspection, PERRL and conjunctivae and sclerae normal  HENT: ear canals and TM's normal, nose and mouth without ulcers or lesions  NECK: no adenopathy, no asymmetry, masses, or scars  RESP: lungs clear to auscultation - no rales, rhonchi or wheezes and expiratory wheezes throughout  CV: regular rate and rhythm, normal S1 S2, no S3 or S4, no murmur, click or rub, no peripheral edema  ABDOMEN: soft, nontender, no hepatosplenomegaly, no masses and bowel sounds normal  MS: no gross musculoskeletal defects noted, no edema  SKIN: no suspicious lesions or rashes  NEURO: Normal strength and tone, mentation intact and speech normal  PSYCH: mentation appears normal, affect normal/bright        Signed Electronically by: Rebecca Mckenna MD

## 2025-01-27 LAB
HPV HR 12 DNA CVX QL NAA+PROBE: POSITIVE
HPV16 DNA CVX QL NAA+PROBE: NEGATIVE
HPV18 DNA CVX QL NAA+PROBE: NEGATIVE
HUMAN PAPILLOMA VIRUS FINAL DIAGNOSIS: ABNORMAL

## 2025-01-29 LAB
BKR AP ASSOCIATED HPV REPORT: ABNORMAL
BKR LAB AP GYN ADEQUACY: ABNORMAL
BKR LAB AP GYN INTERPRETATION: ABNORMAL
BKR LAB AP LMP: ABNORMAL
BKR LAB AP PREVIOUS ABNL DX: ABNORMAL
BKR LAB AP PREVIOUS ABNORMAL: ABNORMAL
PATH REPORT.COMMENTS IMP SPEC: ABNORMAL
PATH REPORT.COMMENTS IMP SPEC: ABNORMAL
PATH REPORT.RELEVANT HX SPEC: ABNORMAL

## 2025-01-30 ENCOUNTER — PATIENT OUTREACH (OUTPATIENT)
Dept: FAMILY MEDICINE | Facility: CLINIC | Age: 45
End: 2025-01-30
Payer: COMMERCIAL

## 2025-02-16 DIAGNOSIS — B34.9 VIRAL SYNDROME: ICD-10-CM

## 2025-02-17 RX ORDER — ALBUTEROL SULFATE 90 UG/1
2 INHALANT RESPIRATORY (INHALATION) EVERY 6 HOURS
Qty: 18 G | Refills: 2 | Status: SHIPPED | OUTPATIENT
Start: 2025-02-17

## 2025-03-25 ENCOUNTER — OFFICE VISIT (OUTPATIENT)
Dept: URGENT CARE | Facility: URGENT CARE | Age: 45
End: 2025-03-25
Payer: COMMERCIAL

## 2025-03-25 VITALS
RESPIRATION RATE: 16 BRPM | SYSTOLIC BLOOD PRESSURE: 123 MMHG | OXYGEN SATURATION: 98 % | TEMPERATURE: 98.3 F | HEART RATE: 90 BPM | DIASTOLIC BLOOD PRESSURE: 83 MMHG

## 2025-03-25 DIAGNOSIS — R09.81 CONGESTION OF PARANASAL SINUS: ICD-10-CM

## 2025-03-25 DIAGNOSIS — N92.6 ABNORMAL MENSTRUAL CYCLE: Primary | ICD-10-CM

## 2025-03-25 LAB
BASOPHILS # BLD AUTO: 0 10E3/UL (ref 0–0.2)
BASOPHILS NFR BLD AUTO: 0 %
EOSINOPHIL # BLD AUTO: 0.1 10E3/UL (ref 0–0.7)
EOSINOPHIL NFR BLD AUTO: 2 %
ERYTHROCYTE [DISTWIDTH] IN BLOOD BY AUTOMATED COUNT: 12.7 % (ref 10–15)
FLUAV AG SPEC QL IA: NEGATIVE
FLUBV AG SPEC QL IA: NEGATIVE
HCT VFR BLD AUTO: 34 % (ref 35–47)
HGB BLD-MCNC: 10.9 G/DL (ref 11.7–15.7)
IMM GRANULOCYTES # BLD: 0 10E3/UL
IMM GRANULOCYTES NFR BLD: 0 %
LYMPHOCYTES # BLD AUTO: 1.1 10E3/UL (ref 0.8–5.3)
LYMPHOCYTES NFR BLD AUTO: 24 %
MCH RBC QN AUTO: 25.2 PG (ref 26.5–33)
MCHC RBC AUTO-ENTMCNC: 32.1 G/DL (ref 31.5–36.5)
MCV RBC AUTO: 79 FL (ref 78–100)
MONOCYTES # BLD AUTO: 0.5 10E3/UL (ref 0–1.3)
MONOCYTES NFR BLD AUTO: 12 %
NEUTROPHILS # BLD AUTO: 2.8 10E3/UL (ref 1.6–8.3)
NEUTROPHILS NFR BLD AUTO: 62 %
PLATELET # BLD AUTO: 178 10E3/UL (ref 150–450)
RBC # BLD AUTO: 4.32 10E6/UL (ref 3.8–5.2)
WBC # BLD AUTO: 4.5 10E3/UL (ref 4–11)

## 2025-03-25 PROCEDURE — 99214 OFFICE O/P EST MOD 30 MIN: CPT | Performed by: PHYSICIAN ASSISTANT

## 2025-03-25 PROCEDURE — 87804 INFLUENZA ASSAY W/OPTIC: CPT | Performed by: PHYSICIAN ASSISTANT

## 2025-03-25 PROCEDURE — 36415 COLL VENOUS BLD VENIPUNCTURE: CPT | Performed by: PHYSICIAN ASSISTANT

## 2025-03-25 PROCEDURE — 3079F DIAST BP 80-89 MM HG: CPT | Performed by: PHYSICIAN ASSISTANT

## 2025-03-25 PROCEDURE — 87635 SARS-COV-2 COVID-19 AMP PRB: CPT | Performed by: PHYSICIAN ASSISTANT

## 2025-03-25 PROCEDURE — 3074F SYST BP LT 130 MM HG: CPT | Performed by: PHYSICIAN ASSISTANT

## 2025-03-25 PROCEDURE — 85025 COMPLETE CBC W/AUTO DIFF WBC: CPT | Performed by: PHYSICIAN ASSISTANT

## 2025-03-25 RX ORDER — PREDNISONE 20 MG/1
40 TABLET ORAL DAILY
Qty: 10 TABLET | Refills: 0 | Status: SHIPPED | OUTPATIENT
Start: 2025-03-25 | End: 2025-03-30

## 2025-03-25 NOTE — PROGRESS NOTES
Assessment & Plan:      Problem List Items Addressed This Visit    None  Visit Diagnoses       Abnormal menstrual cycle    -  Primary    Relevant Orders    CBC with platelets and differential (Completed)    Ob/Gyn  Referral    Congestion of paranasal sinus        Relevant Medications    amoxicillin-clavulanate (AUGMENTIN) 875-125 MG tablet    predniSONE (DELTASONE) 20 MG tablet    Other Relevant Orders    Influenza A & B Antigen - Clinic Collect (Completed)    COVID-19 Virus (Coronavirus) by PCR Nose          Medical Decision Making  Patient presents with worsening sinus congestion over the last 2 weeks as well as an abnormal menstrual cycle.  Sinus congestion today is concerning for bacterial sinusitis.  Recommend oral antibiotics and short course of oral steroids.  Unsure of cause for abnormal menstrual cycle.  Recommend patient continue to monitor.  Placed nonurgent referral to OB/GYN for further evaluation and treatment as needed if abnormal menstrual cycle continues.     Subjective:      Earnestine Guillen is a 44 year old female here for evaluation of abnormal menstrual cycle and sinus congestion.  Patient notes sinus congestion that started 2 weeks ago with acute worsening over the last 2 to 3 days.  Patient is also noting cough and rhinorrhea.  Patient then developed symptoms consistent with her menstrual cycle 2 to 3 days ago despite already having her menstrual cycle on 3/9.  Patient is getting the typical lower abdominal cramping as well.     The following portions of the patient's history were reviewed and updated as appropriate: allergies, current medications, and problem list.     Review of Systems  Pertinent items are noted in HPI.    Allergies  Allergies   Allergen Reactions    Methocarbamol Rash     Could be an allergy with this or propranolol    Venlafaxine Rash       No family history on file.    Social History     Tobacco Use    Smoking status: Never     Passive exposure: Never     Smokeless tobacco: Never   Substance Use Topics    Alcohol use: Not Currently        Objective:      /83   Pulse 90   Temp 98.3  F (36.8  C) (Oral)   Resp 16   SpO2 98%   General appearance - alert, well appearing, and in no distress and non-toxic  Ears - bilateral TM's and external ear canals normal  Nose - normal and patent, no erythema, discharge or polyps  Mouth - mucous membranes moist, pharynx normal without lesions  Neck - supple, no significant adenopathy  Chest - clear to auscultation, no wheezes, rales or rhonchi, symmetric air entry  Heart - normal rate, regular rhythm, normal S1, S2, no murmurs, rubs, clicks or gallops     Lab & Imaging Results    Results for orders placed or performed in visit on 03/25/25   CBC with platelets and differential     Status: Abnormal   Result Value Ref Range    WBC Count 4.5 4.0 - 11.0 10e3/uL    RBC Count 4.32 3.80 - 5.20 10e6/uL    Hemoglobin 10.9 (L) 11.7 - 15.7 g/dL    Hematocrit 34.0 (L) 35.0 - 47.0 %    MCV 79 78 - 100 fL    MCH 25.2 (L) 26.5 - 33.0 pg    MCHC 32.1 31.5 - 36.5 g/dL    RDW 12.7 10.0 - 15.0 %    Platelet Count 178 150 - 450 10e3/uL    % Neutrophils 62 %    % Lymphocytes 24 %    % Monocytes 12 %    % Eosinophils 2 %    % Basophils 0 %    % Immature Granulocytes 0 %    Absolute Neutrophils 2.8 1.6 - 8.3 10e3/uL    Absolute Lymphocytes 1.1 0.8 - 5.3 10e3/uL    Absolute Monocytes 0.5 0.0 - 1.3 10e3/uL    Absolute Eosinophils 0.1 0.0 - 0.7 10e3/uL    Absolute Basophils 0.0 0.0 - 0.2 10e3/uL    Absolute Immature Granulocytes 0.0 <=0.4 10e3/uL   Influenza A & B Antigen - Clinic Collect     Status: Normal    Specimen: Nose; Swab   Result Value Ref Range    Influenza A antigen Negative Negative    Influenza B antigen Negative Negative    Narrative    Test results must be correlated with clinical data. If necessary, results should be confirmed by a molecular assay or viral culture.   CBC with platelets and differential     Status: Abnormal    Narrative     The following orders were created for panel order CBC with platelets and differential.  Procedure                               Abnormality         Status                     ---------                               -----------         ------                     CBC with platelets and ...[[id number]]  Abnormal            Final result                 Please view results for these tests on the individual orders.       I personally reviewed these results and discussed findings with the patient.    The use of Dragon[employer]Star[employer]meation services was used to construct the content of this note; any grammatical errors are non-intentional. Please contact the author directly if you are in need of any clarification.

## 2025-03-26 LAB — SARS-COV-2 RNA RESP QL NAA+PROBE: NEGATIVE

## 2025-04-29 DIAGNOSIS — J45.20 MILD INTERMITTENT ASTHMA WITHOUT COMPLICATION: ICD-10-CM

## 2025-04-30 RX ORDER — BUDESONIDE AND FORMOTEROL FUMARATE DIHYDRATE 80; 4.5 UG/1; UG/1
2 AEROSOL RESPIRATORY (INHALATION) 2 TIMES DAILY
Qty: 10.2 G | Refills: 2 | Status: SHIPPED | OUTPATIENT
Start: 2025-04-30

## 2025-06-05 DIAGNOSIS — G43.109 MIGRAINE WITH AURA AND WITHOUT STATUS MIGRAINOSUS, NOT INTRACTABLE: ICD-10-CM

## 2025-06-05 RX ORDER — SUMATRIPTAN SUCCINATE 100 MG/1
TABLET ORAL
Qty: 9 TABLET | Refills: 5 | Status: SHIPPED | OUTPATIENT
Start: 2025-06-05

## 2025-06-21 DIAGNOSIS — Z30.09 BIRTH CONTROL COUNSELING: ICD-10-CM

## 2025-06-23 RX ORDER — NORELGESTROMIN AND ETHINYL ESTRADIOL 35; 150 UG/MG; UG/MG
PATCH TRANSDERMAL
Qty: 9 PATCH | Refills: 1 | Status: SHIPPED | OUTPATIENT
Start: 2025-06-23

## 2025-07-19 ENCOUNTER — HEALTH MAINTENANCE LETTER (OUTPATIENT)
Age: 45
End: 2025-07-19